# Patient Record
Sex: FEMALE
[De-identification: names, ages, dates, MRNs, and addresses within clinical notes are randomized per-mention and may not be internally consistent; named-entity substitution may affect disease eponyms.]

---

## 2018-04-23 ENCOUNTER — APPOINTMENT (OUTPATIENT)
Dept: ORTHOPEDIC SURGERY | Facility: CLINIC | Age: 63
End: 2018-04-23
Payer: COMMERCIAL

## 2018-04-23 VITALS
WEIGHT: 183 LBS | SYSTOLIC BLOOD PRESSURE: 112 MMHG | DIASTOLIC BLOOD PRESSURE: 73 MMHG | HEART RATE: 64 BPM | HEIGHT: 67 IN | BODY MASS INDEX: 28.72 KG/M2

## 2018-04-23 PROCEDURE — 73080 X-RAY EXAM OF ELBOW: CPT | Mod: LT

## 2018-04-23 PROCEDURE — 99204 OFFICE O/P NEW MOD 45 MIN: CPT

## 2018-04-23 PROCEDURE — 73030 X-RAY EXAM OF SHOULDER: CPT | Mod: LT

## 2018-05-14 ENCOUNTER — APPOINTMENT (OUTPATIENT)
Dept: ORTHOPEDIC SURGERY | Facility: CLINIC | Age: 63
End: 2018-05-14
Payer: COMMERCIAL

## 2018-05-14 VITALS
DIASTOLIC BLOOD PRESSURE: 81 MMHG | HEART RATE: 61 BPM | BODY MASS INDEX: 28.72 KG/M2 | SYSTOLIC BLOOD PRESSURE: 146 MMHG | WEIGHT: 183 LBS | HEIGHT: 67 IN

## 2018-05-14 PROCEDURE — 99213 OFFICE O/P EST LOW 20 MIN: CPT

## 2018-06-11 ENCOUNTER — APPOINTMENT (OUTPATIENT)
Dept: ORTHOPEDIC SURGERY | Facility: CLINIC | Age: 63
End: 2018-06-11
Payer: COMMERCIAL

## 2018-06-11 VITALS
HEIGHT: 67 IN | DIASTOLIC BLOOD PRESSURE: 76 MMHG | HEART RATE: 61 BPM | BODY MASS INDEX: 28.72 KG/M2 | WEIGHT: 183 LBS | SYSTOLIC BLOOD PRESSURE: 120 MMHG

## 2018-06-11 DIAGNOSIS — M77.12 LATERAL EPICONDYLITIS, LEFT ELBOW: ICD-10-CM

## 2018-06-11 DIAGNOSIS — M75.82 OTHER SHOULDER LESIONS, LEFT SHOULDER: ICD-10-CM

## 2018-06-11 PROCEDURE — 99213 OFFICE O/P EST LOW 20 MIN: CPT

## 2018-06-16 ENCOUNTER — RX RENEWAL (OUTPATIENT)
Age: 63
End: 2018-06-16

## 2018-07-09 ENCOUNTER — APPOINTMENT (OUTPATIENT)
Dept: ORTHOPEDIC SURGERY | Facility: CLINIC | Age: 63
End: 2018-07-09

## 2018-08-16 ENCOUNTER — RESULT REVIEW (OUTPATIENT)
Age: 63
End: 2018-08-16

## 2018-08-16 ENCOUNTER — OUTPATIENT (OUTPATIENT)
Dept: OUTPATIENT SERVICES | Facility: HOSPITAL | Age: 63
LOS: 1 days | Discharge: HOME | End: 2018-08-16

## 2018-08-16 DIAGNOSIS — Z90.5 ACQUIRED ABSENCE OF KIDNEY: Chronic | ICD-10-CM

## 2018-08-21 LAB — SURGICAL PATHOLOGY STUDY: SIGNIFICANT CHANGE UP

## 2018-09-24 ENCOUNTER — RESULT REVIEW (OUTPATIENT)
Age: 63
End: 2018-09-24

## 2019-03-04 ENCOUNTER — INPATIENT (INPATIENT)
Facility: HOSPITAL | Age: 64
LOS: 1 days | Discharge: HOME | DRG: 552 | End: 2019-03-06
Attending: HOSPITALIST | Admitting: HOSPITALIST
Payer: MEDICARE

## 2019-03-04 VITALS
HEIGHT: 67 IN | HEART RATE: 66 BPM | TEMPERATURE: 98 F | SYSTOLIC BLOOD PRESSURE: 139 MMHG | WEIGHT: 186.95 LBS | DIASTOLIC BLOOD PRESSURE: 87 MMHG | RESPIRATION RATE: 20 BRPM | OXYGEN SATURATION: 96 %

## 2019-03-04 DIAGNOSIS — Z90.5 ACQUIRED ABSENCE OF KIDNEY: Chronic | ICD-10-CM

## 2019-03-04 LAB
ALBUMIN SERPL ELPH-MCNC: 3.4 G/DL — LOW (ref 3.5–5)
ALP SERPL-CCNC: 59 U/L — SIGNIFICANT CHANGE UP (ref 40–120)
ALT FLD-CCNC: 20 U/L DA — SIGNIFICANT CHANGE UP (ref 10–60)
ANION GAP SERPL CALC-SCNC: 7 MMOL/L — SIGNIFICANT CHANGE UP (ref 5–17)
APPEARANCE UR: CLEAR — SIGNIFICANT CHANGE UP
APTT BLD: 28.5 SEC — SIGNIFICANT CHANGE UP (ref 27.5–36.3)
AST SERPL-CCNC: 20 U/L — SIGNIFICANT CHANGE UP (ref 10–40)
BACTERIA # UR AUTO: ABNORMAL /HPF
BASOPHILS # BLD AUTO: 0.04 K/UL — SIGNIFICANT CHANGE UP (ref 0–0.2)
BASOPHILS NFR BLD AUTO: 0.5 % — SIGNIFICANT CHANGE UP (ref 0–2)
BILIRUB SERPL-MCNC: 0.4 MG/DL — SIGNIFICANT CHANGE UP (ref 0.2–1.2)
BILIRUB UR-MCNC: NEGATIVE — SIGNIFICANT CHANGE UP
BUN SERPL-MCNC: 31 MG/DL — HIGH (ref 7–18)
CALCIUM SERPL-MCNC: 8 MG/DL — LOW (ref 8.4–10.5)
CHLORIDE SERPL-SCNC: 111 MMOL/L — HIGH (ref 96–108)
CO2 SERPL-SCNC: 24 MMOL/L — SIGNIFICANT CHANGE UP (ref 22–31)
COLOR SPEC: YELLOW — SIGNIFICANT CHANGE UP
CREAT SERPL-MCNC: 1.1 MG/DL — SIGNIFICANT CHANGE UP (ref 0.5–1.3)
DIFF PNL FLD: NEGATIVE — SIGNIFICANT CHANGE UP
EOSINOPHIL # BLD AUTO: 0.07 K/UL — SIGNIFICANT CHANGE UP (ref 0–0.5)
EOSINOPHIL NFR BLD AUTO: 0.8 % — SIGNIFICANT CHANGE UP (ref 0–6)
EPI CELLS # UR: SIGNIFICANT CHANGE UP /HPF
GLUCOSE SERPL-MCNC: 103 MG/DL — HIGH (ref 70–99)
GLUCOSE UR QL: NEGATIVE — SIGNIFICANT CHANGE UP
HCT VFR BLD CALC: 36.3 % — SIGNIFICANT CHANGE UP (ref 34.5–45)
HGB BLD-MCNC: 11.4 G/DL — LOW (ref 11.5–15.5)
IMM GRANULOCYTES NFR BLD AUTO: 0.1 % — SIGNIFICANT CHANGE UP (ref 0–1.5)
INR BLD: 0.99 RATIO — SIGNIFICANT CHANGE UP (ref 0.88–1.16)
KETONES UR-MCNC: NEGATIVE — SIGNIFICANT CHANGE UP
LEUKOCYTE ESTERASE UR-ACNC: ABNORMAL
LYMPHOCYTES # BLD AUTO: 2.96 K/UL — SIGNIFICANT CHANGE UP (ref 1–3.3)
LYMPHOCYTES # BLD AUTO: 34.7 % — SIGNIFICANT CHANGE UP (ref 13–44)
MAGNESIUM SERPL-MCNC: 2.2 MG/DL — SIGNIFICANT CHANGE UP (ref 1.6–2.6)
MCHC RBC-ENTMCNC: 27.5 PG — SIGNIFICANT CHANGE UP (ref 27–34)
MCHC RBC-ENTMCNC: 31.4 GM/DL — LOW (ref 32–36)
MCV RBC AUTO: 87.5 FL — SIGNIFICANT CHANGE UP (ref 80–100)
MONOCYTES # BLD AUTO: 0.4 K/UL — SIGNIFICANT CHANGE UP (ref 0–0.9)
MONOCYTES NFR BLD AUTO: 4.7 % — SIGNIFICANT CHANGE UP (ref 2–14)
NEUTROPHILS # BLD AUTO: 5.06 K/UL — SIGNIFICANT CHANGE UP (ref 1.8–7.4)
NEUTROPHILS NFR BLD AUTO: 59.2 % — SIGNIFICANT CHANGE UP (ref 43–77)
NITRITE UR-MCNC: NEGATIVE — SIGNIFICANT CHANGE UP
NRBC # BLD: 0 /100 WBCS — SIGNIFICANT CHANGE UP (ref 0–0)
PH UR: 5 — SIGNIFICANT CHANGE UP (ref 5–8)
PHOSPHATE SERPL-MCNC: 3.6 MG/DL — SIGNIFICANT CHANGE UP (ref 2.5–4.5)
PLATELET # BLD AUTO: 147 K/UL — LOW (ref 150–400)
POTASSIUM SERPL-MCNC: 4.2 MMOL/L — SIGNIFICANT CHANGE UP (ref 3.5–5.3)
POTASSIUM SERPL-SCNC: 4.2 MMOL/L — SIGNIFICANT CHANGE UP (ref 3.5–5.3)
PROT SERPL-MCNC: 7.4 G/DL — SIGNIFICANT CHANGE UP (ref 6–8.3)
PROT UR-MCNC: NEGATIVE — SIGNIFICANT CHANGE UP
PROTHROM AB SERPL-ACNC: 11 SEC — SIGNIFICANT CHANGE UP (ref 10–12.9)
RBC # BLD: 4.15 M/UL — SIGNIFICANT CHANGE UP (ref 3.8–5.2)
RBC # FLD: 14 % — SIGNIFICANT CHANGE UP (ref 10.3–14.5)
RBC CASTS # UR COMP ASSIST: SIGNIFICANT CHANGE UP /HPF (ref 0–2)
SODIUM SERPL-SCNC: 142 MMOL/L — SIGNIFICANT CHANGE UP (ref 135–145)
SP GR SPEC: 1.02 — SIGNIFICANT CHANGE UP (ref 1.01–1.02)
UROBILINOGEN FLD QL: NEGATIVE — SIGNIFICANT CHANGE UP
WBC # BLD: 8.54 K/UL — SIGNIFICANT CHANGE UP (ref 3.8–10.5)
WBC # FLD AUTO: 8.54 K/UL — SIGNIFICANT CHANGE UP (ref 3.8–10.5)
WBC UR QL: SIGNIFICANT CHANGE UP /HPF (ref 0–5)

## 2019-03-04 PROCEDURE — 71045 X-RAY EXAM CHEST 1 VIEW: CPT | Mod: 26

## 2019-03-04 PROCEDURE — 99285 EMERGENCY DEPT VISIT HI MDM: CPT

## 2019-03-04 PROCEDURE — 99223 1ST HOSP IP/OBS HIGH 75: CPT

## 2019-03-04 PROCEDURE — 70450 CT HEAD/BRAIN W/O DYE: CPT | Mod: 26

## 2019-03-04 RX ORDER — SODIUM CHLORIDE 9 MG/ML
1000 INJECTION INTRAMUSCULAR; INTRAVENOUS; SUBCUTANEOUS ONCE
Qty: 0 | Refills: 0 | Status: COMPLETED | OUTPATIENT
Start: 2019-03-04 | End: 2019-03-04

## 2019-03-04 RX ADMIN — SODIUM CHLORIDE 1000 MILLILITER(S): 9 INJECTION INTRAMUSCULAR; INTRAVENOUS; SUBCUTANEOUS at 21:05

## 2019-03-04 NOTE — H&P ADULT - ASSESSMENT
63 Female from home with PMH of Non-obstructive CAD, PVCs, S/p Nephrectomy came to hospital for unsteady gait for 1-2 months. Admitted to Tele for suspicion of lumber radiculopathy.

## 2019-03-04 NOTE — H&P ADULT - HISTORY OF PRESENT ILLNESS
63 Female 63 Female with PMH of Non-obstructive CAD, PVCs, S/p Nephrectomy came to hospital for unsteady gait. 63 Female from home with PMH of Non-obstructive CAD, PVCs, S/p Nephrectomy came to hospital for unsteady gait for 1-2 months. She has been feeling decreased strength in lower extremity with some pain in her Lt leg. Pain is dull and exacerbates with movement of leg. Pt states, she can't stand still for more than a minute due to her unsteadiness. Pain also radiates down to her left thigh. She has been moving some furniture at home but denies trauma or fall. Pt has noted some frontal headache and denies dizziness, focal weakness, chest pain, shortness of breath, problems with urine/bowel incontinence.     ED Course: Hemodynamically stable. Labs were unremarkable. CT head was normal and EKG showed Short [place] interval. CXR was clear and she was given 1 liter bolus. 63 Female from home with PMH of Non-obstructive CAD, PVCs, S/p Nephrectomy (40 yrs ago) came to hospital for unsteady gait for 1-2 months. She has been feeling decreased strength in lower extremity with some pain in her Lt leg. Pain is dull and exacerbates with movement of leg. Pt states, she can't stand still for more than a minute due to her unsteadiness. Pain also radiates down to her left thigh. She has been moving some furniture at home but denies trauma or fall. Pt has noted some frontal headache and denies dizziness, focal weakness, chest pain, shortness of breath, problems with urine/bowel incontinence.     ED Course: Hemodynamically stable. Labs were unremarkable. CT head was normal and EKG showed Short [place] interval. CXR was clear and she was given 1 liter bolus.

## 2019-03-04 NOTE — ED PROVIDER NOTE - CLINICAL SUMMARY MEDICAL DECISION MAKING FREE TEXT BOX
62 y/o F pt with possible central mass vs CNS process. Low suspicion for intracranial. Very likely spinal. Will get CT head, neuro consult and admit for MRI for further workup.

## 2019-03-04 NOTE — H&P ADULT - ATTENDING COMMENTS
Patient seen and examined ; case was discussed with the admitting resident  ROS: as in the HPI; all other ROS negative  SH and family history as above    Vital Signs Last 24 Hrs  T(C): 36.3 (05 Mar 2019 04:57), Max: 36.7 (04 Mar 2019 20:50)  T(F): 97.3 (05 Mar 2019 04:57), Max: 98 (04 Mar 2019 20:50)  HR: 56 (05 Mar 2019 04:57) (53 - 66)  BP: 126/63 (05 Mar 2019 04:57) (126/63 - 142/65)  BP(mean): --  RR: 16 (05 Mar 2019 04:57) (16 - 20)  SpO2: 99% (05 Mar 2019 04:57) (95% - 99%)  GEN: NAD  HEENT- normocephalic; mouth moist  CVS- S1S2+, bradycardia ~60bpm   LUNGS- clear to auscultation; no wheezing  ABD: Soft , nontender, nondistended, Bowel sounds are present  EXTREMITY: no calf tenderness, no cyanosis, no edema. +Gopi test for L hip pain   NEURO: AAOx3; non focal neurologic exam; cranial nerves grossly intact, no nystagmus, DTRs 2 in UE BL brachioradialis, triceps, 2 in L patellar and not able to get reflex on R patellar   PSYCH: normal affect and behavior  BACK: no swelling or mass; +pinpoint tenderness over lumbosacral junction.   VASCULAR: ++ distal peripheral pulses  SKIN: warm and dry.       Labs Reviewed:                         11.4   8.54  )-----------( 147      ( 04 Mar 2019 21:00 )             36.3     03-04    142  |  111<H>  |  31<H>  ----------------------------<  103<H>  4.2   |  24  |  1.10    Ca    8.0<L>      04 Mar 2019 21:00  Phos  3.6     03-04  Mg     2.2     03-04    TPro  7.4  /  Alb  3.4<L>  /  TBili  0.4  /  DBili  x   /  AST  20  /  ALT  20  /  AlkPhos  59  03-04    CT Head reviewed-   < from: CT Head No Cont (03.04.19 @ 20:23)   VENTRICLES AND SULCI:  Prominence of the ventricles and sulci, probably   on the basis of diffuse cerebral volume loss.  INTRA-AXIAL:  No acute intracranial hemorrhage, mass effect, or evidence   of acute territorial infarct. Small-vessel white matter ischemic changes   are present.  EXTRA-AXIAL:  No mass or collection is seen.  VISUALIZED SINUSES:  No air-fluid levels.  VISUALIZED MASTOIDS:  Clear.  CALVARIUM:  Normal.  MISCELLANEOUS:  None.    IMPRESSION:    No acute intracranial hemorrhage, mass effect, or evidence of acute   territorial infarct.   If clinical symptoms persist, recommend follow-up imaging with MRI brain   if there are no contraindications.    EKG Reviewed     64 y/o F with h/o arrhythmia- ?PVCs vs SVT (unclear but she appears to have had some sort of ablation procedure which she reports was not successful) admitted with gait dysfunction and BL LE weakness.     1.	LE weakness- in ability to ambulate. Give way weakness on exam, denies pain but has pain when doing ROM and MST testing and has MSK componenent with e/o trochanteric bursitis and hip OA on exam, but also has point tenderness over LS junction and clinical features of DJD. Low clinical suspicion for AIDP as weakness is give-way and MSK contributions appear to be contributing more, reflexes present although not able to get L4 on the RLE. Denies sx of cauda equina/cord compression, fever, nightsweats. Will check vit D/B12/Mg/Phos/TSH/ESR/CRP.  Would check MRI L spine, to be discussed further with neurology in AM. Patient reports having to hold on to wall to keep from falling but denies vertiginous symptoms, does have sinus bradycardia. PT consult.   2.	Trochanteric bursitis and likely L hip OA- check xray   3.	Headache- appears tension. CT negative for acute findings, no focal deficits. Tylenol.   4.	Pain control- patient denies being in significant pain but appears uncomfortable on exam and when moving around. Added APAP, lidocaine, and Flexeril. Patient hesitant to take medication.  5.	Abnormal EKG- short SD with LVH and repolarization changes and bradycardia. Reports h/o baseline tachycardia, had LHC in 2016 which was negative for CAD but was performed for abnormal stress test at that time which had SVT and ischemic changes on stress echo. Monitor on tele for now and consider DC if echo ok.   6.	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 Monitor, avoid nephrotoxic medications.       Plan of care discussed with patient ;  all questions and concerns were addressed.

## 2019-03-04 NOTE — ED PROVIDER NOTE - PHYSICAL EXAMINATION
MSK: 4/5 L leg with subjective numbness to outer aspect L leg  positive Romberg   ataxia upon walking with hesitancy to the L side

## 2019-03-04 NOTE — H&P ADULT - NSHPPHYSICALEXAM_GEN_ALL_CORE
Vital Signs Last 24 Hrs  T(C): 36.7 (05 Mar 2019 00:24), Max: 36.7 (04 Mar 2019 20:50)  T(F): 98 (05 Mar 2019 00:24), Max: 98 (04 Mar 2019 20:50)  HR: 53 (05 Mar 2019 00:24) (53 - 66)  BP: 142/65 (05 Mar 2019 00:24) (139/76 - 142/65)  RR: 18 (05 Mar 2019 00:24) (18 - 20)  SpO2: 95% (05 Mar 2019 00:24) (95% - 96%)  .  GENERAL: Well developed, Middle aged female, NAD  HEENT:  Normocephalic/Atraumatic, reactive light reflex, moist mucous membranes  NECK: Supple, no JVD  RESP: Symmetric movement of the chest, clear to auscultation bilaterally  CVS: S1 and S2 audible, no murmur, rubs or gallops noted  GI: Normal active bowel sounds present, abdomen soft, non tender, non distended  EXTREMITIES:  No edema, no clubbing, cyanosis  MSK: 4/5 Strength in bilateral lower extremity more weakness in Lt leg, Lt hip point tenderness, Hypoactive Lt leg DTRs   PSYCH: Normal mood, normal affect observed    NEURO: Alert and oriented x 3

## 2019-03-04 NOTE — ED PROVIDER NOTE - OBJECTIVE STATEMENT
64 y/o F pt with a PMHx of HTN and a PSHx of nephrectomy presents to the ED c/o b/l leg weakness and unsteady gait x couple months worsening yesterday with associated headaches and back pain over the past week. Pt reports feeling like she has to hold on to something when she walks or stands up and cannot  one place for more than a minute. Pt notes that her cowokers told her she was shaking at work today. Pt denies current back pain, dizziness, numbness, abd pain, diarrhea, runny nose, cough or any other complaints; denies any falls. Pt is not on any medications. PMD; Dr. Rafaela Evans. [email or web address].

## 2019-03-04 NOTE — H&P ADULT - PROBLEM SELECTOR PLAN 1
4/5 Strength in bilateral lower extremity with slightly more weakness in Lt leg, Lt hip point tenderness, Hypoactive Lt leg DTRs  Negative straight leg test  No reported loss of urine or bowel  Ordered Lumbar MRI to r/o radiculopathy  Flexeril and Lidocaine patch for pain control 4/5 Strength in bilateral lower extremity with slightly more weakness in Lt leg, Lt hip point tenderness, Hypoactive Lt leg DTRs  Negative straight leg test  No reported loss of urine or bowel  Ordered Lumbar MRI to r/o radiculopathy  Flexeril and Lidocaine patch for pain control  Consulted Dr Aguilar

## 2019-03-05 DIAGNOSIS — R00.1 BRADYCARDIA, UNSPECIFIED: ICD-10-CM

## 2019-03-05 DIAGNOSIS — R27.0 ATAXIA, UNSPECIFIED: ICD-10-CM

## 2019-03-05 DIAGNOSIS — Z29.9 ENCOUNTER FOR PROPHYLACTIC MEASURES, UNSPECIFIED: ICD-10-CM

## 2019-03-05 DIAGNOSIS — R26.9 UNSPECIFIED ABNORMALITIES OF GAIT AND MOBILITY: ICD-10-CM

## 2019-03-05 LAB
ALBUMIN SERPL ELPH-MCNC: 2.9 G/DL — LOW (ref 3.5–5)
ALP SERPL-CCNC: 50 U/L — SIGNIFICANT CHANGE UP (ref 40–120)
ALT FLD-CCNC: 18 U/L DA — SIGNIFICANT CHANGE UP (ref 10–60)
ANION GAP SERPL CALC-SCNC: 6 MMOL/L — SIGNIFICANT CHANGE UP (ref 5–17)
AST SERPL-CCNC: 16 U/L — SIGNIFICANT CHANGE UP (ref 10–40)
BASOPHILS # BLD AUTO: 0.03 K/UL — SIGNIFICANT CHANGE UP (ref 0–0.2)
BASOPHILS NFR BLD AUTO: 0.5 % — SIGNIFICANT CHANGE UP (ref 0–2)
BILIRUB SERPL-MCNC: 0.7 MG/DL — SIGNIFICANT CHANGE UP (ref 0.2–1.2)
BUN SERPL-MCNC: 26 MG/DL — HIGH (ref 7–18)
CALCIUM SERPL-MCNC: 7.4 MG/DL — LOW (ref 8.4–10.5)
CHLORIDE SERPL-SCNC: 114 MMOL/L — HIGH (ref 96–108)
CHOLEST SERPL-MCNC: 142 MG/DL — SIGNIFICANT CHANGE UP (ref 10–199)
CK MB BLD-MCNC: <1.5 % — SIGNIFICANT CHANGE UP (ref 0–3.5)
CK MB CFR SERPL CALC: <1 NG/ML — SIGNIFICANT CHANGE UP (ref 0–3.6)
CK SERPL-CCNC: 68 U/L — SIGNIFICANT CHANGE UP (ref 21–215)
CO2 SERPL-SCNC: 24 MMOL/L — SIGNIFICANT CHANGE UP (ref 22–31)
CREAT SERPL-MCNC: 0.99 MG/DL — SIGNIFICANT CHANGE UP (ref 0.5–1.3)
CRP SERPL-MCNC: 0.45 MG/DL — HIGH (ref 0–0.4)
EOSINOPHIL # BLD AUTO: 0.08 K/UL — SIGNIFICANT CHANGE UP (ref 0–0.5)
EOSINOPHIL NFR BLD AUTO: 1.2 % — SIGNIFICANT CHANGE UP (ref 0–6)
ERYTHROCYTE [SEDIMENTATION RATE] IN BLOOD: 12 MM/HR — SIGNIFICANT CHANGE UP (ref 0–20)
FOLATE SERPL-MCNC: 11.3 NG/ML — SIGNIFICANT CHANGE UP
GLUCOSE SERPL-MCNC: 87 MG/DL — SIGNIFICANT CHANGE UP (ref 70–99)
HBA1C BLD-MCNC: 6.3 % — HIGH (ref 4–5.6)
HCT VFR BLD CALC: 35.3 % — SIGNIFICANT CHANGE UP (ref 34.5–45)
HDLC SERPL-MCNC: 58 MG/DL — SIGNIFICANT CHANGE UP
HGB BLD-MCNC: 11.1 G/DL — LOW (ref 11.5–15.5)
IMM GRANULOCYTES NFR BLD AUTO: 0.3 % — SIGNIFICANT CHANGE UP (ref 0–1.5)
LIPID PNL WITH DIRECT LDL SERPL: 75 MG/DL — SIGNIFICANT CHANGE UP
LYMPHOCYTES # BLD AUTO: 2.74 K/UL — SIGNIFICANT CHANGE UP (ref 1–3.3)
LYMPHOCYTES # BLD AUTO: 42.5 % — SIGNIFICANT CHANGE UP (ref 13–44)
MAGNESIUM SERPL-MCNC: 2 MG/DL — SIGNIFICANT CHANGE UP (ref 1.6–2.6)
MCHC RBC-ENTMCNC: 27.4 PG — SIGNIFICANT CHANGE UP (ref 27–34)
MCHC RBC-ENTMCNC: 31.4 GM/DL — LOW (ref 32–36)
MCV RBC AUTO: 87.2 FL — SIGNIFICANT CHANGE UP (ref 80–100)
MONOCYTES # BLD AUTO: 0.31 K/UL — SIGNIFICANT CHANGE UP (ref 0–0.9)
MONOCYTES NFR BLD AUTO: 4.8 % — SIGNIFICANT CHANGE UP (ref 2–14)
NEUTROPHILS # BLD AUTO: 3.27 K/UL — SIGNIFICANT CHANGE UP (ref 1.8–7.4)
NEUTROPHILS NFR BLD AUTO: 50.7 % — SIGNIFICANT CHANGE UP (ref 43–77)
NRBC # BLD: 0 /100 WBCS — SIGNIFICANT CHANGE UP (ref 0–0)
PHOSPHATE SERPL-MCNC: 3.6 MG/DL — SIGNIFICANT CHANGE UP (ref 2.5–4.5)
PLATELET # BLD AUTO: 146 K/UL — LOW (ref 150–400)
POTASSIUM SERPL-MCNC: 4.1 MMOL/L — SIGNIFICANT CHANGE UP (ref 3.5–5.3)
POTASSIUM SERPL-SCNC: 4.1 MMOL/L — SIGNIFICANT CHANGE UP (ref 3.5–5.3)
PROT SERPL-MCNC: 6.5 G/DL — SIGNIFICANT CHANGE UP (ref 6–8.3)
RBC # BLD: 4.05 M/UL — SIGNIFICANT CHANGE UP (ref 3.8–5.2)
RBC # FLD: 14.1 % — SIGNIFICANT CHANGE UP (ref 10.3–14.5)
SODIUM SERPL-SCNC: 144 MMOL/L — SIGNIFICANT CHANGE UP (ref 135–145)
T4 AB SER-ACNC: 5.8 UG/DL — SIGNIFICANT CHANGE UP (ref 4.6–12)
TOTAL CHOLESTEROL/HDL RATIO MEASUREMENT: 2.4 RATIO — LOW (ref 3.3–7.1)
TRIGL SERPL-MCNC: 43 MG/DL — SIGNIFICANT CHANGE UP (ref 10–149)
TROPONIN I SERPL-MCNC: <0.015 NG/ML — SIGNIFICANT CHANGE UP (ref 0–0.04)
TSH SERPL-MCNC: 2.38 UU/ML — SIGNIFICANT CHANGE UP (ref 0.34–4.82)
VIT B12 SERPL-MCNC: 792 PG/ML — SIGNIFICANT CHANGE UP (ref 232–1245)
WBC # BLD: 6.45 K/UL — SIGNIFICANT CHANGE UP (ref 3.8–10.5)
WBC # FLD AUTO: 6.45 K/UL — SIGNIFICANT CHANGE UP (ref 3.8–10.5)

## 2019-03-05 PROCEDURE — 99232 SBSQ HOSP IP/OBS MODERATE 35: CPT | Mod: GC

## 2019-03-05 PROCEDURE — 73502 X-RAY EXAM HIP UNI 2-3 VIEWS: CPT | Mod: 26,LT

## 2019-03-05 PROCEDURE — 99223 1ST HOSP IP/OBS HIGH 75: CPT

## 2019-03-05 RX ORDER — GABAPENTIN 400 MG/1
100 CAPSULE ORAL
Qty: 0 | Refills: 0 | Status: DISCONTINUED | OUTPATIENT
Start: 2019-03-05 | End: 2019-03-06

## 2019-03-05 RX ORDER — LIDOCAINE 4 G/100G
1 CREAM TOPICAL DAILY
Qty: 0 | Refills: 0 | Status: DISCONTINUED | OUTPATIENT
Start: 2019-03-05 | End: 2019-03-06

## 2019-03-05 RX ORDER — ENOXAPARIN SODIUM 100 MG/ML
40 INJECTION SUBCUTANEOUS DAILY
Qty: 0 | Refills: 0 | Status: DISCONTINUED | OUTPATIENT
Start: 2019-03-05 | End: 2019-03-06

## 2019-03-05 RX ORDER — ACETAMINOPHEN 500 MG
650 TABLET ORAL EVERY 6 HOURS
Qty: 0 | Refills: 0 | Status: DISCONTINUED | OUTPATIENT
Start: 2019-03-05 | End: 2019-03-06

## 2019-03-05 RX ORDER — CYCLOBENZAPRINE HYDROCHLORIDE 10 MG/1
5 TABLET, FILM COATED ORAL THREE TIMES A DAY
Qty: 0 | Refills: 0 | Status: DISCONTINUED | OUTPATIENT
Start: 2019-03-05 | End: 2019-03-06

## 2019-03-05 RX ADMIN — ENOXAPARIN SODIUM 40 MILLIGRAM(S): 100 INJECTION SUBCUTANEOUS at 12:29

## 2019-03-05 RX ADMIN — LIDOCAINE 1 PATCH: 4 CREAM TOPICAL at 19:39

## 2019-03-05 RX ADMIN — LIDOCAINE 1 PATCH: 4 CREAM TOPICAL at 12:29

## 2019-03-05 RX ADMIN — SODIUM CHLORIDE 1000 MILLILITER(S): 9 INJECTION INTRAMUSCULAR; INTRAVENOUS; SUBCUTANEOUS at 01:17

## 2019-03-05 RX ADMIN — GABAPENTIN 100 MILLIGRAM(S): 400 CAPSULE ORAL at 17:40

## 2019-03-05 NOTE — CONSULT NOTE ADULT - SUBJECTIVE AND OBJECTIVE BOX
Patient is a 63y old  Female who presents with a chief complaint of Legs weakness and pain (04 Mar 2019 23:54)      HPI:  63 Female from home with PMH of Non-obstructive CAD, PVCs, S/p Nephrectomy (40 yrs ago) came to hospital for unsteady gait for 1-2 months. She has been feeling decreased strength in lower extremity with some pain in her Lt leg. Pain is dull and exacerbates with movement of leg. Pt states, she can't stand still for more than a minute due to her unsteadiness. Pain also radiates down to her left thigh. She has been moving some furniture at home but denies trauma or fall. Pt has noted some frontal headache and denies dizziness, focal weakness, chest pain, shortness of breath, problems with urine/bowel incontinence.     ED Course: Hemodynamically stable. Labs were unremarkable. CT head was normal and EKG showed Short [place] interval. CXR was clear and she was given 1 liter bolus. (04 Mar 2019 23:54)           The patient was last know well at  The patient lives at home/ NH.  The patient walks without assistance/ with a cane or walker    Neurological Review of Systems:  No difficulty with language.  No vision loss or double vision.  No dizziness, vertigo or new hearing loss.  No difficulty with speech or swallowing.  No focal weakness.  No focal sensory changes.  No numbness or tingling in the bilateral lower extremities.  No difficulty with balance.  No difficulty with ambulation.      MEDICATIONS  (STANDING):  enoxaparin Injectable 40 milliGRAM(s) SubCutaneous daily  lidocaine   Patch 1 Patch Transdermal daily    MEDICATIONS  (PRN):  acetaminophen   Tablet .. 650 milliGRAM(s) Oral every 6 hours PRN Mild Pain (1 - 3)  cyclobenzaprine 5 milliGRAM(s) Oral three times a day PRN Muscle Spasm    Allergies    No Known Allergies    Intolerances      PAST MEDICAL & SURGICAL HISTORY:  HTN (hypertension)  Single kidney  S/p nephrectomy: donated kidney    FAMILY HISTORY:  Family history of prostate cancer (Father)    SOCIAL HISTORY: non smoker/ former smoker/ active smoker    Review of Systems:  Constitutional: No generalized weakness. No fevers or chills.                    Eyes, Ears, Mouth, Throat: No vision loss   Respiratory: No shortness of breath or cough.                                Cardiovascular: No chest pain or palpitations  Gastrointestinal: No nausea or vomiting.                                         Genitourinary: No urinary incontinence or burning on urination.  Musculoskeletal: No joint pain.                                                           Dermatologic: No rash.  Neurological: as per HPI                                                                      Psychiatric: No behavioral problems.  Endocrine: No known hypoglycemia.               Hematologic/Lymphatic: No easy bleeding.    O:  Vital Signs Last 24 Hrs  T(C): 36.7 (05 Mar 2019 09:42), Max: 36.7 (04 Mar 2019 20:50)  T(F): 98 (05 Mar 2019 09:42), Max: 98 (04 Mar 2019 20:50)  HR: 56 (05 Mar 2019 09:42) (53 - 66)  BP: 130/77 (05 Mar 2019 09:42) (126/63 - 142/65)  BP(mean): --  RR: 18 (05 Mar 2019 09:42) (16 - 20)  SpO2: 96% (05 Mar 2019 09:42) (95% - 99%)    General Exam:   General appearance: No acute distress                 Cardiovascular: Pedal dorsalis pulses intact bilaterally    Neurological Exam:  NIH Stroke Scale (NIHSS):   1a. LOConscious:  0-alert 1-lethargy 2-obtund 3-coma:    _____  1b. LOC Questions:  0-both 1-one 2-none                       _____  1c. LOC Commands:  0-both 1-one 2-none                     _____  2.   Gaze:  0-nl 1-partial 2-conjugate                                _____  3.   Visual:  0-nl 1-part jaz 2-full jaz 3-bilat jaz         _____  4.   Facial Palsy:  0-nl 1-minor 2-part 3-complete             _____  5.   Motor Arm:  0-nl 1-drift 2-effort 3-no effort         Left             _____                              4-no move UN-amputated                     Right  _____  6.   Motor Leg:                                                                 Left   _____                                                                                   Right _____  7.   Ataxia:  0-nl 1-one limb 2-two UN-amp                      _____  8.   Sensory:  0-nl 1-mild 2-severe                                  _____  9.   Language:  0-nl 1-mild 2-severe 3-mute                     _____  10.  Dysarthria:  0-nl 1-mild 2-severe 3-barrier                  _____  11.  Extinction/Inattention:  0-nl 1-mild 2-deep                 _____         TOTAL NIHSS       ________    Mental Status: Orientated to self, date and place.  Attention intact.  No dysarthria, aphasia or neglect.  Knowledge intact.  Registration intact.  Short and long term memory grossly intact.      Cranial Nerves: CN I - not tested.  PERRL, EOMI, VFF, no nystagmus or diplopia.  No APD.  Fundi not visualized bilaterally.  CN V1-3 intact to light touch and pinprick.  No facial asymmetry.  Hearing intact to finger rub bilaterally.  Tongue, uvula and palate midline.  Sternocleidomastoid and Trapezius intact bilaterally.    Motor:   Tone: normal.                  Strength intact throughout  No pronator drift bilaterally                      No dysmetria on finger-nose-finger or heel-shin-heel  No truncal ataxia.  No resting, postural or action tremor.  No myoclonus.    Sensation: intact to light touch, pinprick, vibration and proprioception    Deep Tendon Reflexes: 1+ bilateral biceps, triceps, brachioradialis, knee and ankle  Toes flexor bilaterally    Gait: normal and stable.  Rhomberg -salena.    Other:      LABS:                        11.1   6.45  )-----------( 146      ( 05 Mar 2019 06:40 )             35.3     03-05    144  |  114<H>  |  26<H>  ----------------------------<  87  4.1   |  24  |  0.99    Ca    7.4<L>      05 Mar 2019 06:40  Phos  3.6     03-05  Mg     2.0     03-05    TPro  6.5  /  Alb  2.9<L>  /  TBili  0.7  /  DBili  x   /  AST  16  /  ALT  18  /  AlkPhos  50  03-05    PT/INR - ( 04 Mar 2019 21:00 )   PT: 11.0 sec;   INR: 0.99 ratio         PTT - ( 04 Mar 2019 21:00 )  PTT:28.5 sec  Urinalysis Basic - ( 04 Mar 2019 19:54 )    Color: Yellow / Appearance: Clear / S.020 / pH: x  Gluc: x / Ketone: Negative  / Bili: Negative / Urobili: Negative   Blood: x / Protein: Negative / Nitrite: Negative   Leuk Esterase: Moderate / RBC: 0-2 /HPF / WBC 3-5 /HPF   Sq Epi: x / Non Sq Epi: Few /HPF / Bacteria: Few /HPF      LDL  HbA1C    RADIOLOGY & ADDITIONAL STUDIES:    EKG:  < from: CT Head No Cont (19 @ 20:23) > (images reviwed)  IMPRESSION:    No acute intracranial hemorrhage, mass effect, or evidence of acute   territorial infarct.   If clinical symptoms persist, recommend follow-up imaging with MRI brain   if thereare no contraindications.    < end of copied text > Patient is a 63y old  Female who presents with a chief complaint of Legs weakness and pain (04 Mar 2019 23:54)      HPI:  63 Female from home with PMH of Non-obstructive CAD, PVCs, S/p Nephrectomy (40 yrs ago) came to hospital for problems with ambulation for for 1-2 months. The patient feels problems with balance for this time as well, present to both sides.  She feels weakness in both legs and complaigns of new pain radiating to the left leg and hip/ pelvis.  There is no numbness or tingling in  the legs.  The patient says that she was moving furniture before the back pain started.  NO falls, no trauma to the spine or head.      Neurological Review of Systems:  No difficulty with language.  No vision loss or double vision.  No dizziness, vertigo or new hearing loss.  No difficulty with speech or swallowing.    No numbness or tingling in the bilateral lower extremities. + difficulty with balance. and ambulation.      MEDICATIONS  (STANDING):  enoxaparin Injectable 40 milliGRAM(s) SubCutaneous daily  lidocaine   Patch 1 Patch Transdermal daily    MEDICATIONS  (PRN):  acetaminophen   Tablet .. 650 milliGRAM(s) Oral every 6 hours PRN Mild Pain (1 - 3)  cyclobenzaprine 5 milliGRAM(s) Oral three times a day PRN Muscle Spasm    Allergies    No Known Allergies    Intolerances      PAST MEDICAL & SURGICAL HISTORY:  HTN (hypertension)  Single kidney  S/p nephrectomy: donated kidney    FAMILY HISTORY:  Family history of prostate cancer (Father)    SOCIAL HISTORY: non smoker    Review of Systems:  Constitutional: No fevers.                    Eyes, Ears, Mouth, Throat: No vision loss   Respiratory: No cough.                                Cardiovascular: No chest pain.  Gastrointestinal: No vomiting.                                         Genitourinary: No urinary incontinence or burning on urination.  Musculoskeletal: + joint pain.                                                           Dermatologic: No rash.  Neurological: as per HPI                                                                      Psychiatric: No behavioral problems.  Endocrine: No known hypoglycemia.               Hematologic/Lymphatic: No easy bleeding.    O:  Vital Signs Last 24 Hrs  T(C): 36.7 (05 Mar 2019 09:42), Max: 36.7 (04 Mar 2019 20:50)  T(F): 98 (05 Mar 2019 09:42), Max: 98 (04 Mar 2019 20:50)  HR: 56 (05 Mar 2019 09:42) (53 - 66)  BP: 130/77 (05 Mar 2019 09:42) (126/63 - 142/65)  BP(mean): --  RR: 18 (05 Mar 2019 09:42) (16 - 20)  SpO2: 96% (05 Mar 2019 09:42) (95% - 99%)    General Exam:   General appearance: No acute distress                 Cardiovascular: Pedal dorsalis pulses intact bilaterally    Neurological Exam:  NIH Stroke Scale (NIHSS): 0    Mental Status: Orientated to self, date and place.  Attention intact.  No dysarthria, aphasia or neglect.  Knowledge intact.  Registration intact.  Short and long term memory grossly intact.      Cranial Nerves: CN I - not tested.  PERRL, EOMI, VFF, no nystagmus or diplopia.  No APD.  Fundi not visualized bilaterally.  CN V1-3 intact to light touch and pinprick.  No facial asymmetry.  Hearing intact to finger rub bilaterally.  Tongue, uvula and palate midline.  Sternocleidomastoid and Trapezius intact bilaterally.    Motor:   Tone: normal.                  Strength intact throughout  No pronator drift bilaterally                      No dysmetria on finger-nose-finger or heel-shin-heel    Sensation: intact to light touch, pinprick    Deep Tendon Reflexes: 1+ bilateral biceps, triceps, brachioradialis, knee  Toes flexor bilaterally    Gait: wide based and unstable.  Rhomberg +salena.    Other:      LABS:                        11.1   6.45  )-----------( 146      ( 05 Mar 2019 06:40 )             35.3     03-05    144  |  114<H>  |  26<H>  ----------------------------<  87  4.1   |  24  |  0.99    Ca    7.4<L>      05 Mar 2019 06:40  Phos  3.6     03-05  Mg     2.0     03-05    TPro  6.5  /  Alb  2.9<L>  /  TBili  0.7  /  DBili  x   /  AST  16  /  ALT  18  /  AlkPhos  50  03-05    PT/INR - ( 04 Mar 2019 21:00 )   PT: 11.0 sec;   INR: 0.99 ratio         PTT - ( 04 Mar 2019 21:00 )  PTT:28.5 sec  Urinalysis Basic - ( 04 Mar 2019 19:54 )    Color: Yellow / Appearance: Clear / S.020 / pH: x  Gluc: x / Ketone: Negative  / Bili: Negative / Urobili: Negative   Blood: x / Protein: Negative / Nitrite: Negative   Leuk Esterase: Moderate / RBC: 0-2 /HPF / WBC 3-5 /HPF   Sq Epi: x / Non Sq Epi: Few /HPF / Bacteria: Few /HPF      LDL  HbA1C    RADIOLOGY & ADDITIONAL STUDIES:    EKG: < from: 12 Lead ECG (02.17.15 @ 10:15) >  Diagnosis Line Sinus bradycardia  Possible Left atrial enlargement  Borderline ECG    < end of copied text >    < from: CT Head No Cont (19 @ 20:23) > (images reviwed)  IMPRESSION:    No acute intracranial hemorrhage, mass effect, or evidence of acute   territorial infarct.   If clinical symptoms persist, recommend follow-up imaging with MRI brain   if thereare no contraindications.    < end of copied text >

## 2019-03-05 NOTE — PROGRESS NOTE ADULT - ASSESSMENT
63 Female from home with PMH of Non-obstructive CAD, PVCs, S/p Nephrectomy came to hospital for unsteady gait for 1-2 months. Admitted to Tele for suspicion of lumbar radiculopathy. 63 Female from home with PMH of Non-obstructive CAD, PVCs, S/p Nephrectomy came to hospital for unsteady gait for 1-2 months. Admitted to Tele for bradycardia and presumed lumbar radiculopathy.

## 2019-03-05 NOTE — CONSULT NOTE ADULT - ASSESSMENT
Gait problems in patient found to have wide based and unsteady gait with +salena Rhomberg sign concerning for peripheral neuropathy, LS radiculopathy can be also contributing as patient has back pain radiating to the left side.  She also c/o of hip and pelvic pain.    Recommend:  Neurontin 300mg tid for pain  PT  CT LS spine w/o contrast  outpatient  neuro fu with Dr. Aguilar for ncs.,emg  consider ortho eval for hip/pelvic pain

## 2019-03-05 NOTE — PROGRESS NOTE ADULT - PROBLEM SELECTOR PLAN 2
Improve VTE score: 2 (Lovenox sq)  [ ] Previous VTE                                               3  [ ] Thrombophilia                                             2  [ ] Lower limb paralysis                                   2    [ ] Current Cancer                                           2   [x] Immobilization > 24 hrs                              1  [ ] ICU/CCU stay > 24 hours                            1  [x] Age > 60                                                       1 Noted with HR as low as 30's although transient and currently asymptomatic.  Follow up echocardiogram report

## 2019-03-05 NOTE — PROGRESS NOTE ADULT - PROBLEM SELECTOR PLAN 1
Patient presents with weakness in Lt leg, Lt hip point tenderness,   Patient monitored on tele overnight, no heart block noted, either on monitor or on EKG  Neuro Dr Aguilar recommends CT LS spine  Start neurontin 100mg bid  D/c tele Patient presents with weakness in Lt leg, Lt hip point tenderness,   Xray hip with no acute fracture/dislocation  Patient monitored on tele overnight, no heart block noted, either on monitor or on EKG  Neuro Dr Aguilar recommends CT LS spine  Start neurontin 100mg bid  D/c tele

## 2019-03-05 NOTE — PATIENT PROFILE ADULT - STATED REASON FOR ADMISSION
"I was at work, and "I was at work, and started to feel weak in my legs. So I went to the clinic, and they sent me here."

## 2019-03-05 NOTE — PROGRESS NOTE ADULT - SUBJECTIVE AND OBJECTIVE BOX
PGY1 Note discussed with supervising resident and primary attending.    Patient is a 63y old  Female who presents with a chief complaint of Legs weakness and pain (05 Mar 2019 09:59)      INTERVAL HPI/OVERNIGHT EVENTS: patient assessed bedside, reports weakness of LLE and pain in left hip radiating to leg.    MEDICATIONS  (STANDING):  enoxaparin Injectable 40 milliGRAM(s) SubCutaneous daily  gabapentin 100 milliGRAM(s) Oral two times a day  lidocaine   Patch 1 Patch Transdermal daily    MEDICATIONS  (PRN):  acetaminophen   Tablet .. 650 milliGRAM(s) Oral every 6 hours PRN Mild Pain (1 - 3)  cyclobenzaprine 5 milliGRAM(s) Oral three times a day PRN Muscle Spasm      Allergies    No Known Allergies    Intolerances        REVIEW OF SYSTEMS:  CONSTITUTIONAL: No fever, weight loss, or fatigue  RESPIRATORY: No cough, wheezing, chills or shortness of breath  CARDIOVASCULAR: No chest pain, palpitations, dizziness, or leg swelling  GASTROINTESTINAL: No abdominal pain. No nausea, vomiting, diarrhea or constipation.   NEUROLOGICAL: No headaches, memory loss, or focal deficits  SKIN: No itching, burning, rashes, or lesions     Vital Signs Last 24 Hrs  T(C): 36.8 (05 Mar 2019 11:43), Max: 36.8 (05 Mar 2019 11:43)  T(F): 98.2 (05 Mar 2019 11:43), Max: 98.2 (05 Mar 2019 11:43)  HR: 57 (05 Mar 2019 11:43) (53 - 66)  BP: 135/75 (05 Mar 2019 11:43) (126/63 - 142/65)  BP(mean): --  RR: 18 (05 Mar 2019 11:43) (16 - 20)  SpO2: 97% (05 Mar 2019 11:43) (95% - 99%)    PHYSICAL EXAM:  GENERAL: NAD, well-groomed, well-developed  HEAD:  Atraumatic, Normocephalic  EYES: EOMI, PERRLA, conjunctiva and sclera clear  NECK: Supple, No JVD, Normal thyroid  CHEST/LUNG: Clear to auscultation bilaterally; No rales, rhonchi, wheezing, or rubs  HEART: Regular rate and rhythm; No murmurs, rubs, or gallops  ABDOMEN: Soft, Nontender, Nondistended; Bowel sounds present  NERVOUS SYSTEM:  Alert & Oriented X3; Motor Strength 5/5 B/L  EXTREMITIES:  2+ Peripheral Pulses, No edema    LABS:                        11.1   6.45  )-----------( 146      ( 05 Mar 2019 06:40 )             35.3     -    144  |  114<H>  |  26<H>  ----------------------------<  87  4.1   |  24  |  0.99    Ca    7.4<L>      05 Mar 2019 06:40  Phos  3.6     -  Mg     2.0     -05    TPro  6.5  /  Alb  2.9<L>  /  TBili  0.7  /  DBili  x   /  AST  16  /  ALT  18  /  AlkPhos  50  -05    PT/INR - ( 04 Mar 2019 21:00 )   PT: 11.0 sec;   INR: 0.99 ratio         PTT - ( 04 Mar 2019 21:00 )  PTT:28.5 sec  Urinalysis Basic - ( 04 Mar 2019 19:54 )    Color: Yellow / Appearance: Clear / S.020 / pH: x  Gluc: x / Ketone: Negative  / Bili: Negative / Urobili: Negative   Blood: x / Protein: Negative / Nitrite: Negative   Leuk Esterase: Moderate / RBC: 0-2 /HPF / WBC 3-5 /HPF   Sq Epi: x / Non Sq Epi: Few /HPF / Bacteria: Few /HPF      CAPILLARY BLOOD GLUCOSE      POCT Blood Glucose.: 103 mg/dL (04 Mar 2019 18:31)      Consultant(s) Notes Reviewed:  [ x ] YES  [ ] NO PGY1 Note discussed with supervising resident and primary attending.    Patient is a 63y old  Female who presents with a chief complaint of Legs weakness and pain (05 Mar 2019 09:59)      INTERVAL HPI/OVERNIGHT EVENTS: patient assessed bedside, reports weakness of LLE and pain in left hip radiating to leg.    MEDICATIONS  (STANDING):  enoxaparin Injectable 40 milliGRAM(s) SubCutaneous daily  gabapentin 100 milliGRAM(s) Oral two times a day  lidocaine   Patch 1 Patch Transdermal daily    MEDICATIONS  (PRN):  acetaminophen   Tablet .. 650 milliGRAM(s) Oral every 6 hours PRN Mild Pain (1 - 3)  cyclobenzaprine 5 milliGRAM(s) Oral three times a day PRN Muscle Spasm      Allergies    No Known Allergies    Intolerances        REVIEW OF SYSTEMS:  CONSTITUTIONAL: No fever, weight loss, or fatigue  RESPIRATORY: No cough, wheezing, chills or shortness of breath  CARDIOVASCULAR: No chest pain, palpitations, dizziness, or leg swelling  GASTROINTESTINAL: No abdominal pain. No nausea, vomiting, diarrhea or constipation.   NEUROLOGICAL: No headaches, memory loss, or focal deficits  SKIN: No itching, burning, rashes, or lesions     Vital Signs Last 24 Hrs  T(C): 36.8 (05 Mar 2019 11:43), Max: 36.8 (05 Mar 2019 11:43)  T(F): 98.2 (05 Mar 2019 11:43), Max: 98.2 (05 Mar 2019 11:43)  HR: 57 (05 Mar 2019 11:43) (53 - 66)  BP: 135/75 (05 Mar 2019 11:43) (126/63 - 142/65)  BP(mean): --  RR: 18 (05 Mar 2019 11:43) (16 - 20)  SpO2: 97% (05 Mar 2019 11:43) (95% - 99%)    PHYSICAL EXAM:  GENERAL: NAD, well-groomed, well-developed  HEAD:  Atraumatic, Normocephalic  EYES: EOMI, PERRLA, conjunctiva and sclera clear  NECK: Supple, No JVD, Normal thyroid  CHEST/LUNG: Clear to auscultation bilaterally; No rales, rhonchi, wheezing, or rubs  HEART: Regular rate and rhythm; No murmurs, rubs, or gallops  ABDOMEN: Soft, Nontender, Nondistended; Bowel sounds present  NERVOUS SYSTEM:  Alert & Oriented X3; Motor Strength 5/5 B/L  EXTREMITIES:  2+ Peripheral Pulses, No edema    LABS:                        11.1   6.45  )-----------( 146      ( 05 Mar 2019 06:40 )             35.3     -    144  |  114<H>  |  26<H>  ----------------------------<  87  4.1   |  24  |  0.99    Ca    7.4<L>      05 Mar 2019 06:40  Phos  3.6     -  Mg     2.0     -    TPro  6.5  /  Alb  2.9<L>  /  TBili  0.7  /  DBili  x   /  AST  16  /  ALT  18  /  AlkPhos  50  -    PT/INR - ( 04 Mar 2019 21:00 )   PT: 11.0 sec;   INR: 0.99 ratio         PTT - ( 04 Mar 2019 21:00 )  PTT:28.5 sec  Urinalysis Basic - ( 04 Mar 2019 19:54 )    Color: Yellow / Appearance: Clear / S.020 / pH: x  Gluc: x / Ketone: Negative  / Bili: Negative / Urobili: Negative   Blood: x / Protein: Negative / Nitrite: Negative   Leuk Esterase: Moderate / RBC: 0-2 /HPF / WBC 3-5 /HPF   Sq Epi: x / Non Sq Epi: Few /HPF / Bacteria: Few /HPF      CAPILLARY BLOOD GLUCOSE      POCT Blood Glucose.: 103 mg/dL (04 Mar 2019 18:31)    RADIOLOGY: < from: Xray Hip 2-3 Views, Left (19 @ 08:37) >  EXAM:  XR HIP 2-3V LT                            PROCEDURE DATE:  2019          INTERPRETATION:  CLINICAL STATEMENT: Pain.    TECHNIQUE: AP and lateral views of left hip.     COMPARISON: None.    FINDINGS:  There is no acute fracture or dislocation.     Mild joint space narrowing    IMPRESSION:  No radiographic evidence of acute fracture or dislocation. If symptoms   persist, consider CT or MRI exam to exclude occult fracture.      Consultant(s) Notes Reviewed:  [ x ] YES  [ ] NO

## 2019-03-05 NOTE — ED ADULT NURSE NOTE - NSIMPLEMENTINTERV_GEN_ALL_ED
Implemented All Universal Safety Interventions:  [place] to call system. Call bell, personal items and telephone within reach. Instruct patient to call for assistance. Room bathroom lighting operational. Non-slip footwear when patient is off stretcher. Physically safe environment: no spills, clutter or unnecessary equipment. Stretcher in lowest position, wheels locked, appropriate side rails in place.

## 2019-03-06 ENCOUNTER — TRANSCRIPTION ENCOUNTER (OUTPATIENT)
Age: 64
End: 2019-03-06

## 2019-03-06 VITALS
SYSTOLIC BLOOD PRESSURE: 125 MMHG | TEMPERATURE: 98 F | OXYGEN SATURATION: 98 % | DIASTOLIC BLOOD PRESSURE: 67 MMHG | RESPIRATION RATE: 16 BRPM | HEART RATE: 60 BPM

## 2019-03-06 DIAGNOSIS — R27.0 ATAXIA, UNSPECIFIED: ICD-10-CM

## 2019-03-06 DIAGNOSIS — M48.00 SPINAL STENOSIS, SITE UNSPECIFIED: ICD-10-CM

## 2019-03-06 PROCEDURE — 93005 ELECTROCARDIOGRAM TRACING: CPT

## 2019-03-06 PROCEDURE — 82607 VITAMIN B-12: CPT

## 2019-03-06 PROCEDURE — 80053 COMPREHEN METABOLIC PANEL: CPT

## 2019-03-06 PROCEDURE — 93306 TTE W/DOPPLER COMPLETE: CPT

## 2019-03-06 PROCEDURE — 84436 ASSAY OF TOTAL THYROXINE: CPT

## 2019-03-06 PROCEDURE — 73502 X-RAY EXAM HIP UNI 2-3 VIEWS: CPT

## 2019-03-06 PROCEDURE — 82550 ASSAY OF CK (CPK): CPT

## 2019-03-06 PROCEDURE — 85027 COMPLETE CBC AUTOMATED: CPT

## 2019-03-06 PROCEDURE — 97162 PT EVAL MOD COMPLEX 30 MIN: CPT

## 2019-03-06 PROCEDURE — 84443 ASSAY THYROID STIM HORMONE: CPT

## 2019-03-06 PROCEDURE — 99239 HOSP IP/OBS DSCHRG MGMT >30: CPT

## 2019-03-06 PROCEDURE — 70450 CT HEAD/BRAIN W/O DYE: CPT

## 2019-03-06 PROCEDURE — 81001 URINALYSIS AUTO W/SCOPE: CPT

## 2019-03-06 PROCEDURE — 84484 ASSAY OF TROPONIN QUANT: CPT

## 2019-03-06 PROCEDURE — 99285 EMERGENCY DEPT VISIT HI MDM: CPT | Mod: 25

## 2019-03-06 PROCEDURE — 85730 THROMBOPLASTIN TIME PARTIAL: CPT

## 2019-03-06 PROCEDURE — 86140 C-REACTIVE PROTEIN: CPT

## 2019-03-06 PROCEDURE — 72131 CT LUMBAR SPINE W/O DYE: CPT | Mod: 26

## 2019-03-06 PROCEDURE — 82962 GLUCOSE BLOOD TEST: CPT

## 2019-03-06 PROCEDURE — 84100 ASSAY OF PHOSPHORUS: CPT

## 2019-03-06 PROCEDURE — 83735 ASSAY OF MAGNESIUM: CPT

## 2019-03-06 PROCEDURE — 85610 PROTHROMBIN TIME: CPT

## 2019-03-06 PROCEDURE — 72131 CT LUMBAR SPINE W/O DYE: CPT

## 2019-03-06 PROCEDURE — 80061 LIPID PANEL: CPT

## 2019-03-06 PROCEDURE — 36415 COLL VENOUS BLD VENIPUNCTURE: CPT

## 2019-03-06 PROCEDURE — 82553 CREATINE MB FRACTION: CPT

## 2019-03-06 PROCEDURE — 85652 RBC SED RATE AUTOMATED: CPT

## 2019-03-06 PROCEDURE — 71045 X-RAY EXAM CHEST 1 VIEW: CPT

## 2019-03-06 PROCEDURE — 82746 ASSAY OF FOLIC ACID SERUM: CPT

## 2019-03-06 PROCEDURE — 83036 HEMOGLOBIN GLYCOSYLATED A1C: CPT

## 2019-03-06 RX ORDER — GABAPENTIN 400 MG/1
1 CAPSULE ORAL
Qty: 60 | Refills: 0
Start: 2019-03-06 | End: 2019-04-04

## 2019-03-06 RX ADMIN — LIDOCAINE 1 PATCH: 4 CREAM TOPICAL at 12:58

## 2019-03-06 RX ADMIN — GABAPENTIN 100 MILLIGRAM(S): 400 CAPSULE ORAL at 05:50

## 2019-03-06 RX ADMIN — ENOXAPARIN SODIUM 40 MILLIGRAM(S): 100 INJECTION SUBCUTANEOUS at 13:02

## 2019-03-06 RX ADMIN — LIDOCAINE 1 PATCH: 4 CREAM TOPICAL at 00:43

## 2019-03-06 NOTE — PHYSICAL THERAPY INITIAL EVALUATION ADULT - ADDITIONAL COMMENTS
Pt previously independent with ambulation and all ADL's.  Pt reports feeling UE and LE weakness for a few months, with the weakness significantly progressing in her legs for 1 month.  Pt reports standing at work and feeling unsteady on her feet, and was told she was having tremors throughout her whole body.  Pt reports progressive weakness/ heaviness in her legs as she walks +100 feet, for which she has to lean against something to rest and feel steady.

## 2019-03-06 NOTE — PHYSICAL THERAPY INITIAL EVALUATION ADULT - CRITERIA FOR SKILLED THERAPEUTIC INTERVENTIONS
predicted duration of therapy intervention/therapy frequency/functional limitations in following categories/anticipated discharge recommendation/impairments found/rehab potential therapy frequency/anticipated equipment needs at discharge/rehab potential/predicted duration of therapy intervention/impairments found/functional limitations in following categories/anticipated discharge recommendation

## 2019-03-06 NOTE — DISCHARGE NOTE PROVIDER - CARE PROVIDER_API CALL
Nohemy Aguilar (MD)  Neurology  [address] [facility], [address]nd [place] [address]  [place] [address]  Phone: [phone]  Fax: [phone]  Follow Up Time: Rafaela Evans)  Family Medicine  [address] [place] [address]  Phone: [phone]  Fax: [phone]  Follow Up Time: 1 week    Deepti Carlos)  Clinical Neurophysiology; Neurology  [address] [place] [address], [address] [place] [address]  Phone: [phone]  Fax: [phone]  Follow Up Time: 2 weeks

## 2019-03-06 NOTE — DISCHARGE NOTE PROVIDER - CARE PROVIDERS DIRECT ADDRESSES
[email or web address]

## 2019-03-06 NOTE — PHYSICAL THERAPY INITIAL EVALUATION ADULT - GAIT DISTANCE, PT EVAL
200 feet with no AD + 200 feet with straight cane.  Pt reported feeling more steady with straight cane when the unsteadiness comes on

## 2019-03-06 NOTE — PHYSICAL THERAPY INITIAL EVALUATION ADULT - LEVEL OF INDEPENDENCE: GAIT, REHAB EVAL
secondary to Pt report of weakness and feeling unsteady for which patient compensates by pausing for a slight rest, then continuing with ambulation/stand-by assist

## 2019-03-06 NOTE — PROGRESS NOTE ADULT - SUBJECTIVE AND OBJECTIVE BOX
MEDICAL ATTENDING NOTE- LATE ENTRY- patient was seen at about 3pm.    Patient is a 63y old  Female who presents with a chief complaint of Legs weakness and pain (06 Mar 2019 15:16)      INTERVAL HPI/OVERNIGHT EVENTS: no new complaints    MEDICATIONS  (STANDING):  enoxaparin Injectable 40 milliGRAM(s) SubCutaneous daily  gabapentin 100 milliGRAM(s) Oral two times a day  lidocaine   Patch 1 Patch Transdermal daily    MEDICATIONS  (PRN):  acetaminophen   Tablet .. 650 milliGRAM(s) Oral every 6 hours PRN Mild Pain (1 - 3)  cyclobenzaprine 5 milliGRAM(s) Oral three times a day PRN Muscle Spasm      __________________________________________________  ----------------------------------------------------------------------------------  REVIEW OF SYSTEMS: no fever, no SOB, No Chest pain; feels well      Vital Signs Last 24 Hrs  T(C): 36.9 (06 Mar 2019 15:20), Max: 37 (05 Mar 2019 19:58)  T(F): 98.5 (06 Mar 2019 15:20), Max: 98.6 (05 Mar 2019 19:58)  HR: 60 (06 Mar 2019 15:20) (57 - 96)  BP: 125/67 (06 Mar 2019 15:20) (109/70 - 126/81)  BP(mean): --  RR: 16 (06 Mar 2019 15:20) (16 - 18)  SpO2: 98% (06 Mar 2019 15:20) (96% - 99%)    _________________  PHYSICAL EXAM:  ---------------------------   NAD; Normocephalic;   LUNGS - no wheezing  HEART: S1 S2+   ABDOMEN: Soft, Nontender, non distended  EXTREMITIES: no cyanosis; no edema  NERVOUS SYSTEM:  Awake and alert; no new deficits    _________________________________________________  LABS:                        11.1   6.45  )-----------( 146      ( 05 Mar 2019 06:40 )             35.3     03-05    144  |  114<H>  |  26<H>  ----------------------------<  87  4.1   |  24  |  0.99    Ca    7.4<L>      05 Mar 2019 06:40  Phos  3.6     03-05  Mg     2.0     03-05    TPro  6.5  /  Alb  2.9<L>  /  TBili  0.7  /  DBili  x   /  AST  16  /  ALT  18  /  AlkPhos  50  03-05    PT/INR - ( 04 Mar 2019 21:00 )   PT: 11.0 sec;   INR: 0.99 ratio         PTT - ( 04 Mar 2019 21:00 )  PTT:28.5 sec  Urinalysis Basic - ( 04 Mar 2019 19:54 )    Color: Yellow / Appearance: Clear / S.020 / pH: x  Gluc: x / Ketone: Negative  / Bili: Negative / Urobili: Negative   Blood: x / Protein: Negative / Nitrite: Negative   Leuk Esterase: Moderate / RBC: 0-2 /HPF / WBC 3-5 /HPF   Sq Epi: x / Non Sq Epi: Few /HPF / Bacteria: Few /HPF      CAPILLARY BLOOD GLUCOSE                Care Discussed with Consultants :     Plan of care was discussed with patient ; all questions and concerns were addressed and care was aligned with patient's wishes.  Patient has been advised to follow up with PMD upon discharge from the hospital.  Discharge plans discussed with nursing staff , case manger and . MEDICAL ATTENDING NOTE- LATE ENTRY- patient was seen at about 3pm.    Patient is a 63y old  Female who presents with a chief complaint of Legs weakness and pain (06 Mar 2019 15:16)      INTERVAL HPI/OVERNIGHT EVENTS: no new complaints    MEDICATIONS  (STANDING):  enoxaparin Injectable 40 milliGRAM(s) SubCutaneous daily  gabapentin 100 milliGRAM(s) Oral two times a day  lidocaine   Patch 1 Patch Transdermal daily    MEDICATIONS  (PRN):  acetaminophen   Tablet .. 650 milliGRAM(s) Oral every 6 hours PRN Mild Pain (1 - 3)  cyclobenzaprine 5 milliGRAM(s) Oral three times a day PRN Muscle Spasm      __________________________________________________  ----------------------------------------------------------------------------------  REVIEW OF SYSTEMS: no fever, no SOB, No Chest pain; feels well      Vital Signs Last 24 Hrs  T(C): 36.9 (06 Mar 2019 15:20), Max: 37 (05 Mar 2019 19:58)  T(F): 98.5 (06 Mar 2019 15:20), Max: 98.6 (05 Mar 2019 19:58)  HR: 60 (06 Mar 2019 15:20) (57 - 96)  BP: 125/67 (06 Mar 2019 15:20) (109/70 - 126/81)  BP(mean): --  RR: 16 (06 Mar 2019 15:20) (16 - 18)  SpO2: 98% (06 Mar 2019 15:20) (96% - 99%)    _________________  PHYSICAL EXAM:  ---------------------------   NAD; Normocephalic;   LUNGS - no wheezing  HEART: S1 S2+   ABDOMEN: Soft, Nontender, non distended  EXTREMITIES: no cyanosis; no edema  NERVOUS SYSTEM:  Awake and alert; no new deficits    _________________________________________________  LABS:                        11.1   6.45  )-----------( 146      ( 05 Mar 2019 06:40 )             35.3     03-05    144  |  114<H>  |  26<H>  ----------------------------<  87  4.1   |  24  |  0.99    Ca    7.4<L>      05 Mar 2019 06:40  Phos  3.6     03-05  Mg     2.0     -05    TPro  6.5  /  Alb  2.9<L>  /  TBili  0.7  /  DBili  x   /  AST  16  /  ALT  18  /  AlkPhos  50  03-05    PT/INR - ( 04 Mar 2019 21:00 )   PT: 11.0 sec;   INR: 0.99 ratio         PTT - ( 04 Mar 2019 21:00 )  PTT:28.5 sec  Urinalysis Basic - ( 04 Mar 2019 19:54 )    Color: Yellow / Appearance: Clear / S.020 / pH: x  Gluc: x / Ketone: Negative  / Bili: Negative / Urobili: Negative   Blood: x / Protein: Negative / Nitrite: Negative   Leuk Esterase: Moderate / RBC: 0-2 /HPF / WBC 3-5 /HPF   Sq Epi: x / Non Sq Epi: Few /HPF / Bacteria: Few /HPF      CAPILLARY BLOOD GLUCOSE    RADIOLOGY REVIEW:  < from: CT Lumbar Spine No Cont (19 @ 10:33) >    INTERPRETATION:  CT lumbar spine without contrast    History lower extremity weakness, ataxia    There is mild upper lumbar levoscoliosis. There is no compression   deformity or pars defect. There is mild degenerative endplate change at   the otherwise normal L1-L2 level. There is moderately severe disc space   narrowing and mild annular osteophyte at L2-L3 without spinal canal or   significant foraminal stenosis    There is mild disc space narrowing at L3-L4. There is a limbus deformity   in the anterior upper L4 endplate. There is mild annular disc osteophyte   complex with mild bilateral foraminal narrowing but no central canal   stenosis    The L4-5 disc space is maintained. There is minimal anterolisthesis   related to moderate bilateral facet arthropathy. There is mild annular   disc bulging. Together these changes result in mild central canal stenosis    There is mild to moderate degenerative facet change at L5-S1, worse on   the left. There is mild disc space narrowing without central canal or   significant foraminal stenosis    IMPRESSION:    Spondylosis and degenerative spondylolisthesis notable for mild spinal   stenosis at L4-5. No acute findings    < end of copied text >                Care Discussed with Consultants :     Plan of care was discussed with patient ; all questions and concerns were addressed and care was aligned with patient's wishes.  Patient has been advised to follow up with PMD upon discharge from the hospital.  Discharge plans discussed with nursing staff , case manger and .

## 2019-03-06 NOTE — DISCHARGE NOTE PROVIDER - PROVIDER TOKENS
PROVIDER:[TOKEN:[[id number]]] PROVIDER:[TOKEN:[[id number]],FOLLOWUP:[1 week]],PROVIDER:[TOKEN:[[id number]],FOLLOWUP:[2 weeks]]

## 2019-03-06 NOTE — DISCHARGE NOTE PROVIDER - NSDCCPCAREPLAN_GEN_ALL_CORE_FT
PRINCIPAL DISCHARGE DIAGNOSIS  Problem: Lumbar spondylosis  Assessment and Plan of Treatment: You were admitted for pain and weakness of legs. Xray of your hip did not show a fracture or dislocation. You were also seen by neurologist Dr Aguilar who recommended Ct scan of your spine which showed spondylosis and mild stenosis of spinal canal at L4-L5 vertebral level of your lower back. You were satredt on neurontin as per neurologist recommendations. Follow up with Dr Aguilar as outpatient in 1 week for further management. Also continue with physical therapy as per physical therapist recommendations.      SECONDARY DISCHARGE DIAGNOSES  Problem: Prediabetes  Assessment and Plan of Treatment: Your hemoglobin A1C (which reflects blood glucose levels over prior 3 months) was 6.4 on admission, which means you are prediabetic. Make sure you consume carnohydrate consistent diet as well as exercise on a regular basis to prevent it from advancing to diabetes. PRINCIPAL DISCHARGE DIAGNOSIS  Problem: Lumbar spondylosis  Assessment and Plan of Treatment: You were admitted for pain and weakness of legs. Xray of your hip did not show a fracture or dislocation. You were also seen by neurologist who recommended Ct scan of your spine which showed spondylosis and mild stenosis of spinal canal at L4-L5 vertebral level of your lower back. You were satredt on neurontin as per neurologist recommendations. Follow up with neurologist as outpatient in 1 week for further management. Also continue with physical therapy as per physical therapist recommendations.      SECONDARY DISCHARGE DIAGNOSES  Problem: Prediabetes  Assessment and Plan of Treatment: Your hemoglobin A1C (which reflects blood glucose levels over prior 3 months) was 6.4 on admission, which means you are prediabetic. Make sure you consume carnohydrate consistent diet as well as exercise on a regular basis to prevent it from advancing to diabetes.

## 2019-03-06 NOTE — DISCHARGE NOTE PROVIDER - HOSPITAL COURSE
63 Female from home with PMH of Non-obstructive CAD, PVCs, S/p Nephrectomy (40 yrs ago) came to hospital for unsteady gait for 1-2 months. She has been feeling decreased strength in lower extremity with some pain in her Lt leg. Pain is dull and exacerbates with movement of leg. Pt states, she can't stand still for more than a minute due to her unsteadiness. Pain also radiates down to her left thigh. She has been moving some furniture at home but denies trauma or fall. Pt has noted some frontal headache and denies dizziness, focal weakness, chest pain, shortness of breath, problems with urine/bowel incontinence. ED Course: Hemodynamically stable. Labs were unremarkable. CT head was normal and EKG showed Short ND interval. CXR was clear and she was given 1 liter bolus.    On floor, patient's Xray hip did not show an acute fracture/dislocation. She was evaluated by neuro Dr Aguilar who recommended ct ls spine and gabapentin. Patient was started on neurontin 100mg bid, ct s/o Spondylosis and degenerative spondylolisthesis notable for mild spinal stenosis at L4-5. No acute findings. PT recc OPT. Patient also oted to have asymptomatic bradycardia, echo grossly normal LVSF and gr2 dd.    Please note this is only a brieft summary of events, refer to medical charts for complete information.

## 2019-03-06 NOTE — PHYSICAL THERAPY INITIAL EVALUATION ADULT - FOLLOWS COMMANDS/ANSWERS QUESTIONS, REHAB EVAL
unable to follow multi-step instructions/100% of the time able to follow multistep instructions/100% of the time

## 2019-03-06 NOTE — PROGRESS NOTE ADULT - PROBLEM SELECTOR PLAN 1
Spinal stenosis with lower back pain and associated unsteady or ataxic gait   - CT scan findings reviewed with patient and her family ( son and dtr)  All their questions were answered and addressed. She has been started on trial of Neurontin.  [facility] with [facility] upon discharge from the hospital.  Her symptoms are currently resolved and controlled. She feels well and agreeable to discharge home today.

## 2019-03-06 NOTE — PROGRESS NOTE ADULT - PROBLEM SELECTOR PLAN 2
Stable HR  Remains asymptomatic  Outpatient PCp follow up within one week of discharge for continued care and monitoring

## 2019-03-06 NOTE — DISCHARGE NOTE NURSING/CASE MANAGEMENT/SOCIAL WORK - NSDCDPATPORTLINK_GEN_ALL_CORE
You can access the [facility], offered by [facility], by registering with the following website: [email or web address]

## 2019-03-06 NOTE — PHYSICAL THERAPY INITIAL EVALUATION ADULT - MANUAL MUSCLE TESTING RESULTS, REHAB EVAL
R shoulder 4+/5.  R elbow 3+/5.  R wrist 4/5.  R  strength 4/5.  L shoulder 4+/5.  L elbow 3/5.  L wrist 3+/5.  L  3+/5.  R hip 3+/5 with sudden give-away.  R knee 4+/5.  R ankle 4+/5.  L Hip 3/5.  L knee 4/5.  L ankle 2+/5

## 2019-03-06 NOTE — PROGRESS NOTE ADULT - ATTENDING COMMENTS
Discharged in stable medical condition
Patient was seen and examined by myself. Case was discussed with house staff in details. I have reviewed and agree with the plan as outlined above with edits where appropriate.                            11.1   6.45  )-----------( 146      ( 05 Mar 2019 06:40 )             35.3       03-05    144  |  114<H>  |  26<H>  ----------------------------<  87  4.1   |  24  |  0.99    Ca    7.4<L>      05 Mar 2019 06:40  Phos  3.6     03-05  Mg     2.0     03-05    TPro  6.5  /  Alb  2.9<L>  /  TBili  0.7  /  DBili  x   /  AST  16  /  ALT  18  /  AlkPhos  50  03-05    A/P: 64 y/o F with   1. Presumed sciatica with  lumbar radiculopathy  2. Unsteady gait with ataxia  3. Bradycardia- asymptomatic  4.  hypoalbuminemia    Follow up CT lumbar spine  Fall precautions  Pain control  Nutritional supplements encouraged  discontinue telemetry once HR stabilizes and patient remains asymptomatic.  Plan discussed with patient in details at bedside and all her questions / concerns were addressed

## 2019-03-06 NOTE — PHYSICAL THERAPY INITIAL EVALUATION ADULT - PATIENT PROFILE REVIEW, REHAB EVAL
yes/including labs and imaging.  Pending Lumbar X-Ray noted, Pt cleared for evaluation by Resident including labs and imaging.  Pending Lumbar X-Ray noted, Pt cleared for evaluation by Resident prior to CT/yes including labs and imaging.  Pending Lumbar CT noted, Pt cleared for evaluation by Resident prior to CT/yes

## 2019-03-06 NOTE — PROGRESS NOTE ADULT - ASSESSMENT
63 Female from home with PMH of Non-obstructive CAD, PVCs, S/p Nephrectomy came to hospital for unsteady gait for 1-2 months. Admitted to Tele for bradycardia and presumed lumbar radiculopathy.       A/P: 64 y/o F with   1. Spinal stenosis causing lower back pain and associated occasionnal unsteady gait   2. Bradycardia- asymptomatic  3. Hypoalbuminemia 63 Female from home with PMH of Non-obstructive CAD, PVCs, S/p Nephrectomy came to hospital for unsteady gait for 1-2 months. Admitted to Tele for bradycardia and presumed lumbar radiculopathy.       A/P: 64 y/o F with   1. Spinal stenosis at L4-5 causing lower back pain and associated occasional unsteady gait   2. Bradycardia- asymptomatic  3. Hypoalbuminemia

## 2019-04-24 ENCOUNTER — APPOINTMENT (OUTPATIENT)
Dept: NEUROLOGY | Facility: CLINIC | Age: 64
End: 2019-04-24

## 2020-09-23 ENCOUNTER — OUTPATIENT (OUTPATIENT)
Dept: OUTPATIENT SERVICES | Facility: HOSPITAL | Age: 65
LOS: 1 days | End: 2020-09-23
Payer: COMMERCIAL

## 2020-09-23 ENCOUNTER — APPOINTMENT (OUTPATIENT)
Dept: MRI IMAGING | Facility: CLINIC | Age: 65
End: 2020-09-23
Payer: COMMERCIAL

## 2020-09-23 DIAGNOSIS — Q21.0 VENTRICULAR SEPTAL DEFECT: ICD-10-CM

## 2020-09-23 DIAGNOSIS — Z90.5 ACQUIRED ABSENCE OF KIDNEY: Chronic | ICD-10-CM

## 2020-09-23 PROCEDURE — A9585: CPT

## 2020-09-23 PROCEDURE — 75561 CARDIAC MRI FOR MORPH W/DYE: CPT | Mod: 26

## 2020-09-23 PROCEDURE — 75565 CARD MRI VELOC FLOW MAPPING: CPT | Mod: 26

## 2020-09-23 PROCEDURE — 75561 CARDIAC MRI FOR MORPH W/DYE: CPT

## 2020-09-23 PROCEDURE — 75565 CARD MRI VELOC FLOW MAPPING: CPT

## 2020-10-08 ENCOUNTER — APPOINTMENT (OUTPATIENT)
Dept: ELECTROPHYSIOLOGY | Facility: CLINIC | Age: 65
End: 2020-10-08
Payer: COMMERCIAL

## 2020-10-08 VITALS
SYSTOLIC BLOOD PRESSURE: 105 MMHG | HEART RATE: 89 BPM | OXYGEN SATURATION: 96 % | HEIGHT: 67 IN | BODY MASS INDEX: 31.71 KG/M2 | WEIGHT: 202 LBS | RESPIRATION RATE: 16 BRPM | DIASTOLIC BLOOD PRESSURE: 72 MMHG

## 2020-10-08 PROCEDURE — 93000 ELECTROCARDIOGRAM COMPLETE: CPT

## 2020-10-08 PROCEDURE — 99205 OFFICE O/P NEW HI 60 MIN: CPT

## 2020-10-08 RX ORDER — MELOXICAM 15 MG/1
15 TABLET ORAL DAILY
Qty: 30 | Refills: 0 | Status: DISCONTINUED | COMMUNITY
Start: 2018-04-23 | End: 2020-10-08

## 2020-10-08 NOTE — HISTORY OF PRESENT ILLNESS
[FreeTextEntry1] : Apurva Nava MD\par \par Oscar Jasso is a 66y/o woman with Hx of positive tilt table test/orthostatic hypotension, non obstructive CAD, renal donor s/p nephrectomy, VSD, and recurring palpitations with attempted AT ablation (not performed- 2015) who presents today for initial evaluation. In 2015, underwent EP study which revealed runs of AT, unable to be sustained long enough to map or perform ablation. Has been maintained on metoprolol. Recurring daily palpitations which come and go spontaneously, moderate in intensity, lasting up to several minutes in duration. No known relieving factors. Unable to titrate up on metoprolol secondary to orthostasis. Denies chest pain, SOB, syncope or near syncope.

## 2020-10-08 NOTE — DISCUSSION/SUMMARY
[FreeTextEntry1] : Oscar Jasso is a 64y/o woman with Hx of positive tilt table test/orthostatic hypotension, non obstructive CAD, renal donor s/p nephrectomy, VSD, and recurring palpitations with attempted AT ablation (not performed- 2015) who presents today for initial evaluation.\par \par Impression:\par \par 1. Paroxysmal AT: EKG today NSR. Review of recent Holter reveals frequent runs of AT. Given highly symptomatic and unable to tolerate increased beta blocker dosing, recommend undergoing EP study and AT ablation. Risks, benefits, and alternatives to procedure discussed at length. Risks including that of bleeding, infection, stroke, and cardiac tamponade discussed and he verbalizes understanding of all.\par \par 2. Orthostatic hypotension: Instructed on safety mechanisms such as staying well hydrated, utilizing salt, avoiding prolonged standing with knees locked, and to lie down with feet elevated if symptoms of near syncope occur. Can also consider use of compression socks and should avoid known triggers for syncope such as alcohol and warm or crowded environments. Move slowly during positional changes. \par \par Plan for AT ablation. \par \par

## 2020-10-08 NOTE — PHYSICAL EXAM
[General Appearance - Well Developed] : well developed [Normal Appearance] : normal appearance [Well Groomed] : well groomed [General Appearance - Well Nourished] : well nourished [No Deformities] : no deformities [General Appearance - In No Acute Distress] : no acute distress [Normal Conjunctiva] : the conjunctiva exhibited no abnormalities [Eyelids - No Xanthelasma] : the eyelids demonstrated no xanthelasmas [Normal Oral Mucosa] : normal oral mucosa [No Oral Pallor] : no oral pallor [No Oral Cyanosis] : no oral cyanosis [Normal Jugular Venous A Waves Present] : normal jugular venous A waves present [Normal Jugular Venous V Waves Present] : normal jugular venous V waves present [No Jugular Venous Bhakta A Waves] : no jugular venous bhakta A waves [Heart Rate And Rhythm] : heart rate and rhythm were normal [Heart Sounds] : normal S1 and S2 [Murmurs] : no murmurs present [Respiration, Rhythm And Depth] : normal respiratory rhythm and effort [Exaggerated Use Of Accessory Muscles For Inspiration] : no accessory muscle use [Auscultation Breath Sounds / Voice Sounds] : lungs were clear to auscultation bilaterally [Abdomen Soft] : soft [Abdomen Tenderness] : non-tender [Abdomen Mass (___ Cm)] : no abdominal mass palpated [Abnormal Walk] : normal gait [Gait - Sufficient For Exercise Testing] : the gait was sufficient for exercise testing [Nail Clubbing] : no clubbing of the fingernails [Cyanosis, Localized] : no localized cyanosis [Petechial Hemorrhages (___cm)] : no petechial hemorrhages [Skin Color & Pigmentation] : normal skin color and pigmentation [] : no rash [No Venous Stasis] : no venous stasis [Skin Lesions] : no skin lesions [No Skin Ulcers] : no skin ulcer [No Xanthoma] : no  xanthoma was observed [Oriented To Time, Place, And Person] : oriented to person, place, and time [Affect] : the affect was normal [Mood] : the mood was normal [No Anxiety] : not feeling anxious

## 2020-10-09 ENCOUNTER — NON-APPOINTMENT (OUTPATIENT)
Age: 65
End: 2020-10-09

## 2020-10-29 ENCOUNTER — INPATIENT (INPATIENT)
Facility: HOSPITAL | Age: 65
LOS: 7 days | Discharge: HOME | End: 2020-11-06
Attending: INTERNAL MEDICINE | Admitting: INTERNAL MEDICINE
Payer: COMMERCIAL

## 2020-10-29 ENCOUNTER — OUTPATIENT (OUTPATIENT)
Dept: OUTPATIENT SERVICES | Facility: HOSPITAL | Age: 65
LOS: 1 days | End: 2020-10-29

## 2020-10-29 ENCOUNTER — NON-APPOINTMENT (OUTPATIENT)
Age: 65
End: 2020-10-29

## 2020-10-29 VITALS
SYSTOLIC BLOOD PRESSURE: 129 MMHG | RESPIRATION RATE: 16 BRPM | OXYGEN SATURATION: 95 % | HEIGHT: 67 IN | DIASTOLIC BLOOD PRESSURE: 85 MMHG | HEART RATE: 81 BPM | TEMPERATURE: 98 F

## 2020-10-29 VITALS
RESPIRATION RATE: 26 BRPM | TEMPERATURE: 98 F | DIASTOLIC BLOOD PRESSURE: 83 MMHG | SYSTOLIC BLOOD PRESSURE: 149 MMHG | HEIGHT: 67 IN | OXYGEN SATURATION: 97 % | WEIGHT: 182.98 LBS | HEART RATE: 80 BPM

## 2020-10-29 DIAGNOSIS — R74.01 ELEVATION OF LEVELS OF LIVER TRANSAMINASE LEVELS: ICD-10-CM

## 2020-10-29 DIAGNOSIS — Z90.5 ACQUIRED ABSENCE OF KIDNEY: Chronic | ICD-10-CM

## 2020-10-29 DIAGNOSIS — Z29.9 ENCOUNTER FOR PROPHYLACTIC MEASURES, UNSPECIFIED: ICD-10-CM

## 2020-10-29 DIAGNOSIS — R09.89 OTHER SPECIFIED SYMPTOMS AND SIGNS INVOLVING THE CIRCULATORY AND RESPIRATORY SYSTEMS: ICD-10-CM

## 2020-10-29 DIAGNOSIS — R07.9 CHEST PAIN, UNSPECIFIED: ICD-10-CM

## 2020-10-29 DIAGNOSIS — R00.2 PALPITATIONS: ICD-10-CM

## 2020-10-29 DIAGNOSIS — Z86.79 PERSONAL HISTORY OF OTHER DISEASES OF THE CIRCULATORY SYSTEM: ICD-10-CM

## 2020-10-29 DIAGNOSIS — R06.02 SHORTNESS OF BREATH: ICD-10-CM

## 2020-10-29 DIAGNOSIS — I47.1 SUPRAVENTRICULAR TACHYCARDIA: ICD-10-CM

## 2020-10-29 DIAGNOSIS — M79.89 OTHER SPECIFIED SOFT TISSUE DISORDERS: ICD-10-CM

## 2020-10-29 LAB
ALBUMIN SERPL ELPH-MCNC: 3.9 G/DL — SIGNIFICANT CHANGE UP (ref 3.3–5)
ALBUMIN SERPL ELPH-MCNC: 4 G/DL — SIGNIFICANT CHANGE UP (ref 3.3–5)
ALP SERPL-CCNC: 81 U/L — SIGNIFICANT CHANGE UP (ref 40–120)
ALP SERPL-CCNC: 82 U/L — SIGNIFICANT CHANGE UP (ref 40–120)
ALT FLD-CCNC: 53 U/L — HIGH (ref 4–33)
ALT FLD-CCNC: 55 U/L — HIGH (ref 4–33)
ANION GAP SERPL CALC-SCNC: 12 MMO/L — SIGNIFICANT CHANGE UP (ref 7–14)
ANION GAP SERPL CALC-SCNC: 13 MMO/L — SIGNIFICANT CHANGE UP (ref 7–14)
AST SERPL-CCNC: 45 U/L — HIGH (ref 4–32)
AST SERPL-CCNC: 51 U/L — HIGH (ref 4–32)
B PERT DNA SPEC QL NAA+PROBE: SIGNIFICANT CHANGE UP
BASOPHILS # BLD AUTO: 0.05 K/UL — SIGNIFICANT CHANGE UP (ref 0–0.2)
BASOPHILS NFR BLD AUTO: 0.5 % — SIGNIFICANT CHANGE UP (ref 0–2)
BILIRUB SERPL-MCNC: 0.6 MG/DL — SIGNIFICANT CHANGE UP (ref 0.2–1.2)
BILIRUB SERPL-MCNC: 0.6 MG/DL — SIGNIFICANT CHANGE UP (ref 0.2–1.2)
BLD GP AB SCN SERPL QL: NEGATIVE — SIGNIFICANT CHANGE UP
BUN SERPL-MCNC: 22 MG/DL — SIGNIFICANT CHANGE UP (ref 7–23)
BUN SERPL-MCNC: 24 MG/DL — HIGH (ref 7–23)
C PNEUM DNA SPEC QL NAA+PROBE: SIGNIFICANT CHANGE UP
CALCIUM SERPL-MCNC: 9.1 MG/DL — SIGNIFICANT CHANGE UP (ref 8.4–10.5)
CALCIUM SERPL-MCNC: 9.3 MG/DL — SIGNIFICANT CHANGE UP (ref 8.4–10.5)
CHLORIDE SERPL-SCNC: 107 MMOL/L — SIGNIFICANT CHANGE UP (ref 98–107)
CHLORIDE SERPL-SCNC: 107 MMOL/L — SIGNIFICANT CHANGE UP (ref 98–107)
CO2 SERPL-SCNC: 21 MMOL/L — LOW (ref 22–31)
CO2 SERPL-SCNC: 22 MMOL/L — SIGNIFICANT CHANGE UP (ref 22–31)
CREAT SERPL-MCNC: 1.11 MG/DL — SIGNIFICANT CHANGE UP (ref 0.5–1.3)
CREAT SERPL-MCNC: 1.14 MG/DL — SIGNIFICANT CHANGE UP (ref 0.5–1.3)
EOSINOPHIL # BLD AUTO: 0.05 K/UL — SIGNIFICANT CHANGE UP (ref 0–0.5)
EOSINOPHIL NFR BLD AUTO: 0.5 % — SIGNIFICANT CHANGE UP (ref 0–6)
FLUAV H1 2009 PAND RNA SPEC QL NAA+PROBE: SIGNIFICANT CHANGE UP
FLUAV H1 RNA SPEC QL NAA+PROBE: SIGNIFICANT CHANGE UP
FLUAV H3 RNA SPEC QL NAA+PROBE: SIGNIFICANT CHANGE UP
FLUAV SUBTYP SPEC NAA+PROBE: SIGNIFICANT CHANGE UP
FLUBV RNA SPEC QL NAA+PROBE: SIGNIFICANT CHANGE UP
GLUCOSE SERPL-MCNC: 116 MG/DL — HIGH (ref 70–99)
GLUCOSE SERPL-MCNC: 122 MG/DL — HIGH (ref 70–99)
HADV DNA SPEC QL NAA+PROBE: SIGNIFICANT CHANGE UP
HCOV PNL SPEC NAA+PROBE: SIGNIFICANT CHANGE UP
HCT VFR BLD CALC: 39 % — SIGNIFICANT CHANGE UP (ref 34.5–45)
HCT VFR BLD CALC: 39.4 % — SIGNIFICANT CHANGE UP (ref 34.5–45)
HGB BLD-MCNC: 11.7 G/DL — SIGNIFICANT CHANGE UP (ref 11.5–15.5)
HGB BLD-MCNC: 12 G/DL — SIGNIFICANT CHANGE UP (ref 11.5–15.5)
HMPV RNA SPEC QL NAA+PROBE: SIGNIFICANT CHANGE UP
HPIV1 RNA SPEC QL NAA+PROBE: SIGNIFICANT CHANGE UP
HPIV2 RNA SPEC QL NAA+PROBE: SIGNIFICANT CHANGE UP
HPIV3 RNA SPEC QL NAA+PROBE: SIGNIFICANT CHANGE UP
HPIV4 RNA SPEC QL NAA+PROBE: SIGNIFICANT CHANGE UP
IMM GRANULOCYTES NFR BLD AUTO: 0.3 % — SIGNIFICANT CHANGE UP (ref 0–1.5)
LYMPHOCYTES # BLD AUTO: 2.79 K/UL — SIGNIFICANT CHANGE UP (ref 1–3.3)
LYMPHOCYTES # BLD AUTO: 25.4 % — SIGNIFICANT CHANGE UP (ref 13–44)
MCHC RBC-ENTMCNC: 26.8 PG — LOW (ref 27–34)
MCHC RBC-ENTMCNC: 26.8 PG — LOW (ref 27–34)
MCHC RBC-ENTMCNC: 30 % — LOW (ref 32–36)
MCHC RBC-ENTMCNC: 30.5 % — LOW (ref 32–36)
MCV RBC AUTO: 88.1 FL — SIGNIFICANT CHANGE UP (ref 80–100)
MCV RBC AUTO: 89.2 FL — SIGNIFICANT CHANGE UP (ref 80–100)
MONOCYTES # BLD AUTO: 0.43 K/UL — SIGNIFICANT CHANGE UP (ref 0–0.9)
MONOCYTES NFR BLD AUTO: 3.9 % — SIGNIFICANT CHANGE UP (ref 2–14)
NEUTROPHILS # BLD AUTO: 7.62 K/UL — HIGH (ref 1.8–7.4)
NEUTROPHILS NFR BLD AUTO: 69.4 % — SIGNIFICANT CHANGE UP (ref 43–77)
NRBC # FLD: 0 K/UL — SIGNIFICANT CHANGE UP (ref 0–0)
NRBC # FLD: 0 K/UL — SIGNIFICANT CHANGE UP (ref 0–0)
NT-PROBNP SERPL-SCNC: 2202 PG/ML — SIGNIFICANT CHANGE UP
PLATELET # BLD AUTO: 146 K/UL — LOW (ref 150–400)
PLATELET # BLD AUTO: 160 K/UL — SIGNIFICANT CHANGE UP (ref 150–400)
PMV BLD: 11.8 FL — SIGNIFICANT CHANGE UP (ref 7–13)
PMV BLD: 12.5 FL — SIGNIFICANT CHANGE UP (ref 7–13)
POTASSIUM SERPL-MCNC: 4.7 MMOL/L — SIGNIFICANT CHANGE UP (ref 3.5–5.3)
POTASSIUM SERPL-MCNC: 4.8 MMOL/L — SIGNIFICANT CHANGE UP (ref 3.5–5.3)
POTASSIUM SERPL-SCNC: 4.7 MMOL/L — SIGNIFICANT CHANGE UP (ref 3.5–5.3)
POTASSIUM SERPL-SCNC: 4.8 MMOL/L — SIGNIFICANT CHANGE UP (ref 3.5–5.3)
PROT SERPL-MCNC: 7.4 G/DL — SIGNIFICANT CHANGE UP (ref 6–8.3)
PROT SERPL-MCNC: 7.4 G/DL — SIGNIFICANT CHANGE UP (ref 6–8.3)
RAPID RVP RESULT: SIGNIFICANT CHANGE UP
RBC # BLD: 4.37 M/UL — SIGNIFICANT CHANGE UP (ref 3.8–5.2)
RBC # BLD: 4.47 M/UL — SIGNIFICANT CHANGE UP (ref 3.8–5.2)
RBC # FLD: 15.1 % — HIGH (ref 10.3–14.5)
RBC # FLD: 15.4 % — HIGH (ref 10.3–14.5)
RH IG SCN BLD-IMP: POSITIVE — SIGNIFICANT CHANGE UP
RSV RNA SPEC QL NAA+PROBE: SIGNIFICANT CHANGE UP
RV+EV RNA SPEC QL NAA+PROBE: SIGNIFICANT CHANGE UP
SARS-COV-2 RNA SPEC QL NAA+PROBE: SIGNIFICANT CHANGE UP
SODIUM SERPL-SCNC: 140 MMOL/L — SIGNIFICANT CHANGE UP (ref 135–145)
SODIUM SERPL-SCNC: 142 MMOL/L — SIGNIFICANT CHANGE UP (ref 135–145)
TROPONIN T, HIGH SENSITIVITY: 26 NG/L — SIGNIFICANT CHANGE UP (ref ?–14)
TROPONIN T, HIGH SENSITIVITY: 27 NG/L — SIGNIFICANT CHANGE UP (ref ?–14)
TSH SERPL-MCNC: 1.06 UIU/ML — SIGNIFICANT CHANGE UP (ref 0.27–4.2)
WBC # BLD: 10.97 K/UL — HIGH (ref 3.8–10.5)
WBC # BLD: 11.48 K/UL — HIGH (ref 3.8–10.5)
WBC # FLD AUTO: 10.97 K/UL — HIGH (ref 3.8–10.5)
WBC # FLD AUTO: 11.48 K/UL — HIGH (ref 3.8–10.5)

## 2020-10-29 PROCEDURE — 99285 EMERGENCY DEPT VISIT HI MDM: CPT

## 2020-10-29 PROCEDURE — 99233 SBSQ HOSP IP/OBS HIGH 50: CPT

## 2020-10-29 PROCEDURE — 93010 ELECTROCARDIOGRAM REPORT: CPT

## 2020-10-29 PROCEDURE — 99223 1ST HOSP IP/OBS HIGH 75: CPT

## 2020-10-29 PROCEDURE — 71046 X-RAY EXAM CHEST 2 VIEWS: CPT | Mod: 26

## 2020-10-29 PROCEDURE — 71275 CT ANGIOGRAPHY CHEST: CPT | Mod: 26

## 2020-10-29 RX ORDER — ENOXAPARIN SODIUM 100 MG/ML
40 INJECTION SUBCUTANEOUS DAILY
Refills: 0 | Status: DISCONTINUED | OUTPATIENT
Start: 2020-10-29 | End: 2020-10-30

## 2020-10-29 RX ORDER — FUROSEMIDE 40 MG
20 TABLET ORAL ONCE
Refills: 0 | Status: COMPLETED | OUTPATIENT
Start: 2020-10-29 | End: 2020-10-29

## 2020-10-29 RX ADMIN — Medication 20 MILLIGRAM(S): at 19:43

## 2020-10-29 NOTE — H&P PST ADULT - HISTORY OF PRESENT ILLNESS
66y/o female scheduled for complex ablation on 11/3/2020.  Pt states, "hx of orthostatic htn, renal donor, s/p left nephrectomy, VSD, recurring palpitations, failed attempt of AT ablation 2015.  Has sob on exertion since 2015, worsening over time, controlled with metoprolol.  Was instructed to hold metoprolol 10 days prior to procedure by EP, last dose 10/24/2020.  Since stopping metoprolol has sob at rest."

## 2020-10-29 NOTE — H&P ADULT - NSICDXPASTMEDICALHX_GEN_ALL_CORE_FT
PAST MEDICAL HISTORY:  Atrial tachycardia     History of orthostatic hypotension     Single kidney     Ventricular septal defect

## 2020-10-29 NOTE — ED ADULT TRIAGE NOTE - CHIEF COMPLAINT QUOTE
pt brought in by ems from the doctors office, pt at the office for pre procedure screening. pt scheduled for an ablation for atrial tachycardia. pt c/o shortness of breath and palpitations. heart rate within normal limits in triage

## 2020-10-29 NOTE — H&P ADULT - PROBLEM SELECTOR PLAN 3
2 episodes of chest pain overnight a few days ago, stabbing in nature, intermittent and left sided. Atypical in nature. Trop 27--26  -ischemic eval with likely stress test once optimized and likely after ablation  -if CP recurs, repeat EKG and troponin 2 episodes of chest pain overnight a few days ago, stabbing in nature, intermittent and left sided. Atypical in nature. Trop 27--26  -ischemic eval with likely stress test once optimized and likely after ablation  -if CP recurs, repeat EKG and troponin  -CTA chest to r/o PE

## 2020-10-29 NOTE — H&P PST ADULT - NSICDXPASTSURGICALHX_GEN_ALL_CORE_FT
PAST SURGICAL HISTORY:  S/p nephrectomy donated kidney     PAST SURGICAL HISTORY:  S/p nephrectomy donated kidney- left 1984

## 2020-10-29 NOTE — ED PROVIDER NOTE - PMH
Atrial tachycardia    History of orthostatic hypotension    Single kidney    Ventricular septal defect

## 2020-10-29 NOTE — H&P ADULT - PROBLEM SELECTOR PLAN 5
AST 45 ALT 53 and normal one year ago. This may be related to congestive hepatopathy if with CHF though unclear at this time  -RUQ US  -hep panel  -repeat LFTs in AM

## 2020-10-29 NOTE — CONSULT NOTE ADULT - ASSESSMENT
This is a 65 year old woman with a pmhx of AT s/p ablation (2015), orthostatic HTN, STYLES, non-obstructive CAD, PVCs, renal donor S/p left Nephrectomy (40 yrs ago) who present to pre surgical test for clearance for complex ablation on 11/3/2020 and was found to be SOB at rest with palpitation and TWI. Patient was BIBEMS from [facility] to [facility] ED for further ischemic workup and AT Ablation. Discussed EP study and AT ablation and patient is consented. The risk and benefits for each procedure were explain in detail which included but not limited to bleeding, infection, stroke, cardiac tamponade and death. Patient expressed understanding an all questions were answered     Plan:  - Continuous telemetric monitoring for AT  - Monitor electrolytes and replete for K>4 and Mg>2  - Obtain TTE  - F/U labs and CXR  - Ischemic work-up per cardiology team  - Discussed EP study and AT ablation and patient is consented. The risk and benefits for each procedure were explain in detail which included but not limited to bleeding, infection, stroke, cardiac tamponade and death. Patient expressed understanding an all questions were answered    Gary Hunt PA-C    This is a 65 year old woman with a pmhx of failed AT s/p ablation (2015), orthostatic HTN, STYLES, non-obstructive CAD, PVCs, renal donor S/p left Nephrectomy (40 yrs ago) who present to pre surgical test for clearance for complex ablation on 11/3/2020 and was found to be SOB at rest with palpitation and TWI. Patient was BIBEMS from [facility] to [facility] ED for further ischemic workup and AT Ablation. Discussed EP study and AT ablation and patient is consented. The risk and benefits for each procedure were explain in detail which included but not limited to bleeding, infection, stroke, cardiac tamponade and death. Patient expressed understanding an all questions were answered     Plan:  - Continuous telemetric monitoring for AT  - Monitor electrolytes and replete for K>4 and Mg>2  - Obtain TTE  - F/U labs and CXR  - Ischemic work-up per cardiology team  - Discussed EP study and AT ablation and patient is consented. The risk and benefits for each procedure were explain in detail which included but not limited to bleeding, infection, stroke, cardiac tamponade and death. Patient expressed understanding an all questions were answered    Gary Hunt PA-C

## 2020-10-29 NOTE — H&P PST ADULT - ABILITY TO HEAR (WITH HEARING AID OR HEARING APPLIANCE IF NORMALLY USED):
Subjective


Subjective:: 





Patient admitted with severe hyponatremia, given demeclocycline and salt tablets

in ICU, sodium with very slow gradual improvement initially then sodium dropped 

off again after he was given a bolus of IV fluids for unknown reason overnight 

9/2.  Nephrology consulted and the case was discussed with him in detail.  

Patient given oral Lasix 3 times daily as well as sodium tablets 3 times daily. 

He has rather severe acute on chronic bilateral lower extremity edema.  He has 

known chronic cirrhosis with elevated bilirubin and ammonia currently on 

rifaximin and lactulose has been stopped due to diarrhea.  Hemoglobin and WBC 

are lower.  Sodium down to 122.7.  And creatinine is the same as yesterday at 

1.68.  Patient may benefit from urea or tolvaptan, defer to nephrology.


Reason For Visit: 


HYPERKALEMIA,HYPONATREMIA








Physical Exam


Vital Signs: 


                                        











Temp Pulse Resp BP Pulse Ox


 


 97.3 F   66   22 H  127/54 H  94 


 


 09/03/20 16:30  09/03/20 16:30  09/03/20 16:30  09/03/20 16:30  09/03/20 16:30








                                 Intake & Output











 09/02/20 09/03/20 09/04/20





 06:59 06:59 06:59


 


Intake Total 2219 3463 


 


Output Total 3750 2200 


 


Balance -1531 1263 


 


Weight 137.9 kg 146.3 kg 











General appearance: PRESENT: no acute distress, morbidly obese, well-developed, 

well-nourished


Head exam: PRESENT: atraumatic, normocephalic


Eye exam: PRESENT: conjunctiva pink, other - Prosthetic right eye


Mouth exam: PRESENT: moist


Respiratory exam: PRESENT: clear to auscultation karma.  ABSENT: rales, rhonchi, 

wheezes


Cardiovascular exam: PRESENT: RRR.  ABSENT: diastolic murmur, rubs, systolic 

murmur


GI/Abdominal exam: PRESENT: normal bowel sounds, soft.  ABSENT: distended, 

guarding, mass, organolmegaly, rebound, tenderness


Extremities exam: PRESENT: pedal edema, +2 edema


Neurological exam: PRESENT: alert, awake, oriented to person, oriented to place,

oriented to time, oriented to situation


Psychiatric exam: PRESENT: appropriate affect, normal mood


Skin exam: PRESENT: dry, intact, warm





Results


Laboratory Results: 


                                        





                                 09/03/20 05:59 





                                 09/03/20 05:59 





                                        











  09/03/20 09/03/20 09/03/20





  05:59 05:59 10:35


 


WBC  9.2  


 


RBC  2.40 L  


 


Hgb  8.4 L  


 


Hct  23.4 L  


 


MCV  98 H  


 


MCH  34.9 H  


 


MCHC  35.8  


 


RDW  15.9 H  


 


Plt Count  51 L  


 


Seg Neutrophils %  Not Reportable  


 


Retic Count (auto)    2.71


 


Sodium   122.7 L 


 


Potassium   4.6 


 


Chloride   90 L 


 


Carbon Dioxide   24 


 


Anion Gap   9 


 


BUN   36 H 


 


Creatinine   1.68 H 


 


Est GFR ( Amer)   51 L 


 


Glucose   104 


 


Calcium   8.0 L 


 


Magnesium   1.8 


 


Iron   


 


TIBC   


 


Ferritin   


 


Total Bilirubin   1.8 H 


 


AST   80 H 


 


Alkaline Phosphatase   117 


 


Ammonia   


 


Total Protein   5.6 L 


 


Albumin   2.7 L 


 


Vitamin B12   


 


Folate   














  09/03/20 09/03/20





  10:35 10:35


 


WBC  


 


RBC  


 


Hgb  


 


Hct  


 


MCV  


 


MCH  


 


MCHC  


 


RDW  


 


Plt Count  


 


Seg Neutrophils %  


 


Retic Count (auto)  


 


Sodium  


 


Potassium  


 


Chloride  


 


Carbon Dioxide  


 


Anion Gap  


 


BUN  


 


Creatinine  


 


Est GFR (African Amer)  


 


Glucose  


 


Calcium  


 


Magnesium  


 


Iron  < 10.1 L 


 


TIBC  254 


 


Ferritin  128.00 


 


Total Bilirubin  


 


AST  


 


Alkaline Phosphatase  


 


Ammonia   52.5 H


 


Total Protein  


 


Albumin  


 


Vitamin B12  > 1000.0 H 


 


Folate  18.10 








                                        











  08/30/20 08/30/20 08/30/20





  00:23 00:23 03:37


 


Creatine Kinase  195 H   184 H


 


CK-MB (CK-2)   4.09 


 


Troponin I   < 0.012 


 


NT-Pro-B Natriuret Pep   














  08/31/20





  06:05


 


Creatine Kinase 


 


CK-MB (CK-2) 


 


Troponin I 


 


NT-Pro-B Natriuret Pep  1540 H











Impressions: 


                                        





Lumbar Spine X-Ray  08/30/20 00:05


IMPRESSION:


No acute bony injury is identified.


 








Chest X-Ray  09/02/20 00:00


IMPRESSION:  Cardiomegaly and low inspiratory lung volumes without a 

superimposed acute cardiopulmonary process.


 














Assessment and Plan





- Diagnosis


(1) Hyperammonemia


Is this a current diagnosis for this admission?: Yes   


Plan: 





-Continue rifaximin


Lactulose being held for diarrhea.








(2) Chronic liver disease and cirrhosis


Is this a current diagnosis for this admission?: Yes   





(3) Anemia


Qualifiers: 


   Anemia type: iron deficiency   Iron deficiency anemia type: unspecified iron 

deficiency   Qualified Code(s): D50.9 - Iron deficiency anemia, unspecified   


Is this a current diagnosis for this admission?: Yes   


Plan: 





Labs consistent with iron deficiency anemia


Given Venofer, can take daily oral iron provided he is not getting constipated


Trend CBC


No signs of blood loss








(4) Hyperkalemia


Is this a current diagnosis for this admission?: Yes   


Plan: 





Resolved








(5) Hyponatremia


Is this a current diagnosis for this admission?: Yes   


Plan: 


Per previous physician:


"This is a chronic issue for this patient.  Sodium is up to 126 today.  Continue

to monitor sodium levels.


Consider discontinuing demeclocycline and adding a fluid restriction."





9/3/2020


Nephrology consulted, discussed the case with them in detail


Stopped demeclocycline as this is likely ineffective and puts patient at risk 

for side effects and drug interactions


Increased sodium tablet frequency to 3 times daily, continue Lasix


Consider tolvaptan or urea treatment








(6) Leucocytosis


Qualifiers: 


   Leukocytosis type: unspecified   Qualified Code(s): D72.829 - Elevated white 

blood cell count, unspecified   


Is this a current diagnosis for this admission?: Yes   


Plan: 


Resolved








(7) Acute on chronic diastolic CHF (congestive heart failure)


Is this a current diagnosis for this admission?: Yes   


Plan: 





TTE done this admission showed EF 60 to 65% with grade 2 diastolic dysfunction 

and moderate pulmonary hypertension with RVSP 50 to 55 mmHg


After adequate volume control with diuretics, may benefit from repeat echocardi

ogram to rule out other forms of pulmonary hypertension could be underlying, 

could benefit from RHC in the future








- Time


Time Spent with patient: 25-34 minutes


Medications reviewed and adjusted accordingly: Yes


Anticipated Discharge Disposition: Skilled Nursing Facility


Anticipated Discharge Timeframe: within 72 hours





- Inpatient Certification


Based on my medical assessment, after consideration of the patient's 

comorbidities, presenting symptoms, or acuity I expect that the services needed 

warrant INPATIENT care.: Yes


I certify that my determination is in accordance with my understanding of 

Medicare's requirements for reasonable and necessary INPATIENT services [42 CFR 

412.3e].: Yes


Medical Necessity: Significant Comorbidiites Make Outpatient Treatment Too 

Risky, Need Close Monitoring Due to Risk of Patient Decompensation, Risk of 

Complication if Not Cared For in Hospital, Risk of Diagnosis Which Will Require 

Inpatient Eval/Care/Monitoring Adequate: hears normal conversation without difficulty

## 2020-10-29 NOTE — CONSULT NOTE ADULT - ATTENDING COMMENTS
65 year old woman with a pmhx of failed AT s/p ablation (2015), orthostatic HTN, STYLES, non-obstructive CAD, PVCs, renal donor S/p left Nephrectomy (40 yrs ago) who present to pre surgical test for clearance for complex ablation on 11/3/2020 and was found to be SOB at rest with palpitation and TWI. Patient was BIBEMS from [facility] to [facility] ED for further ischemic workup and AT Ablation. Cont tele monitoring. TTE, potential ishemic eval. Will follow.

## 2020-10-29 NOTE — H&P PST ADULT - NSICDXPASTMEDICALHX_GEN_ALL_CORE_FT
PAST MEDICAL HISTORY:  HTN (hypertension)     Single kidney      PAST MEDICAL HISTORY:  Single kidney      PAST MEDICAL HISTORY:  Atrial tachycardia     History of orthostatic hypotension     Single kidney     Ventricular septal defect

## 2020-10-29 NOTE — H&P ADULT - PROBLEM SELECTOR PLAN 1
SOB for 6 days in the setting of frequent more severe palpitations. Pt associates SOB with palpitations episodes and now severely limits ET. However, probnp also elevated to 2202 and noted to have intermittent LE edema the last couple of days? but not quite significantly overloaded on exam. Lungs are clear on exam, not hypoxic, no tachypnea. Also lied patient down flat and without worsened SOB. S/p lasix 20 IV given in ED. Possible new onset CHF (elevated probnp, reported SOB) though does not appear decompensated at this time. SOB also seems to be more associated with palpitation episodes  EKG NSR with TWI in V3-V6, AVF, III. Trop 27--26  While interviewing patient had run of possible Atach? on tele  -obtain TTE (last echo 3/2019 with normal EF)  -hold off on further lasix for now as clinically appears well, pending TTE. Can likely give lasix 20mg IV qd if confirmed CHF, LE swelling increases, SOB. Note patient has orthostatic hypotension chronically and would be careful not to overdiurese as she is diuretic naive  -EP consult appreciated and plan for ablation  -repeat BMP/Mg in AM and replete lytes as needed  -likely will require ischemic eval such as stress test potentially after ablation SOB for 6 days (starting prior to [place] trip) in the setting of more frequent and more severe palpitations. Pt associates SOB with palpitations episodes and now severely limits ET. However, probnp also elevated to 2202 and noted to have intermittent LE edema the last couple of days? but not quite significantly overloaded on exam. Lungs are clear on exam, not hypoxic, no tachypnea. Also lied patient down flat and without worsened SOB. S/p lasix 20 IV given in ED. Possible new onset CHF (elevated probnp, reported SOB) though does not appear decompensated at this time. SOB also seems to be more associated with palpitation episodes  EKG NSR with TWI in V3-V6, AVF, III. Trop 27--26  While interviewing patient had run of possible Atach? on tele  -upon reevaluation later in evening, patient with episode of palpitations/SOB that resolved but with hypoxia to 92-93% consistently with occasional increase to 94-95%. Denies SOB with this hypoxia and it had resolved with episode of palpitations. Denies CP. At this point would r/o PE given recent travel hx to [place], prior CP (3 days ago) and hypoxia? at this time. CTA chest ordered  -obtain TTE (last echo 3/2019 with normal EF)  -hold off on further lasix for now as clinically appears well, pending TTE. Can likely give lasix 20mg IV qd if confirmed CHF, LE swelling increases, SOB. Note patient has orthostatic hypotension chronically and would be careful not to overdiurese as she is diuretic naive  -EP consult appreciated and plan for ablation  -repeat BMP/Mg in AM and replete lytes as needed  -likely will require ischemic eval such as stress test potentially after ablation

## 2020-10-29 NOTE — ED PROVIDER NOTE - NS ED ROS FT
CONSTITUTIONAL: No fevers, no chills, no lightheadedness, no dizziness  EYES: no visual changes, no eye pain  EARS: no ear drainage, no ear pain, no change in hearing  NOSE: no nasal congestion  MOUTH/THROAT: no sore throat  CV: + chest pain, + palpitations  RESP: + SOB, no cough  GI: No n/v/d, no abd pain  : no dysuria, no hematuria, no flank pain  MSK: no back pain, no extremity pain  SKIN: no rashes  NEURO: no headache, no focal weakness, no decreased sensation/paresthesias   PSYCHIATRIC: no known mental health issues

## 2020-10-29 NOTE — ED PROVIDER NOTE - CLINICAL SUMMARY MEDICAL DECISION MAKING FREE TEXT BOX
65y F w/ PMHx orthostatic htn, renal donor, s/p left nephrectomy, VSD, recurring palpitations, failed attempt of AT ablation 2015 sent in from MD office for worsening palpitations and sob on exertion x 10 days.

## 2020-10-29 NOTE — H&P ADULT - HISTORY OF PRESENT ILLNESS
65F with PMHx AT s/p failed ablation (not performed in 2015), positive tilt table/orthostatic hypotension, VSD, non-obstructive CAD, PVCs, chronic palpitations, renal donor (s/p L nephrectomy for son) presenting from PST for palpitations and SOB at rest. Pt initially started having palpitations 5 years ago but was unable to have ablation then. Since patient has on and off palpitations but has been able to perform ADLs. On 10/9 seen by Dr. Quijano and noted to have runs of Atach on holter monitor and plan was for EP study and ablation. Today patient presented to [facility] and noted to have palpitations, SOB at rest over last 6 days and TWI on EKG done. Sent to ED. Per patient she recently visited the [place] and had mild SOB prior to this trip 6 days ago which worsened during her stay. ET only about 10 steps but formerly multiple blocks. Denies orthopnea to me (endorsed to other provider). She states SOB is specifically when she gets palpitations which have started to have more in frequency and severity. At this point it is limiting her ADLs. Of note she also had CP left sided, nonradiating 3 days ago occurring overnight. This self resolved on its own and she hasn't had since. Denies hx prior CHF. Endorses dysuria 5 days ago now resolved, but denies fevers, chills, polyuria, foul smelling urine. Also endorses leg swelling b/l but now improved today. She only noticed this yesterday.

## 2020-10-29 NOTE — ED PROVIDER NOTE - OBJECTIVE STATEMENT
65y F w/ PMHx orthostatic htn, renal donor, s/p left nephrectomy, VSD, recurring palpitations, failed attempt of AT ablation 2015 sent in from MD office for worsening palpitations and sob on exertion x 10 days. Patient states she has ceased taking her metoprolol dose 10 days ago for preparation of her scheduled ablation. Also endorses worsening SOB over past two, states she can not even walk one block with becoming severely short of breath, having to stop and rest. Patient also states her palpitations are worsened with ambulation. States she returned from [place] yesterday, while in [place], endorses two days on non-radiating L-sided chest pain 3 days ago, described as "sharp" with no associated diaphoresis, nausea, vomiting. Denies previous hx of similar chest pain. States her last stress test (exercise) was done in 2015. Follows with Dr. Samson Queen (Cardiology). Denies ha, visual changes, dizziness, cough, focal weakness or numbness, syncopal episodes.

## 2020-10-29 NOTE — ED PROVIDER NOTE - PHYSICAL EXAMINATION
Physical Exam:  Gen: NAD, AOx3, non-toxic appearing, able to ambulate without assistance  HEENT: PEERL, oral mucosa moist  Lung: CTAB, no respiratory distress, no wheezes/rhonchi/rales B/L, speaking in full sentences  CV: RRR, no murmurs, rubs or gallops  Abd: soft, NT, ND, no guarding, no rigidity, no rebound tenderness, no CVA tenderness   MSK: no visible deformities, ROM normal in UE/LE, no back pain  Neuro: No focal sensory or motor deficits  Skin: Warm, well perfused, no rash, 1+pitting edema in B/L LE  Psych: normal affect, calm  Pastora Rand D.O. -Resident

## 2020-10-29 NOTE — H&P ADULT - PROBLEM SELECTOR PLAN 2
Was planned for ablation on 11/3 and instructed to hold metoprolol 25mg qhs for ten days prior  -f/u EP recs  -per EP will have ablation  -hold BB for now  -monitor on tele

## 2020-10-29 NOTE — ED PROVIDER NOTE - PROGRESS NOTE DETAILS
Giorgi CLEMONS (PGY-2): Pitting edema, pro-BNP >2000 c/f new onset CHF, discussed with tele doc, will give IV lasix 20mg as pt is lasix naive, tba to telemetry

## 2020-10-29 NOTE — H&P PST ADULT - NEGATIVE GENERAL GENITOURINARY SYMPTOMS
no dysuria/normal urinary frequency/no bladder infections/no hematuria/no flank pain L/no flank pain R

## 2020-10-29 NOTE — H&P PST ADULT - NEGATIVE GENERAL SYMPTOMS
no weight gain/no sweating/no anorexia/no weight loss/no polyphagia/no chills/no polyuria/no malaise/no fatigue/no fever/no polydipsia

## 2020-10-29 NOTE — H&P PST ADULT - RS GEN PE MLT RESP DETAILS PC
no rhonchi/breath sounds equal/good air movement/no rales/no chest wall tenderness/no wheezes/no intercostal retractions/clear to auscultation bilaterally

## 2020-10-29 NOTE — ED PROVIDER NOTE - ATTENDING CONTRIBUTION TO CARE
I have personally performed a face to face bedside history and physical examination of this patient. I have discussed the history, examination, review of systems, assessment and plan of management with the resident. I have reviewed the electronic medical record and amended it to reflect my history, review of systems, physical exam, assessment and plan.    Brief HPI:  65y F w/ PMHx orthostatic htn, renal donor, s/p left nephrectomy, VSD, recurring palpitations, failed attempt of AT ablation 2015 sent in from MD office for worsening palpitations and sob on exertion x 10 days.      Vitals:   Reviewed    Exam:    GEN:  Non-toxic appearing, non-distressed, speaking full sentences, non-diaphoretic, AAOx3  HEENT:  NCAT, neck supple, EOMI, PERRLA, sclera anicteric, no conjunctival pallor or injection, no stridor, normal voice, no tonsillar exudate, uvula midline, tympanic membranes and external auditory canals normal appearing bilaterally   CV:  regular rhythm and rate, s1/s2 audible, no murmurs, rubs or gallops, peripheral pulses 2+ and symmetric  PULM:  non-labored respirations, lungs clear to auscultation bilaterally, no wheezes, crackles or rales  ABD:  non distended, non-tender, no rebound, no guarding, negative Casey's sign, bowel sounds normal, no cvat  MSK:  no gross deformity, non-tender extremities and joints, range of motion grossly normal appearing, b/l le extremity edema, extremities warm and well perfused   NEURO:  AAOx3, CN II-XII intact, motor 5/5 in upper and lower extremities bilaterally, sensation grossly intact in extremities and trunk, finger to nose testing wnl, no nystagmus, negative Romberg, no pronator drift, no gait deficit  SKIN:  warm, dry, no rash or vesicles     A/P:  65y F w/ PMHx orthostatic htn, renal donor, s/p left nephrectomy, VSD, recurring palpitations, failed attempt of AT ablation 2015 sent in from MD office for worsening palpitations and sob on exertion x 10 days.  VSS.  AF.  Ekg NSR without concerning ischemic changes.  Possible heart failure given reported sob and le edema.  Low c/f pe.  Low c/f acs.  Will send labs, cxr, supportive care.  Dispo pending.

## 2020-10-29 NOTE — ED ADULT NURSE NOTE - OBJECTIVE STATEMENT
patient alert to name date and situation chief complaint heart palpitation starting today previous cardiac arrythmia history scheduled for ablation next month by Dr [facility]. patient place don cardiac monitor sinus rhythm with frequent PVCs. patine states SOB while laying flay, sleeps with head elevated. noticed increased leg swelling, non pitting edema. breathing even unlabored at rest. Dr Gonzalez from E.P. at bedside consented patient for procedure during admission. Safety education reinforced with call bell within reach. Verbalizes understanding of use. Will continue to monitor.

## 2020-10-29 NOTE — H&P ADULT - NSHPPHYSICALEXAM_GEN_ALL_CORE
PHYSICAL EXAM:  Vital Signs Last 24 Hrs  T(C): 36.6 (10-29-20 @ 15:47)  T(F): 97.9 (10-29-20 @ 15:47), Max: 97.9 (10-29-20 @ 15:47)  HR: 81 (10-29-20 @ 15:47) (80 - 81)  BP: 129/85 (10-29-20 @ 15:47)  BP(mean): 105 (10-29-20 @ 14:27) (105 - 105)  RR: 16 (10-29-20 @ 15:47) (16 - 26)  SpO2: 95% (10-29-20 @ 15:47) (95% - 97%)  Wt(kg): --    Constitutional: NAD, awake and alert, well developed  EYES: EOMI, conjunctiva clear  ENT:  Normal Hearing, no tonsillar exudates   Neck: Soft and supple , no thyromegaly   Respiratory: Breath sounds are clear bilaterally, No wheezing, rales or rhonchi, no tachypnea, no accessory muscle use  Cardiovascular: S1 and S2, regular rate and rhythm, no Murmurs, gallops or rubs, no JVD, no leg edema  Gastrointestinal: Bowel Sounds present, soft, nontender, nondistended, no guarding, no rebound  Extremities: No cyanosis or clubbing; warm to touch, + right calf tenderness  Vascular: 2+ peripheral pulses lower ex  Neurological: No focal deficits, CN II-XII intact bilaterally, sensation to light touch intact in all extremities. Pupils are equally reactive to light and symmetrical in size.   Musculoskeletal: 5/5 strength b/l upper and lower extremities; no joint swelling.  Skin: No rashes, no ulcerations

## 2020-10-29 NOTE — CONSULT NOTE ADULT - SUBJECTIVE AND OBJECTIVE BOX
Source: patient and Chart    HPI:  This is a 65 year old woman with a pmhx of AT s/p ablation (2015), orthostatic HTN, STYLES, non-obstructive CAD, PVCs, chronic palpitation, renal donor to her son when he was 13 year old s/p left Nephrectomy (40 yrs ago) who present to pre surgical test for clearance for complex ablation on 11/3/2020 and was found to be SOB at rest with palpitation and TWI. Patient was BIBEMS from [facility] to [facility] ED for further workup.     Patient was SOB at rest over the past 6 days and noticed that her chronic palpitation has worsening. She stated that she can only walk 10 step before becoming extremely SOB. According to the patient, at baseline she was able to walk 4-5 blocks without STYLES. She admitted to non-radiating left side stabbing CP 2 night ago. She rated the intensity as 9/10. She also noticed lower extremities edema, abdominal fullness, orthopnea, PND, dry cough, and burning with urination which self-resolved 5 days ago. Patient denied fever, chills, HA, lightheadedness, dizziness, changes in visions, sore throat, running nose, abdominal pain, n/v/d, incontinence and changes of bowel movement, numbness, and tingling.     Patient ED Course was significant for T 97.9F HR 81, /85, RR 16 with a Spo2 of 100% on RA. Labs, EKG, and CXR pending. Patient was admitted to telemetric floor     Upon my evaluation, patient was Sinus Rhythm with peek P wave possible right atrial enlargement. Patient admitted to SOB. Patient denied HA, lightheadedness, dizziness, pre-syncope, syncope, cold like symptoms, CP, palpitation, abdominal pain, n/v/d and numbness and tingling. Discussed AT ablation and patient is consented. The risk and benefits for each procedure were explain in detail which included but not limited to bleeding, infection, stroke, cardiac tamponade and death. Patient expressed understanding an all questions were answered.    PAST MEDICAL & SURGICAL HISTORY:  Atrial tachycardia  Ventricular septal defect  History of orthostatic hypotension  Single kidney  S/p nephrectomy donated kidney- left 1984    MEDICATIONS  (STANDING):    MEDICATIONS  (PRN):    Family;  Father: Prostate Ca  Aunt: T2DM    Social situation:  Lives with alone and at baseline was fully independent but for the past week need help from her son  Rarely drinks alcohol, once every 2-3 months  Denied smoking  Denied illicit drug uses    REVIEW OF SYSTEMS:  CONSTITUTIONAL: No fever, weight loss, chills, shakes, or fatigue  EYES: No eye pain, visual disturbances, or discharge  ENMT:  No difficulty hearing, tinnitus, vertigo; No sinus or throat pain  RESPIRATORY: per HPI  CARDIOVASCULAR: per HPI  GASTROINTESTINAL: No abdominal or epigastric pain, nausea, vomiting, hematemesis, diarrhea, constipation, melena or bright red blood.  GENITOURINARY: No dysuria, nocturia, hematuria, or urinary incontinence, Admitted to burning with urination   NEUROLOGICAL: No headaches, memory loss, slurred speech, limb weakness, loss of strength, numbness, or tremors  MUSCULOSKELETAL: No joint pain, muscle, back, or extremity pain    Vital Signs Last 24 Hrs  T(C): 36.6 (29 Oct 2020 15:47), Max: 36.6 (29 Oct 2020 15:47)  T(F): 97.9 (29 Oct 2020 15:47), Max: 97.9 (29 Oct 2020 15:47)  HR: 81 (29 Oct 2020 15:47) (80 - 81)  BP: 129/85 (29 Oct 2020 15:47) (129/85 - 149/83)  BP(mean): 105 (29 Oct 2020 14:27) (105 - 105)  RR: 16 (29 Oct 2020 15:47) (16 - 26)  SpO2: 95% (29 Oct 2020 15:47) (95% - 97%)    PHYSICAL EXAM:  GENERAL: Well appearing, speaking in full sentence, in NAD  HEART: S1S2 RRR; No murmurs, rubs, or gallops appreciated.  PULMONARY: Crackles b/l, normal respiratory effort.  No rales, wheezing, or rhonchi appreciated bilaterally  ABDOMEN: Bowel sounds present, soft, NDNT  EXTREMITIES:  Warm, well -perfused, trace lower extremities edema, distal pulses present  NEUROLOGICAL:AOx3     INTERPRETATION OF TELEMETRY: SR with peek P wave possible right atrial enlargement     ECG: pending     I&O's Detail      LABS:pending    BNP  I&O's Detail    Daily Height in cm: 170.18 (29 Oct 2020 15:47)    Daily     RADIOLOGY & ADDITIONAL STUDIES:  CXR: pending  TTE: pending    TTE 3/6/2019  DIMENSIONS:  Dimensions:     Normal Values:  LA:     3.9 cm    2.0 - 4.0 cm  Ao:     3.9 cm    2.0 - 3.8 cm  SEPTUM:0.9 cm    0.6 - 1.2 cm  PWT:    0.9 cm    0.6 - 1.1 cm  LVIDd:  4.6 cm    3.0 - 5.6 cm  LVIDs:  3.0 cm    1.8 - 4.0 cm    Derived Variables:  LVMI: 70 g/m2  RWT: 0.39  Ejection Fraction Visual Estimate: 55-60 %  ------------------------------------------------------------------------  OBSERVATIONS:  Mitral Valve: Normal mitral valve. Mild mitral  regurgitation.  Aortic Root: Aortic Root: 3.9 cm.spersad    Aortic Valve: Normal trileaflet aortic valve. Mild aortic  insufficiency.  Left Atrium: Normal left atrium.  LA volume index = 28  cc/m2.  Left Ventricle: Normal Left Ventricular Systolic Function,  (EF = 55 to 60%) Normal left ventricular internal  dimensions and wall thicknesses. Grade II diastolic  dysfunction.  Right Heart: Normal right atrium. Normal right ventricular  size and systolic function (TAPSE  2.3cm). There is mild  tricuspid regurgitation. Normal pulmonic valve.  Pericardium/PleuraNormal pericardium with no pericardial  effusion.  Hemodynamic: RA Pressure is 8 mm Hg. RV systolic pressure  is 37 mm Hg.  ------------------------------------------------------------------------  CONCLUSIONS:  1. Normal mitral valve. Mild mitral regurgitation.  2. Normal trileaflet aortic valve. Mild aortic  insufficiency.  3. Aortic Root: 3.9 cm.  4. Normal left atrium.  LA volume index = 28 cc/m2.  5. Normal left ventricular internal dimensions and wall  thicknesses.  6. Normal Left Ventricular Systolic Function,  (EF = 55 to  60%)  7. Grade II diastolic dysfunction.  8. Normal right atrium.  9. Normal right ventricular size and systolic function  (TAPSE  2.3cm).  10. RA Pressure is 8 mm Hg.  11. RV systolic pressure is 37 mm Hg.  12. There is mild tricuspid regurgitation.  13. Normal pulmonic valve.  14. Normal pericardium with no pericardial effusion.    ------------------------------------------------------------------------  Confirmed on  3/6/2019 - 07:35:47 by Kim Valera MD    < from: MR Cardiac Velocity Flow Map (09.23.20 @ 14:39) >  LVEDV: 115 ml  LVESV: 48 ml  LVSV: 67 ml  LVEF: 58 % (normal > 56%)    Cardiac output: 3.6 L/min  Cardiac index: 1.7 L/min/m2  LV mass: 81 g      LV measurements: (I=Indexed to BSA)  LVEDVI: 56 ml/m2 (normal < 95)  LVESVI: 23 ml/m2  LVSVI: 33 ml/m2  LV mass indexed: 40 g/m2 (normal < 80)      LV DANIEL: 53 mm (normal < 60)  Anterior septal wall: 7 mm  Posterior lateral wall: 6 mm      LV ESD: 33 mm    Left atrium:  The left atrium is mildly enlarged in size.  Biplane ES volume = 61 mL (normal <55)  ES LA Volume / BSA = 30 mL/m^2 (normal <30)    Right Ventricle:  There is normal RV size and normal global systolic function. There is no fatty infiltration of the RV wall. No delayed enhancement is seen within the right ventricular myocardium. There are no regional wall motion abnormalities or focal aneurysmal motion.    Right atrium:  The right atrium is enlarged in size.    Aortic valve:  Quantification was not performed; however, qualitatively there are no abnormal flow jets to suggest aortic stenosis or regurgitation.    Pulmonic valve: Quantification was not performed; however, qualitatively there are no abnormal flow jets to suggest aortic stenosis or regurgitation    Mitral valve:  No significant mitral stenosis or regurgitation.    Tricuspid valve:  No significant tricuspid stenosis or regurgitation.    Flow measurements:  No ASD or VSD. Qp/Qs measurement is 1.0.    Pericardium:  No pericardial effusion. No pericardial thickening.    Extracardiac findings:  The chest wall and mediastinum are unremarkable. No pleural effusions.  Limited evaluation of the lungs demonstrate no abnormal signal characteristics. 2.2 cm hepatic cyst.    IMPRESSION:  1.  Normal left ventricular size and systolic function. No myocardial edema. No delayed enhancement was visualized in the left ventricular myocardium. Mild left atrial enlargement.  2.  Mild right atrial enlargement. Normal right ventricular size. No delayed enhancement or fatty infiltration was visualized in the right ventricular myocardium.  3.  No ASD or VSD. Qp/Qs measurement is 1.0.  4. No valvular abnormalities.       Source: patient and Chart    HPI:  This is a 65 year old woman with a pmhx of AT s/p failed ablation (2015), orthostatic HTN, STYLES, non-obstructive CAD, PVCs, chronic palpitation, renal donor to her son when he was 13 year old s/p left Nephrectomy (40 yrs ago) who present to pre surgical test for clearance for complex ablation on 11/3/2020 and was found to be SOB at rest with palpitation and TWI. Patient was BIBEMS from [facility] to [facility] ED for further workup.     Patient was SOB at rest over the past 6 days and noticed that her chronic palpitation has worsening. She stated that she can only walk 10 step before becoming extremely SOB. According to the patient, at baseline she was able to walk 4-5 blocks without STYLES. She admitted to non-radiating left side stabbing CP 2 night ago. She rated the intensity as 9/10. She also noticed lower extremities edema, abdominal fullness, orthopnea, PND, dry cough, and burning with urination which self-resolved 5 days ago. Patient denied fever, chills, HA, lightheadedness, dizziness, changes in visions, sore throat, running nose, abdominal pain, n/v/d, incontinence and changes of bowel movement, numbness, and tingling.     Patient ED Course was significant for T 97.9F HR 81, /85, RR 16 with a Spo2 of 100% on RA. Labs, EKG, and CXR pending. Patient was admitted to telemetric floor     Upon my evaluation, patient was Sinus Rhythm with peek P wave possible right atrial enlargement. Patient admitted to SOB. Patient denied HA, lightheadedness, dizziness, pre-syncope, syncope, cold like symptoms, CP, palpitation, abdominal pain, n/v/d and numbness and tingling. Discussed AT ablation and patient is consented. The risk and benefits for each procedure were explain in detail which included but not limited to bleeding, infection, stroke, cardiac tamponade and death. Patient expressed understanding an all questions were answered.    PAST MEDICAL & SURGICAL HISTORY:  Atrial tachycardia  Ventricular septal defect  History of orthostatic hypotension  Single kidney  S/p nephrectomy donated kidney- left 1984    MEDICATIONS  (STANDING):    MEDICATIONS  (PRN):    Family;  Father: Prostate Ca  Aunt: T2DM    Social situation:  Lives with alone and at baseline was fully independent but for the past week need help from her son  Rarely drinks alcohol, once every 2-3 months  Denied smoking  Denied illicit drug uses    REVIEW OF SYSTEMS:  CONSTITUTIONAL: No fever, weight loss, chills, shakes, or fatigue  EYES: No eye pain, visual disturbances, or discharge  ENMT:  No difficulty hearing, tinnitus, vertigo; No sinus or throat pain  RESPIRATORY: per HPI  CARDIOVASCULAR: per HPI  GASTROINTESTINAL: No abdominal or epigastric pain, nausea, vomiting, hematemesis, diarrhea, constipation, melena or bright red blood.  GENITOURINARY: No dysuria, nocturia, hematuria, or urinary incontinence, Admitted to burning with urination   NEUROLOGICAL: No headaches, memory loss, slurred speech, limb weakness, loss of strength, numbness, or tremors  MUSCULOSKELETAL: No joint pain, muscle, back, or extremity pain    Vital Signs Last 24 Hrs  T(C): 36.6 (29 Oct 2020 15:47), Max: 36.6 (29 Oct 2020 15:47)  T(F): 97.9 (29 Oct 2020 15:47), Max: 97.9 (29 Oct 2020 15:47)  HR: 81 (29 Oct 2020 15:47) (80 - 81)  BP: 129/85 (29 Oct 2020 15:47) (129/85 - 149/83)  BP(mean): 105 (29 Oct 2020 14:27) (105 - 105)  RR: 16 (29 Oct 2020 15:47) (16 - 26)  SpO2: 95% (29 Oct 2020 15:47) (95% - 97%)    PHYSICAL EXAM:  GENERAL: Well appearing, speaking in full sentence, in NAD  HEART: S1S2 RRR; No murmurs, rubs, or gallops appreciated.  PULMONARY: Crackles b/l, normal respiratory effort.  No rales, wheezing, or rhonchi appreciated bilaterally  ABDOMEN: Bowel sounds present, soft, NDNT  EXTREMITIES:  Warm, well -perfused, trace lower extremities edema, distal pulses present  NEUROLOGICAL:AOx3     INTERPRETATION OF TELEMETRY: SR with peek P wave possible right atrial enlargement     ECG: pending     I&O's Detail      LABS:pending    BNP  I&O's Detail    Daily Height in cm: 170.18 (29 Oct 2020 15:47)    Daily     RADIOLOGY & ADDITIONAL STUDIES:  CXR: pending  TTE: pending    TTE 3/6/2019  DIMENSIONS:  Dimensions:     Normal Values:  LA:     3.9 cm    2.0 - 4.0 cm  Ao:     3.9 cm    2.0 - 3.8 cm  SEPTUM:0.9 cm    0.6 - 1.2 cm  PWT:    0.9 cm    0.6 - 1.1 cm  LVIDd:  4.6 cm    3.0 - 5.6 cm  LVIDs:  3.0 cm    1.8 - 4.0 cm    Derived Variables:  LVMI: 70 g/m2  RWT: 0.39  Ejection Fraction Visual Estimate: 55-60 %  ------------------------------------------------------------------------  OBSERVATIONS:  Mitral Valve: Normal mitral valve. Mild mitral  regurgitation.  Aortic Root: Aortic Root: 3.9 cm.spersad    Aortic Valve: Normal trileaflet aortic valve. Mild aortic  insufficiency.  Left Atrium: Normal left atrium.  LA volume index = 28  cc/m2.  Left Ventricle: Normal Left Ventricular Systolic Function,  (EF = 55 to 60%) Normal left ventricular internal  dimensions and wall thicknesses. Grade II diastolic  dysfunction.  Right Heart: Normal right atrium. Normal right ventricular  size and systolic function (TAPSE  2.3cm). There is mild  tricuspid regurgitation. Normal pulmonic valve.  Pericardium/PleuraNormal pericardium with no pericardial  effusion.  Hemodynamic: RA Pressure is 8 mm Hg. RV systolic pressure  is 37 mm Hg.  ------------------------------------------------------------------------  CONCLUSIONS:  1. Normal mitral valve. Mild mitral regurgitation.  2. Normal trileaflet aortic valve. Mild aortic  insufficiency.  3. Aortic Root: 3.9 cm.  4. Normal left atrium.  LA volume index = 28 cc/m2.  5. Normal left ventricular internal dimensions and wall  thicknesses.  6. Normal Left Ventricular Systolic Function,  (EF = 55 to  60%)  7. Grade II diastolic dysfunction.  8. Normal right atrium.  9. Normal right ventricular size and systolic function  (TAPSE  2.3cm).  10. RA Pressure is 8 mm Hg.  11. RV systolic pressure is 37 mm Hg.  12. There is mild tricuspid regurgitation.  13. Normal pulmonic valve.  14. Normal pericardium with no pericardial effusion.    ------------------------------------------------------------------------  Confirmed on  3/6/2019 - 07:35:47 by Kim Valera MD    < from: MR Cardiac Velocity Flow Map (09.23.20 @ 14:39) >  LVEDV: 115 ml  LVESV: 48 ml  LVSV: 67 ml  LVEF: 58 % (normal > 56%)    Cardiac output: 3.6 L/min  Cardiac index: 1.7 L/min/m2  LV mass: 81 g      LV measurements: (I=Indexed to BSA)  LVEDVI: 56 ml/m2 (normal < 95)  LVESVI: 23 ml/m2  LVSVI: 33 ml/m2  LV mass indexed: 40 g/m2 (normal < 80)      LV DANIEL: 53 mm (normal < 60)  Anterior septal wall: 7 mm  Posterior lateral wall: 6 mm      LV ESD: 33 mm    Left atrium:  The left atrium is mildly enlarged in size.  Biplane ES volume = 61 mL (normal <55)  ES LA Volume / BSA = 30 mL/m^2 (normal <30)    Right Ventricle:  There is normal RV size and normal global systolic function. There is no fatty infiltration of the RV wall. No delayed enhancement is seen within the right ventricular myocardium. There are no regional wall motion abnormalities or focal aneurysmal motion.    Right atrium:  The right atrium is enlarged in size.    Aortic valve:  Quantification was not performed; however, qualitatively there are no abnormal flow jets to suggest aortic stenosis or regurgitation.    Pulmonic valve: Quantification was not performed; however, qualitatively there are no abnormal flow jets to suggest aortic stenosis or regurgitation    Mitral valve:  No significant mitral stenosis or regurgitation.    Tricuspid valve:  No significant tricuspid stenosis or regurgitation.    Flow measurements:  No ASD or VSD. Qp/Qs measurement is 1.0.    Pericardium:  No pericardial effusion. No pericardial thickening.    Extracardiac findings:  The chest wall and mediastinum are unremarkable. No pleural effusions.  Limited evaluation of the lungs demonstrate no abnormal signal characteristics. 2.2 cm hepatic cyst.    IMPRESSION:  1.  Normal left ventricular size and systolic function. No myocardial edema. No delayed enhancement was visualized in the left ventricular myocardium. Mild left atrial enlargement.  2.  Mild right atrial enlargement. Normal right ventricular size. No delayed enhancement or fatty infiltration was visualized in the right ventricular myocardium.  3.  No ASD or VSD. Qp/Qs measurement is 1.0.  4. No valvular abnormalities.

## 2020-10-29 NOTE — H&P PST ADULT - NSICDXPROBLEM_GEN_ALL_CORE_FT
PROBLEM DIAGNOSES  Problem: H/O atrial tachycardia  Assessment and Plan: Pt scheduled for complex ablation on 11/3/2020.  labs done results pending, ekg done.  Preop covid instructions given.  Preop teaching done, pt able to verbalize understanding.   PT stopped metoprolol on 10/24/2020 as per eps  SOB at rest, ekg changes case discussed with Dr. Harrell and Carlos Alberto NP for cardiology instructed to sent pt to ER went via ambulance in stable condition.

## 2020-10-29 NOTE — H&P ADULT - NSHPLABSRESULTS_GEN_ALL_CORE
I have personally reviewed this patient's labs below:                        12.0   10.97 )-----------( 160      ( 29 Oct 2020 17:30 )             39.4     10-29-20 @ 17:30    142  |  107  |  22             --------------------------< 122<H>     4.8  |  22  | 1.11    eGFR AA: 60  eGFR N-AA: 52    Calcium: 9.3  Phosphorus: --  Magnesium: --    AST: 45<H>    ALT: 53<H>  AlkPhos: 81  Protein: 7.4  Albumin: 4.0  TBili: 0.6  D-Bili: --  10-29-20 @ 15:10    140  |  107  |  24<H>             --------------------------< 116<H>     4.7  |  21<L>  | 1.14    eGFR AA: 58  eGFR N-AA: 50    Calcium: 9.1  Phosphorus: --  Magnesium: --    AST: 51<H>    ALT: 55<H>  AlkPhos: 82  Protein: 7.4  Albumin: 3.9  TBili: 0.6  D-Bili: --    Troponin 27--26  TSH 1.06  Probnp 2202    I have personally reviewed this patient's EKG and my independent interpretation is NSR with TWI in V3-V6, III, AVF new from prior EKG 3/4/2019    I have personally reviewed this patient's CXR and my independent interpretation is clear lungs      Review and summation of old records:  TTE 3/2019  CONCLUSIONS:  1. Normal mitral valve. Mild mitral regurgitation.  2. Normal trileaflet aortic valve. Mild aortic  insufficiency.  3. Aortic Root: 3.9 cm.  4. Normal left atrium.  LA volume index = 28 cc/m2.  5. Normal left ventricular internal dimensions and wall  thicknesses.  6. Normal Left Ventricular Systolic Function,  (EF = 55 to  60%)  7. Grade II diastolic dysfunction.  8. Normal right atrium.  9. Normal right ventricular size and systolic function  (TAPSE  2.3cm).  10. RA Pressure is 8 mm Hg.  11. RV systolic pressure is 37 mm Hg.  12. There is mild tricuspid regurgitation.  13. Normal pulmonic valve.  14. Normal pericardium with no pericardial effusion.

## 2020-10-29 NOTE — H&P ADULT - ASSESSMENT
65F with PMHx AT s/p failed ablation (not performed in 2015), positive tilt table/orthostatic hypotension, VSD, non-obstructive CAD, PVCs, chronic palpitations, renal donor (s/p L nephrectomy for son) presenting from PST for palpitations and SOB at rest.

## 2020-10-29 NOTE — ED ADULT NURSE NOTE - CHPI ED NUR SYMPTOMS NEG
no diaphoresis/no chills/no dizziness/no fever/no nausea/no syncope/no back pain/no congestion/no vomiting

## 2020-10-29 NOTE — H&P PST ADULT - NSANTHOSAYNRD_GEN_A_CORE
No. SADIA screening performed.  STOP BANG Legend: 0-2 = LOW Risk; 3-4 = INTERMEDIATE Risk; 5-8 = HIGH Risk

## 2020-10-29 NOTE — H&P PST ADULT - GASTROINTESTINAL DETAILS
bowel sounds normal/no guarding/no bruit/no masses palpable/no rebound tenderness/no organomegaly/soft/nontender/no distention/no rigidity

## 2020-10-29 NOTE — H&P PST ADULT - NSICDXFAMILYHX_GEN_ALL_CORE_FT
FAMILY HISTORY:  Father  Still living? Unknown  Family history of prostate cancer, Age at diagnosis: Age Unknown

## 2020-10-29 NOTE — H&P ADULT - NSHPREVIEWOFSYSTEMS_GEN_ALL_CORE
ROS:    Constitutional: [ ] fevers [ ] chills   HEENT: [ ] dry eyes [ ] eye irritation [ ] postnasal drip [ ] nasal congestion  CV: [x ] chest pain [ ] orthopnea [ x] palpitations [ ] murmur  Resp: [ ] cough [x ] shortness of breath [x ] dyspnea   GI: [ ] nausea [ ] vomiting [ ] diarrhea [ ] constipation [ ] abd pain   : [x ] dysuria [ ] nocturia [ ] hematuria [ ] increased urinary frequency  Musculoskeletal: [ ] back pain [ ] myalgias [ ] arthralgias [ ] fracture  Skin: [ ] rash [ ] itch  Neurological: [ ] headache [ ] dizziness [ ] syncope [ ] weakness [ ] numbness  Psychiatric: [ ] anxiety [ ] depression  Endocrine: [ ] diabetes [ ] thyroid problem  Hematologic/Lymphatic: [ ] anemia [ ] bleeding problem  Allergic/Immunologic: [ ] itchy eyes [ ] nasal discharge [ ] hives [ ] angioedema  [x ] All other systems negative

## 2020-10-30 DIAGNOSIS — I26.99 OTHER PULMONARY EMBOLISM WITHOUT ACUTE COR PULMONALE: ICD-10-CM

## 2020-10-30 LAB
ALBUMIN SERPL ELPH-MCNC: 3.9 G/DL — SIGNIFICANT CHANGE UP (ref 3.3–5)
ALP SERPL-CCNC: 77 U/L — SIGNIFICANT CHANGE UP (ref 40–120)
ALT FLD-CCNC: 43 U/L — HIGH (ref 4–33)
ANION GAP SERPL CALC-SCNC: 13 MMO/L — SIGNIFICANT CHANGE UP (ref 7–14)
APPEARANCE UR: CLEAR — SIGNIFICANT CHANGE UP
APTT BLD: 159.3 SEC — CRITICAL HIGH (ref 27–36.3)
APTT BLD: 27.1 SEC — SIGNIFICANT CHANGE UP (ref 27–36.3)
APTT BLD: 75.4 SEC — HIGH (ref 27–36.3)
AST SERPL-CCNC: 26 U/L — SIGNIFICANT CHANGE UP (ref 4–32)
BACTERIA # UR AUTO: HIGH
BILIRUB SERPL-MCNC: 0.7 MG/DL — SIGNIFICANT CHANGE UP (ref 0.2–1.2)
BILIRUB UR-MCNC: NEGATIVE — SIGNIFICANT CHANGE UP
BLOOD UR QL VISUAL: NEGATIVE — SIGNIFICANT CHANGE UP
BUN SERPL-MCNC: 26 MG/DL — HIGH (ref 7–23)
CALCIUM SERPL-MCNC: 8.6 MG/DL — SIGNIFICANT CHANGE UP (ref 8.4–10.5)
CHLORIDE SERPL-SCNC: 105 MMOL/L — SIGNIFICANT CHANGE UP (ref 98–107)
CO2 SERPL-SCNC: 22 MMOL/L — SIGNIFICANT CHANGE UP (ref 22–31)
COLOR SPEC: SIGNIFICANT CHANGE UP
CREAT SERPL-MCNC: 1.17 MG/DL — SIGNIFICANT CHANGE UP (ref 0.5–1.3)
GLUCOSE SERPL-MCNC: 174 MG/DL — HIGH (ref 70–99)
GLUCOSE UR-MCNC: NEGATIVE — SIGNIFICANT CHANGE UP
HCT VFR BLD CALC: 37.3 % — SIGNIFICANT CHANGE UP (ref 34.5–45)
HGB BLD-MCNC: 11.9 G/DL — SIGNIFICANT CHANGE UP (ref 11.5–15.5)
HYALINE CASTS # UR AUTO: NEGATIVE — SIGNIFICANT CHANGE UP
KETONES UR-MCNC: NEGATIVE — SIGNIFICANT CHANGE UP
LEUKOCYTE ESTERASE UR-ACNC: SIGNIFICANT CHANGE UP
MAGNESIUM SERPL-MCNC: 2.1 MG/DL — SIGNIFICANT CHANGE UP (ref 1.6–2.6)
MCHC RBC-ENTMCNC: 26.7 PG — LOW (ref 27–34)
MCHC RBC-ENTMCNC: 31.9 % — LOW (ref 32–36)
MCV RBC AUTO: 83.6 FL — SIGNIFICANT CHANGE UP (ref 80–100)
NITRITE UR-MCNC: NEGATIVE — SIGNIFICANT CHANGE UP
NRBC # FLD: 0 K/UL — SIGNIFICANT CHANGE UP (ref 0–0)
PH UR: 5.5 — SIGNIFICANT CHANGE UP (ref 5–8)
PLATELET # BLD AUTO: 178 K/UL — SIGNIFICANT CHANGE UP (ref 150–400)
PMV BLD: 11.7 FL — SIGNIFICANT CHANGE UP (ref 7–13)
POTASSIUM SERPL-MCNC: 3.9 MMOL/L — SIGNIFICANT CHANGE UP (ref 3.5–5.3)
POTASSIUM SERPL-SCNC: 3.9 MMOL/L — SIGNIFICANT CHANGE UP (ref 3.5–5.3)
PROT SERPL-MCNC: 7 G/DL — SIGNIFICANT CHANGE UP (ref 6–8.3)
PROT UR-MCNC: NEGATIVE — SIGNIFICANT CHANGE UP
RBC # BLD: 4.46 M/UL — SIGNIFICANT CHANGE UP (ref 3.8–5.2)
RBC # FLD: 15.2 % — HIGH (ref 10.3–14.5)
RBC CASTS # UR COMP ASSIST: SIGNIFICANT CHANGE UP (ref 0–?)
SODIUM SERPL-SCNC: 140 MMOL/L — SIGNIFICANT CHANGE UP (ref 135–145)
SP GR SPEC: > 1.04 — HIGH (ref 1–1.04)
SQUAMOUS # UR AUTO: SIGNIFICANT CHANGE UP
UROBILINOGEN FLD QL: NORMAL — SIGNIFICANT CHANGE UP
WBC # BLD: 9.46 K/UL — SIGNIFICANT CHANGE UP (ref 3.8–10.5)
WBC # FLD AUTO: 9.46 K/UL — SIGNIFICANT CHANGE UP (ref 3.8–10.5)
WBC UR QL: HIGH (ref 0–?)

## 2020-10-30 PROCEDURE — 76705 ECHO EXAM OF ABDOMEN: CPT | Mod: 26

## 2020-10-30 PROCEDURE — 93308 TTE F-UP OR LMTD: CPT | Mod: 26

## 2020-10-30 PROCEDURE — 93306 TTE W/DOPPLER COMPLETE: CPT | Mod: 26

## 2020-10-30 PROCEDURE — 99291 CRITICAL CARE FIRST HOUR: CPT

## 2020-10-30 PROCEDURE — 99223 1ST HOSP IP/OBS HIGH 75: CPT | Mod: 25

## 2020-10-30 PROCEDURE — 99233 SBSQ HOSP IP/OBS HIGH 50: CPT

## 2020-10-30 PROCEDURE — 93010 ELECTROCARDIOGRAM REPORT: CPT

## 2020-10-30 PROCEDURE — 93970 EXTREMITY STUDY: CPT | Mod: 26

## 2020-10-30 RX ORDER — HEPARIN SODIUM 5000 [USP'U]/ML
3500 INJECTION INTRAVENOUS; SUBCUTANEOUS EVERY 6 HOURS
Refills: 0 | Status: DISCONTINUED | OUTPATIENT
Start: 2020-10-30 | End: 2020-11-03

## 2020-10-30 RX ORDER — INFLUENZA VIRUS VACCINE 15; 15; 15; 15 UG/.5ML; UG/.5ML; UG/.5ML; UG/.5ML
0.5 SUSPENSION INTRAMUSCULAR ONCE
Refills: 0 | Status: DISCONTINUED | OUTPATIENT
Start: 2020-10-30 | End: 2020-11-06

## 2020-10-30 RX ORDER — HEPARIN SODIUM 5000 [USP'U]/ML
7000 INJECTION INTRAVENOUS; SUBCUTANEOUS EVERY 6 HOURS
Refills: 0 | Status: DISCONTINUED | OUTPATIENT
Start: 2020-10-30 | End: 2020-11-03

## 2020-10-30 RX ORDER — ACETAMINOPHEN 500 MG
650 TABLET ORAL ONCE
Refills: 0 | Status: COMPLETED | OUTPATIENT
Start: 2020-10-30 | End: 2020-10-30

## 2020-10-30 RX ORDER — HEPARIN SODIUM 5000 [USP'U]/ML
7000 INJECTION INTRAVENOUS; SUBCUTANEOUS ONCE
Refills: 0 | Status: COMPLETED | OUTPATIENT
Start: 2020-10-30 | End: 2020-10-30

## 2020-10-30 RX ORDER — HEPARIN SODIUM 5000 [USP'U]/ML
INJECTION INTRAVENOUS; SUBCUTANEOUS
Qty: 25000 | Refills: 0 | Status: DISCONTINUED | OUTPATIENT
Start: 2020-10-30 | End: 2020-11-03

## 2020-10-30 RX ADMIN — HEPARIN SODIUM 1300 UNIT(S)/HR: 5000 INJECTION INTRAVENOUS; SUBCUTANEOUS at 11:32

## 2020-10-30 RX ADMIN — HEPARIN SODIUM 0 UNIT(S)/HR: 5000 INJECTION INTRAVENOUS; SUBCUTANEOUS at 10:29

## 2020-10-30 RX ADMIN — HEPARIN SODIUM 1600 UNIT(S)/HR: 5000 INJECTION INTRAVENOUS; SUBCUTANEOUS at 03:39

## 2020-10-30 RX ADMIN — HEPARIN SODIUM 7000 UNIT(S): 5000 INJECTION INTRAVENOUS; SUBCUTANEOUS at 03:35

## 2020-10-30 RX ADMIN — HEPARIN SODIUM 1300 UNIT(S)/HR: 5000 INJECTION INTRAVENOUS; SUBCUTANEOUS at 18:43

## 2020-10-30 NOTE — CHART NOTE - NSCHARTNOTEFT_GEN_A_CORE
Per radiology Ct angio of chest  positive for PE. preliminary result -   There is a partial filling defect at the distal right pulmonary artery and a filling defect at the right lower lobar artery extending into the segmental branches. There is also a filling defect identified at the left lower lobar artery. result discussed with Hospitalist Dr. Barnes who recommended to start on heparin drip with bolus. Patient seen at bedside. Ct scan result discussed with patient. Explained to patient risk and benefits  anticoagulation. Patient verbalized understanding. Heparin drip ordered. Primary RN informed. Will follow up PTT. Per radiology Ct angio of chest  positive for PE. Preliminary result -   There is a partial filling defect at the distal right pulmonary artery and a filling defect at the right lower lobar artery extending into the segmental branches. There is also a filling defect identified at the left lower lobar artery. result discussed with Hospitalist Dr. Barnes who recommended to start on heparin drip with bolus. Patient seen at bedside. Ct scan result discussed with patient. Explained to patient risk and benefits  anticoagulation. Patient verbalized understanding. Heparin drip ordered. Primary RN informed. Will follow up PTT. Per radiology Ct angio of chest  positive for PE. Preliminary result -   There is a partial filling defect at the distal right pulmonary artery and a filling defect at the right lower lobar artery extending into the segmental branches. There is also a filling defect identified at the left lower lobar artery. result discussed with Hospitalist Dr. Barnes who recommended to start on heparin drip with bolus. Patient seen at bedside. Ct scan result discussed with patient. Explained to patient risk and benefits of anticoagulation. Patient verbalized understanding. Heparin drip ordered. Primary RN informed. Will follow up PTT.

## 2020-10-30 NOTE — PROGRESS NOTE ADULT - SUBJECTIVE AND OBJECTIVE BOX
chief complaint:    Subjective: No new complaints. Denies HA, lightheadedness, dizziness, CP, SOB, abdominal pain, N/V.      Interval events:  -10/29/2020 went to pre surgical test for clearance for complex ablation on 11/3/2020 and was found to be SOB at rest with palpitation and TWI. Patient was BIBEMS from [facility] to [facility] ED and found to have PE on CTA chest 10/29 with evidence of RV strain.      MEDICATIONS  (STANDING):  heparin  Infusion.  Unit(s)/Hr (16 mL/Hr) IV Continuous <Continuous>  influenza   Vaccine 0.5 milliLiter(s) IntraMuscular once    MEDICATIONS  (PRN):  heparin   Injectable 7000 Unit(s) IV Push every 6 hours PRN For aPTT less than 40  heparin   Injectable 3500 Unit(s) IV Push every 6 hours PRN For aPTT between 40 - 57    Vital Signs Last 24 Hrs  T(C): 36.7 (30 Oct 2020 11:51), Max: 36.8 (29 Oct 2020 23:30)  T(F): 98 (30 Oct 2020 11:51), Max: 98.3 (29 Oct 2020 23:30)  HR: 74 (30 Oct 2020 11:51) (73 - 86)  BP: 116/68 (30 Oct 2020 11:51) (114/74 - 149/83)  BP(mean): 105 (29 Oct 2020 14:27) (105 - 105)  RR: 19 (30 Oct 2020 11:51) (16 - 26)  SpO2: 95% (30 Oct 2020 11:51) (93% - 97%)  I&O's Detail    29 Oct 2020 07:01  -  30 Oct 2020 07:00  --------------------------------------------------------  IN:    Oral Fluid: 236 mL  Total IN: 236 mL    OUT:    Voided (mL): 250 mL  Total OUT: 250 mL    Total NET: -14 mL      Physical Exam:  GENERAL: Lying in bed, well appearing, speaking in full sentence, in NAD  HEART: S1S2 RRR; No murmurs, rubs, or gallops appreciated  PULMONARY: mild crackles at the upper lobe, , No wheezing, tachypnea, or use of accessory muscle   ABDOMEN: + Bowel sounds present, soft, NDNT  EXTREMITIES:  Warm, well -perfused, pedal edema, distal pulses present  NEUROLOGICAL:AOx3     TELEMETERIC: SR HR 70-80, episode of AT at 1:02am  brief, patient was asymptomatic                              11.9   9.46  )-----------( 178      ( 30 Oct 2020 10:15 )             37.3     PTT - ( 30 Oct 2020 09:23 )  PTT:159.3 SEC  10-30    140  |  105  |  26<H>  ----------------------------<  174<H>  3.9   |  22  |  1.17    Ca    8.6      30 Oct 2020 02:41  Mg     2.1     10-30    TPro  7.0  /  Alb  3.9  /  TBili  0.7  /  DBili  x   /  AST  26  /  ALT  43<H>  /  AlkPhos  77  10-30    RADIOLOGY & ADDITIONAL STUDIES:  < from: Xray Chest 2 Views PA/Lat (10.29.20 @ 17:47) >  FINDINGS:    The lungs are clear.  There is no pneumothorax or pleural effusion.  Cardiomegaly.  No acute osseous abnormality.  Surgical clips seen over the midabdomen.    IMPRESSION:    Clear lungs.    < end of copied text >  CXR: pending  TTE: pending     < from: CT Angio Chest w/ IV Cont (10.29.20 @ 23:38) >  IMPRESSION:  Extensive pulmonary embolism. Suggestion right heart strain. Echo recommended for complete evaluation.    Right upper lobe 6 mm nodule is indeterminate. 6 month follow-up CT recommended for complete evaluation.    < end of copied text >    TTE 3/6/2019  DIMENSIONS:  Dimensions:     Normal Values:  LA:     3.9 cm    2.0 - 4.0 cm  Ao:     3.9 cm    2.0 - 3.8 cm  SEPTUM:0.9 cm    0.6 - 1.2 cm  PWT:    0.9 cm    0.6 - 1.1 cm  LVIDd:  4.6 cm    3.0 - 5.6 cm  LVIDs:  3.0 cm    1.8 - 4.0 cm    Derived Variables:  LVMI: 70 g/m2  RWT: 0.39  Ejection Fraction Visual Estimate: 55-60 %  ------------------------------------------------------------------------  OBSERVATIONS:  Mitral Valve: Normal mitral valve. Mild mitral  regurgitation.  Aortic Root: Aortic Root: 3.9 cm.spersad    Aortic Valve: Normal trileaflet aortic valve. Mild aortic  insufficiency.  Left Atrium: Normal left atrium.  LA volume index = 28  cc/m2.  Left Ventricle: Normal Left Ventricular Systolic Function,  (EF = 55 to 60%) Normal left ventricular internal  dimensions and wall thicknesses. Grade II diastolic  dysfunction.  Right Heart: Normal right atrium. Normal right ventricular  size and systolic function (TAPSE  2.3cm). There is mild  tricuspid regurgitation. Normal pulmonic valve.  Pericardium/PleuraNormal pericardium with no pericardial  effusion.  Hemodynamic: RA Pressure is 8 mm Hg. RV systolic pressure  is 37 mm Hg.  ------------------------------------------------------------------------  CONCLUSIONS:  1. Normal mitral valve. Mild mitral regurgitation.  2. Normal trileaflet aortic valve. Mild aortic  insufficiency.  3. Aortic Root: 3.9 cm.  4. Normal left atrium.  LA volume index = 28 cc/m2.  5. Normal left ventricular internal dimensions and wall  thicknesses.  6. Normal Left Ventricular Systolic Function,  (EF = 55 to  60%)  7. Grade II diastolic dysfunction.  8. Normal right atrium.  9. Normal right ventricular size and systolic function  (TAPSE  2.3cm).  10. RA Pressure is 8 mm Hg.  11. RV systolic pressure is 37 mm Hg.  12. There is mild tricuspid regurgitation.  13. Normal pulmonic valve.  14. Normal pericardium with no pericardial effusion.    ------------------------------------------------------------------------  Confirmed on  3/6/2019 - 07:35:47 by Kim Valera MD    < from: MR Cardiac Velocity Flow Map (09.23.20 @ 14:39) >  LVEDV: 115 ml  LVESV: 48 ml  LVSV: 67 ml  LVEF: 58 % (normal > 56%)    Cardiac output: 3.6 L/min  Cardiac index: 1.7 L/min/m2  LV mass: 81 g      LV measurements: (I=Indexed to BSA)  LVEDVI: 56 ml/m2 (normal < 95)  LVESVI: 23 ml/m2  LVSVI: 33 ml/m2  LV mass indexed: 40 g/m2 (normal < 80)      LV DANIEL: 53 mm (normal < 60)  Anterior septal wall: 7 mm  Posterior lateral wall: 6 mm      LV ESD: 33 mm    Left atrium:  The left atrium is mildly enlarged in size.  Biplane ES volume = 61 mL (normal <55)  ES LA Volume / BSA = 30 mL/m^2 (normal <30)    Right Ventricle:  There is normal RV size and normal global systolic function. There is no fatty infiltration of the RV wall. No delayed enhancement is seen within the right ventricular myocardium. There are no regional wall motion abnormalities or focal aneurysmal motion.    Right atrium:  The right atrium is enlarged in size.    Aortic valve:  Quantification was not performed; however, qualitatively there are no abnormal flow jets to suggest aortic stenosis or regurgitation.    Pulmonic valve: Quantification was not performed; however, qualitatively there are no abnormal flow jets to suggest aortic stenosis or regurgitation    Mitral valve:  No significant mitral stenosis or regurgitation.    Tricuspid valve:  No significant tricuspid stenosis or regurgitation.    Flow measurements:  No ASD or VSD. Qp/Qs measurement is 1.0.    Pericardium:  No pericardial effusion. No pericardial thickening.    Extracardiac findings:  The chest wall and mediastinum are unremarkable. No pleural effusions.  Limited evaluation of the lungs demonstrate no abnormal signal characteristics. 2.2 cm hepatic cyst.    IMPRESSION:  1.  Normal left ventricular size and systolic function. No myocardial edema. No delayed enhancement was visualized in the left ventricular myocardium. Mild left atrial enlargement.  2.  Mild right atrial enlargement. Normal right ventricular size. No delayed enhancement or fatty infiltration was visualized in the right ventricular myocardium.  3.  No ASD or VSD. Qp/Qs measurement is 1.0.  4. No valvular abnormalities.           Subjective: Currently not SOB but stated that she has not ambulated since arrival to the floor. Denies HA, lightheadedness, dizziness, CP, abdominal pain, N/V.      Interval events:  -10/29/2020 went to pre surgical test for clearance for complex ablation on 11/3/2020 and was found to be SOB at rest with palpitation and TWI. Patient was [facility]IB[facility] to [facility] ED and found to have PE on CTA chest 10/29 with evidence of RV strain.      MEDICATIONS  (STANDING):  heparin  Infusion.  Unit(s)/Hr (16 mL/Hr) IV Continuous <Continuous>  influenza   Vaccine 0.5 milliLiter(s) IntraMuscular once    MEDICATIONS  (PRN):  heparin   Injectable 7000 Unit(s) IV Push every 6 hours PRN For aPTT less than 40  heparin   Injectable 3500 Unit(s) IV Push every 6 hours PRN For aPTT between 40 - 57    Vital Signs Last 24 Hrs  T(C): 36.7 (30 Oct 2020 11:51), Max: 36.8 (29 Oct 2020 23:30)  T(F): 98 (30 Oct 2020 11:51), Max: 98.3 (29 Oct 2020 23:30)  HR: 74 (30 Oct 2020 11:51) (73 - 86)  BP: 116/68 (30 Oct 2020 11:51) (114/74 - 149/83)  BP(mean): 105 (29 Oct 2020 14:27) (105 - 105)  RR: 19 (30 Oct 2020 11:51) (16 - 26)  SpO2: 95% (30 Oct 2020 11:51) (93% - 97%)  I&O's Detail    29 Oct 2020 07:01  -  30 Oct 2020 07:00  --------------------------------------------------------  IN:    Oral Fluid: 236 mL  Total IN: 236 mL    OUT:    Voided (mL): 250 mL  Total OUT: 250 mL    Total NET: -14 mL      Physical Exam:  GENERAL: Lying in bed, well appearing, speaking in full sentence, in NAD  HEART: S1S2 RRR; No murmurs, rubs, or gallops appreciated  PULMONARY: mild crackles at the upper lobe, No wheezing, tachypnea, or use of accessory muscle   ABDOMEN: + Bowel sounds present, soft, NDNT  EXTREMITIES:  Warm, well -perfused, pedal edema, distal pulses present  NEUROLOGICAL:AOx3     TELEMETERIC: SR HR 70-80, episode of AT at 1:02am  brief, patient was asymptomatic                              11.9   9.46  )-----------( 178      ( 30 Oct 2020 10:15 )             37.3     PTT - ( 30 Oct 2020 09:23 )  PTT:159.3 SEC  10-30    140  |  105  |  26<H>  ----------------------------<  174<H>  3.9   |  22  |  1.17    Ca    8.6      30 Oct 2020 02:41  Mg     2.1     10-30    TPro  7.0  /  Alb  3.9  /  TBili  0.7  /  DBili  x   /  AST  26  /  ALT  43<H>  /  AlkPhos  77  10-30    RADIOLOGY & ADDITIONAL STUDIES:  < from: Xray Chest 2 Views PA/Lat (10.29.20 @ 17:47) >  FINDINGS:    The lungs are clear.  There is no pneumothorax or pleural effusion.  Cardiomegaly.  No acute osseous abnormality.  Surgical clips seen over the midabdomen.    IMPRESSION:    Clear lungs.    < end of copied text >  CXR: pending  TTE: pending     < from: CT Angio Chest w/ IV Cont (10.29.20 @ 23:38) >  IMPRESSION:  Extensive pulmonary embolism. Suggestion right heart strain. Echo recommended for complete evaluation.    Right upper lobe 6 mm nodule is indeterminate. 6 month follow-up CT recommended for complete evaluation.    < end of copied text >    TTE 3/6/2019  DIMENSIONS:  Dimensions:     Normal Values:  LA:     3.9 cm    2.0 - 4.0 cm  Ao:     3.9 cm    2.0 - 3.8 cm  SEPTUM:0.9 cm    0.6 - 1.2 cm  PWT:    0.9 cm    0.6 - 1.1 cm  LVIDd:  4.6 cm    3.0 - 5.6 cm  LVIDs:  3.0 cm    1.8 - 4.0 cm    Derived Variables:  LVMI: 70 g/m2  RWT: 0.39  Ejection Fraction Visual Estimate: 55-60 %  ------------------------------------------------------------------------  OBSERVATIONS:  Mitral Valve: Normal mitral valve. Mild mitral  regurgitation.  Aortic Root: Aortic Root: 3.9 cm.spersad    Aortic Valve: Normal trileaflet aortic valve. Mild aortic  insufficiency.  Left Atrium: Normal left atrium.  LA volume index = 28  cc/m2.  Left Ventricle: Normal Left Ventricular Systolic Function,  (EF = 55 to 60%) Normal left ventricular internal  dimensions and wall thicknesses. Grade II diastolic  dysfunction.  Right Heart: Normal right atrium. Normal right ventricular  size and systolic function (TAPSE  2.3cm). There is mild  tricuspid regurgitation. Normal pulmonic valve.  Pericardium/PleuraNormal pericardium with no pericardial  effusion.  Hemodynamic: RA Pressure is 8 mm Hg. RV systolic pressure  is 37 mm Hg.  ------------------------------------------------------------------------  CONCLUSIONS:  1. Normal mitral valve. Mild mitral regurgitation.  2. Normal trileaflet aortic valve. Mild aortic  insufficiency.  3. Aortic Root: 3.9 cm.  4. Normal left atrium.  LA volume index = 28 cc/m2.  5. Normal left ventricular internal dimensions and wall  thicknesses.  6. Normal Left Ventricular Systolic Function,  (EF = 55 to  60%)  7. Grade II diastolic dysfunction.  8. Normal right atrium.  9. Normal right ventricular size and systolic function  (TAPSE  2.3cm).  10. RA Pressure is 8 mm Hg.  11. RV systolic pressure is 37 mm Hg.  12. There is mild tricuspid regurgitation.  13. Normal pulmonic valve.  14. Normal pericardium with no pericardial effusion.    ------------------------------------------------------------------------  Confirmed on  3/6/2019 - 07:35:47 by Kim Valera MD    < from: MR Cardiac Velocity Flow Map (09.23.20 @ 14:39) >  LVEDV: 115 ml  LVESV: 48 ml  LVSV: 67 ml  LVEF: 58 % (normal > 56%)    Cardiac output: 3.6 L/min  Cardiac index: 1.7 L/min/m2  LV mass: 81 g      LV measurements: (I=Indexed to BSA)  LVEDVI: 56 ml/m2 (normal < 95)  LVESVI: 23 ml/m2  LVSVI: 33 ml/m2  LV mass indexed: 40 g/m2 (normal < 80)      LV DANIEL: 53 mm (normal < 60)  Anterior septal wall: 7 mm  Posterior lateral wall: 6 mm      LV ESD: 33 mm    Left atrium:  The left atrium is mildly enlarged in size.  Biplane ES volume = 61 mL (normal <55)  ES LA Volume / BSA = 30 mL/m^2 (normal <30)    Right Ventricle:  There is normal RV size and normal global systolic function. There is no fatty infiltration of the RV wall. No delayed enhancement is seen within the right ventricular myocardium. There are no regional wall motion abnormalities or focal aneurysmal motion.    Right atrium:  The right atrium is enlarged in size.    Aortic valve:  Quantification was not performed; however, qualitatively there are no abnormal flow jets to suggest aortic stenosis or regurgitation.    Pulmonic valve: Quantification was not performed; however, qualitatively there are no abnormal flow jets to suggest aortic stenosis or regurgitation    Mitral valve:  No significant mitral stenosis or regurgitation.    Tricuspid valve:  No significant tricuspid stenosis or regurgitation.    Flow measurements:  No ASD or VSD. Qp/Qs measurement is 1.0.    Pericardium:  No pericardial effusion. No pericardial thickening.    Extracardiac findings:  The chest wall and mediastinum are unremarkable. No pleural effusions.  Limited evaluation of the lungs demonstrate no abnormal signal characteristics. 2.2 cm hepatic cyst.    IMPRESSION:  1.  Normal left ventricular size and systolic function. No myocardial edema. No delayed enhancement was visualized in the left ventricular myocardium. Mild left atrial enlargement.  2.  Mild right atrial enlargement. Normal right ventricular size. No delayed enhancement or fatty infiltration was visualized in the right ventricular myocardium.  3.  No ASD or VSD. Qp/Qs measurement is 1.0.  4. No valvular abnormalities.

## 2020-10-30 NOTE — CHART NOTE - NSCHARTNOTEFT_GEN_A_CORE
~                                                       Medicine Subsequent Hospital Care Note- ACP  CC: c/o chest tightness   HPI/Subjective: Notified by RN that patient c/o chest tightness. patient seen nada examined at bedside. patient repots right sided chest tightness which started approximately one half hours ago. Reports tightness  as constant.     CONSTITUTIONAL: No weakness, fevers or chills  RESPIRATORY: Dyspnoea on exertion   CARDIOVASCULAR: Positive chest right tightness  GASTROINTESTINAL: No abdominal or epigastric pain. No nausea, vomiting  NEUROLOGICAL: No headache,     Viital Signs Last 24 Hrs  T(C): 37 (10-30-20 @ 20:48), Max: 37 (10-30-20 @ 20:48)  T(F): 98.6 (10-30-20 @ 20:48), Max: 98.6 (10-30-20 @ 20:48)  HR: 85 (10-30-20 @ 20:48) (73 - 87)  BP: 138/66 (10-30-20 @ 20:48) (114/74 - 138/66)  RR: 18 (10-30-20 @ 20:48) (18 - 21)  SpO2: 94% (10-30-20 @ 20:48) (93% - 96%) on (O2)    Telemetry/Alarms:  Neurology: Awake, nonfocal, BELL x 4  Respiratory: CTA B/L, No wheezing, rales, rhonchi  CV: RRR, S1S2, no murmurs, rubs or gallops, Reproducible chest pain  Abdominal: Soft, NT, ND +BS,   Extremities: Positive pedal edema , + peripheral pulses  Psych: Oriented x 3, normal affect                          11.9   9.46  )-----------( 178      ( 30 Oct 2020 10:15 )             37.3     10-30    140  |  105  |  26<H>  ----------------------------<  174<H>  3.9   |  22  |  1.17    Ca    8.6      30 Oct 2020 02:41  Mg     2.1     10-30    TPro  7.0  /  Alb  3.9  /  TBili  0.7  /  DBili  x   /  AST  26  /  ALT  43<H>  /  AlkPhos  77  10-30    PTT - ( 30 Oct 2020 17:19 )  PTT:75.4 SEC  MEDICATIONS  (STANDING):  acetaminophen   Tablet .. 650 milliGRAM(s) Oral once  heparin  Infusion.  Unit(s)/Hr (16 mL/Hr) IV Continuous <Continuous>  influenza   Vaccine 0.5 milliLiter(s) IntraMuscular once    MEDICATIONS  (PRN):  heparin   Injectable 7000 Unit(s) IV Push every 6 hours PRN For aPTT less than 40  heparin   Injectable 3500 Unit(s) IV Push every 6 hours PRN For aPTT between 40 - 57    I&O's Summary    29 Oct 2020 07:01  -  30 Oct 2020 07:00  --------------------------------------------------------  IN: 236 mL / OUT: 250 mL / NET: -14 mL    30 Oct 2020 07:01  -  30 Oct 2020 21:08        I ~                                                       Medicine Subsequent Hospital Care Note- ACP  CC: c/o chest tightness   HPI/Subjective: Notified by RN that patient c/o chest tightness. patient seen and examined at bedside. Patient repots right sided chest tightness which started approximately one half hours ago. Reports tightness  as constant and denies radiation to left arm or jaw.     CONSTITUTIONAL: No weakness, fevers or chills  RESPIRATORY: Dyspnoea on exertion   CARDIOVASCULAR: Positive chest right tightness  GASTROINTESTINAL: No abdominal or epigastric pain. No nausea, vomiting  NEUROLOGICAL: No headache,     Viital Signs Last 24 Hrs  T(C): 37 (10-30-20 @ 20:48), Max: 37 (10-30-20 @ 20:48)  T(F): 98.6 (10-30-20 @ 20:48), Max: 98.6 (10-30-20 @ 20:48)  HR: 85 (10-30-20 @ 20:48) (73 - 87)  BP: 138/66 (10-30-20 @ 20:48) (114/74 - 138/66)  RR: 18 (10-30-20 @ 20:48) (18 - 21)  SpO2: 94% (10-30-20 @ 20:48) (93% - 96%) on (O2)    Telemetry/Alarms: Sinus rhythm with runs of ?ATach  65F with PMHx AT s/p failed ablation (not performed in 2015), positive tilt table/orthostatic hypotension, VSD, non-obstructive CAD, PVCs, chronic palpitations, renal donor (s/p L nephrectomy for son) presenting from [place] for palpitations and SOB with exertion    65F with PMHx AT s/p failed ablation (not performed in 2015), positive tilt table/orthostatic hypotension, VSD, non-obstructive CAD, PVCs, chronic palpitations, renal donor (s/p L nephrectomy for son) presenting from PST for palpitations and SOB with exertion    65F with PMHx AT s/p failed ablation (not performed in 2015), positive tilt table/orthostatic hypotension, VSD, non-obstructive CAD, PVCs, chronic palpitations, renal donor (s/p L nephrectomy for son) presenting from PST for palpitations and SOB with exertion      Neurology: Awake, nonfocal, BELL x 4  Respiratory: CTA B/L, No wheezing, rales, rhonchi  CV: RRR, S1S2, no murmurs, rubs or gallops, Reproducible chest pain  Abdominal: Soft, NT, ND +BS,   Extremities: Positive pedal edema , + peripheral pulses  Psych: Oriented x 3, normal affect                          11.9   9.46  )-----------( 178      ( 30 Oct 2020 10:15 )             37.3     10-30    140  |  105  |  26<H>  ----------------------------<  174<H>  3.9   |  22  |  1.17    Ca    8.6      30 Oct 2020 02:41  Mg     2.1     10-30    TPro  7.0  /  Alb  3.9  /  TBili  0.7  /  DBili  x   /  AST  26  /  ALT  43<H>  /  AlkPhos  77  10-30    PTT - ( 30 Oct 2020 17:19 )  PTT:75.4 SEC  MEDICATIONS  (STANDING):  acetaminophen   Tablet .. 650 milliGRAM(s) Oral once  heparin  Infusion.  Unit(s)/Hr (16 mL/Hr) IV Continuous <Continuous>  influenza   Vaccine 0.5 milliLiter(s) IntraMuscular once    MEDICATIONS  (PRN):  heparin   Injectable 7000 Unit(s) IV Push every 6 hours PRN For aPTT less than 40  heparin   Injectable 3500 Unit(s) IV Push every 6 hours PRN For aPTT between 40 - 57    I&O's Summary    29 Oct 2020 07:01  -  30 Oct 2020 07:00  --------------------------------------------------------  IN: 236 mL / OUT: 250 mL / NET: -14 mL    30 Oct 2020 07:01  -  30 Oct 2020 21:08      65F with PMHx AT s/p failed ablation (not performed in 2015), positive tilt table/orthostatic hypotension, VSD, non-obstructive CAD, PVCs, chronic palpitations, presents with shortness of breath positive PE and now with right sided chest tightness.    Right sided chest tightness:  Pain is reproducible on palpation. Chest tightness likely due  to PE.   Will continue heparin drip   Tylenol 650 mg po x1   Stat EKG done which showed no changes from EKG   Will continue telemonitoring           I ~                                                       Medicine Subsequent Hospital Care Note- ACP  CC: c/o chest tightness   HPI/Subjective: Notified by RN that patient c/o chest tightness. patient seen and examined at bedside. Patient repots right sided chest tightness which started approximately one and half hours ago. Reports tightness  as constant and denies radiation to left arm or jaw.     CONSTITUTIONAL: No weakness, fevers or chills  RESPIRATORY: Dyspnoea on exertion   CARDIOVASCULAR: Positive chest right tightness  GASTROINTESTINAL: No abdominal or epigastric pain. No nausea, vomiting  NEUROLOGICAL: No headache,     Viital Signs Last 24 Hrs  T(C): 37 (10-30-20 @ 20:48), Max: 37 (10-30-20 @ 20:48)  T(F): 98.6 (10-30-20 @ 20:48), Max: 98.6 (10-30-20 @ 20:48)  HR: 85 (10-30-20 @ 20:48) (73 - 87)  BP: 138/66 (10-30-20 @ 20:48) (114/74 - 138/66)  RR: 18 (10-30-20 @ 20:48) (18 - 21)  SpO2: 94% (10-30-20 @ 20:48) (93% - 96%) on (O2)    Telemetry/Alarms: Sinus rhythm with runs of ?ATach    Neurology: Awake, nonfocal, BELL x 4  Respiratory: CTA B/L, No wheezing, rales, rhonchi  CV: RRR, S1S2, no murmurs, rubs or gallops, Reproducible chest pain  Abdominal: Soft, NT, ND +BS,   Extremities: Positive pedal edema , + peripheral pulses  Psych: Oriented x 3, normal affect                          11.9   9.46  )-----------( 178      ( 30 Oct 2020 10:15 )             37.3     10-30    140  |  105  |  26<H>  ----------------------------<  174<H>  3.9   |  22  |  1.17    Ca    8.6      30 Oct 2020 02:41  Mg     2.1     10-30    TPro  7.0  /  Alb  3.9  /  TBili  0.7  /  DBili  x   /  AST  26  /  ALT  43<H>  /  AlkPhos  77  10-30    PTT - ( 30 Oct 2020 17:19 )  PTT:75.4 SEC  MEDICATIONS  (STANDING):  acetaminophen   Tablet .. 650 milliGRAM(s) Oral once  heparin  Infusion.  Unit(s)/Hr (16 mL/Hr) IV Continuous <Continuous>  influenza   Vaccine 0.5 milliLiter(s) IntraMuscular once    MEDICATIONS  (PRN):  heparin   Injectable 7000 Unit(s) IV Push every 6 hours PRN For aPTT less than 40  heparin   Injectable 3500 Unit(s) IV Push every 6 hours PRN For aPTT between 40 - 57    I&O's Summary    29 Oct 2020 07:01  -  30 Oct 2020 07:00  --------------------------------------------------------  IN: 236 mL / OUT: 250 mL / NET: -14 mL    30 Oct 2020 07:01  -  30 Oct 2020 21:08      65F with PMHx AT s/p failed ablation (not performed in 2015), positive tilt table/orthostatic hypotension, VSD, non-obstructive CAD, PVCs, chronic palpitations, presents with shortness of breath diagnosed with extensive PE and   now with right sided chest tightness.    Right sided chest tightness:  Pain is reproducible on palpation. Chest tightness likely due  to PE.   Will continue heparin drip   Tylenol 650 mg po x1   Stat EKG done which showed no changes from prior EKG done on 10/29/20.   Will continue telemonitoring           I ~                                                       Medicine Subsequent Hospital Care Note- ACP  CC: c/o chest tightness   HPI/Subjective: Notified by RN that patient c/o chest tightness. patient seen and examined at bedside. Patient repots right sided chest tightness which started approximately one and half hours ago. Reports tightness  as constant and denies radiation to left arm or jaw.     CONSTITUTIONAL: No weakness, fevers or chills  RESPIRATORY: Dyspnoea on exertion   CARDIOVASCULAR: Positive chest right tightness  GASTROINTESTINAL: No abdominal or epigastric pain. No nausea, vomiting  NEUROLOGICAL: No headache, No dizziness    Viital Signs Last 24 Hrs  T(C): 37 (10-30-20 @ 20:48), Max: 37 (10-30-20 @ 20:48)  T(F): 98.6 (10-30-20 @ 20:48), Max: 98.6 (10-30-20 @ 20:48)  HR: 85 (10-30-20 @ 20:48) (73 - 87)  BP: 138/66 (10-30-20 @ 20:48) (114/74 - 138/66)  RR: 18 (10-30-20 @ 20:48) (18 - 21)  SpO2: 94% (10-30-20 @ 20:48) (93% - 96%) on (O2)    Telemetry/Alarms: Sinus rhythm with runs of ?ATach    Neurology: Awake, nonfocal, BELL x 4  Respiratory: CTA B/L, No wheezing, rales, rhonchi  CV: RRR, S1S2, no murmurs, rubs or gallops, Reproducible chest pain  Abdominal: Soft, NT, ND +BS,   Extremities: Positive pedal edema , + peripheral pulses  Psych: Oriented x 3, normal affect                          11.9   9.46  )-----------( 178      ( 30 Oct 2020 10:15 )             37.3     10-30    140  |  105  |  26<H>  ----------------------------<  174<H>  3.9   |  22  |  1.17    Ca    8.6      30 Oct 2020 02:41  Mg     2.1     10-30    TPro  7.0  /  Alb  3.9  /  TBili  0.7  /  DBili  x   /  AST  26  /  ALT  43<H>  /  AlkPhos  77  10-30    PTT - ( 30 Oct 2020 17:19 )  PTT:75.4 SEC  MEDICATIONS  (STANDING):  acetaminophen   Tablet .. 650 milliGRAM(s) Oral once  heparin  Infusion.  Unit(s)/Hr (16 mL/Hr) IV Continuous <Continuous>  influenza   Vaccine 0.5 milliLiter(s) IntraMuscular once    MEDICATIONS  (PRN):  heparin   Injectable 7000 Unit(s) IV Push every 6 hours PRN For aPTT less than 40  heparin   Injectable 3500 Unit(s) IV Push every 6 hours PRN For aPTT between 40 - 57    I&O's Summary    29 Oct 2020 07:01  -  30 Oct 2020 07:00  --------------------------------------------------------  IN: 236 mL / OUT: 250 mL / NET: -14 mL    30 Oct 2020 07:01  -  30 Oct 2020 21:08      65F with PMHx AT s/p failed ablation (not performed in 2015), positive tilt table/orthostatic hypotension, VSD, non-obstructive CAD, PVCs, chronic palpitations, presents with shortness of breath diagnosed with extensive PE and   now with right sided chest tightness.    Right sided chest tightness:  Pain is reproducible on palpation. Chest tightness likely due  to PE.   Will continue heparin drip   Tylenol 650 mg po x1   Stat EKG done which showed no changes from prior EKG done on 10/29/20.   Will continue telemonitoring           I

## 2020-10-30 NOTE — CONSULT NOTE ADULT - ATTENDING COMMENTS
Agree with above.  Patient seen and examined. Chart reviewed.     65 year old woman presented with shortness of breath worse than her usual baseline over the last 6 days after returning from a trip to [place] - 10/28 on CTA found to have large bilateral PEs with enlarged RV troponin negative but elevated pro BNP.  Patient reports that she has had problems with palpitations and shortness of breath for many years failed ablation in 2015.  At baseline has dyspnea with exertion gets short of breath if she stands for more than 10 minutes. Over the last 6 days feels like her shortness of breath is worse.     - continue heparin   - will need lower extremity dopplers  - echocardiogram  - reports VSD however recent cardiac MRI does not show VSD  - supplemental oxygen as needed to keep saturation over 92%   does not need MICU level care at this time however will have pulmonary team follow the patient   please call with questions.

## 2020-10-30 NOTE — CONSULT NOTE ADULT - ASSESSMENT
65F AT s/p failed ablation (not performed in 2015), VSD, positive tilt table/orthostatic hypotension, VSD, non-obstructive CAD, PVCs, chronic palpitations, renal donor (s/p L nephrectomy for son) presenting from PST for palpitations and SOB at rest in setting of recent travel to [place] [facility] [place]. Found to have PE on CTA chest 10/29 with evidence of RV strain. MICU consulted for closer monitoring.    #Pulmonary Embolism  - CTA chest 10/29 shows bilateral pulmonary emboli with evidence of right heart strain  - Hemodynamically stable with BP in the 120's/80's  - Patient breathing comfortably on room air. SpO2 in the mid 90's  - Recommend formal TTE and lower extremity Dopplers  - Not a MICU candidate at this time    Ulises Solis MD  Internal Medicine PGY-2  [phone] / [phone] 65F AT s/p failed ablation (not performed in 2015), positive tilt table/orthostatic hypotension, VSD, non-obstructive CAD, PVCs, chronic palpitations, renal donor (s/p L nephrectomy for son) presenting from PST for palpitations and SOB at rest in setting of recent travel to the [place]. Found to have PE on CTA chest 10/29 with evidence of RV strain. MICU consulted for closer monitoring.    #Pulmonary Embolism  - Reported history of VSD but cardiac MRI 9/23/20 did not show a VSD  - CTA chest 10/29 shows bilateral pulmonary emboli with evidence of right heart strain  - Hemodynamically stable with BP in the 120's/80's  - Patient breathing comfortably on room air. SpO2 in the mid 90's  - Recommend formal TTE and lower extremity Dopplers  - Not a MICU candidate at this time    Ulises Solis MD  Internal Medicine PGY-2  [phone] / [phone] 65F AT s/p failed ablation (not performed in 2015), positive tilt table/orthostatic hypotension, VSD, non-obstructive CAD, PVCs, chronic palpitations, renal donor (s/p L nephrectomy for son) presenting from PST for palpitations and SOB at rest in setting of recent travel to the [place]. Found to have PE on CTA chest 10/29 with evidence of RV strain. MICU consulted for closer monitoring.    #Pulmonary Embolism  - Reported history of VSD but cardiac MRI 9/23/20 did not show a VSD  - CTA chest 10/29 shows bilateral pulmonary emboli with evidence of right heart strain  - Hemodynamically stable with BP in the 120's/80's  - Patient breathing comfortably on room air. SpO2 in the mid 90's  - c/w heparin gtt  - Recommend formal TTE and lower extremity Dopplers  - Not a MICU candidate at this time    Ulises Solis MD  Internal Medicine PGY-2  [phone] / [phone]

## 2020-10-30 NOTE — PROGRESS NOTE ADULT - ASSESSMENT
65F AT s/p failed ablation, positive tilt table/orthostatic hypotension, non-obstructive CAD, PVCs, chronic palpitations, renal donor (s/p L nephrectomy for son) presenting from PST for palpitations and SOB at rest in setting of recent travel to the [place]. Found to have PE on CTA chest 10/29 with evidence of RV strain. Discussed EP study and AT ablation and patient is consented. The risk and benefits for each procedure were explain in detail which included but not limited to bleeding, infection, stroke, cardiac tamponade and death. Patient expressed understanding an all questions were answered     Plan:  - Continuous telemetric monitoring for AT  - Monitor electrolytes and replete for K>4 and Mg>2  - Obtain TTE  - PE management per MICU team  - Discussed EP study and AT ablation and patient is consented. The risk and benefits for each procedure were explain in detail which included but not limited to bleeding, infection, stroke, cardiac tamponade and death. Patient expressed understanding an all questions were answered    Gary Hunt PA-C   65F AT s/p failed ablation, positive tilt table/orthostatic hypotension, non-obstructive CAD, PVCs, chronic palpitations, renal donor (s/p L nephrectomy for son) presenting from PST for palpitations and SOB at rest in setting of recent travel to the [place]. Found to have PE on CTA chest 10/29 with evidence of RV strain. Discussed EP study and AT ablation and patient is consented. The risk and benefits for each procedure were explain in detail which included but not limited to bleeding, infection, stroke, cardiac tamponade and death. Patient expressed understanding an all questions were answered     Plan:  - Continuous telemetric monitoring for AT  - Monitor electrolytes and replete for K>4 and Mg>2  - Obtain TTE  - PE management per MICU team  - Plan to start Xarelto after heparin gtt  - Discussed EP study and AT ablation. Patient is consented but given PE procedure has been canceled. The risk and benefits for each procedure were explain in detail which included but not limited to bleeding, infection, stroke, cardiac tamponade and death. Patient expressed understanding an all questions were answered    Gary Hunt PA-C

## 2020-10-30 NOTE — CHART NOTE - NSCHARTNOTEFT_GEN_A_CORE
: Tone Mac    INDICATION: PE    PROCEDURE:  [X] LIMITED ECHO  [ ] LIMITED CHEST  [ ] LIMITED RETROPERITONEAL  [ ] LIMITED ABDOMINAL  [ ] LIMITED DVT  [ ] NEEDLE GUIDANCE VASCULAR  [ ] NEEDLE GUIDANCE THORACENTESIS  [ ] NEEDLE GUIDANCE PARACENTESIS  [ ] NEEDLE GUIDANCE PERICARDIOCENTESIS  [ ] OTHER    FINDINGS: Grossly normal LV systolic function. Septal bounce noted. RV > LV in size. RV wall thickening noted. Positive Freeman's sign. IVC not dilated.       INTERPRETATION: RV dilatation and septal bounce noted in submassive PE. RV wall thickening suggest some degree of chronic pulmonary hypertension.     Images stored on [employer].    Bernard Mac MD  Pulmonary & Critical Care Fellow  [phone] : Tone Mac    INDICATION: PE    PROCEDURE:  [X] LIMITED ECHO  [ ] LIMITED CHEST  [ ] LIMITED RETROPERITONEAL  [ ] LIMITED ABDOMINAL  [ ] LIMITED DVT  [ ] NEEDLE GUIDANCE VASCULAR  [ ] NEEDLE GUIDANCE THORACENTESIS  [ ] NEEDLE GUIDANCE PARACENTESIS  [ ] NEEDLE GUIDANCE PERICARDIOCENTESIS  [ ] OTHER    FINDINGS: Grossly normal LV systolic function. Septal bounce noted. RV > LV in size. RV wall thickening noted. Positive Freeman's sign. IVC not dilated.       INTERPRETATION: RV dilatation and septal bounce noted in submassive PE. RV wall thickening suggest some degree of chronic pulmonary hypertension.     Bernard Mac MD  Pulmonary & Critical Care Fellow  [phone]    Images uploaded on [employer]    Attending Attestation:  I was present during the key portions of the procedure and immediately available during the entire procedure.  Vinay Wayne DO  Attending  Pulmonary & Critical Care Medicine

## 2020-10-30 NOTE — CONSULT NOTE ADULT - ASSESSMENT
Assessment and Plan    65F with PMHx AT s/p failed ablation (not performed in 2015), positive tilt table/orthostatic hypotension, VSD, non-obstructive CAD, PVCs, chronic palpitations, renal donor (s/p L nephrectomy for son) presenting from PST for palpitations and SOB with exertion    EKG: NSR with twave inversions in leads AVF, III, V3-V6  Tele: NSR with PAT    1. SOB  -patient states has SOB on exertion for past week  -CTA chest with extensive PE in all 5 lobes. on hep gtt. f/u MICU consult  -currently hemodynamically stable and not requiring oxygen at rest  -right heart strain noted on CTA chest, f/u echo (last echo on 03/2019 normal)  -LE dopplers pending    2. Palpitations  -patient diagnosed with AT and was getting workup for outpatient ablation  -f/u EP    3. Transaminitis  -patient with elevated LFTs  -denies abdominal pain  -abd US pending     Assessment and Plan    65F with PMHx AT s/p failed ablation (not performed in 2015), positive tilt table/orthostatic hypotension, VSD, non-obstructive CAD, PVCs, chronic palpitations, renal donor (s/p L nephrectomy for son) presenting from PST for palpitations and SOB with exertion    EKG: NSR with twave inversions in leads AVF, III, V3-V6  Tele: NSR with PAT    1. SOB  -patient states has SOB on exertion for past week  -CTA chest with extensive PE in all 5 lobes. on hep gtt. f/u MICU consult appreciated   -currently hemodynamically stable and not requiring oxygen at rest  -right heart strain noted on CTA chest, echo with Dilated RV and Decreased RV function , No troponin elevation , RV normal on MRI on 9/23/2020   -LE dopplers with acute Rt peroneal and PT vein DVT   -c/w heparin , discussed catheter directed lytics with family and Dr Nhung ruiz, given hemodynamic stability and Pt not hypoxic will hold off for now   - Will consider Digoxin if increased Atrial Tac burden and to help with RV contractility   - will get heme eval for hypercoagulable work up      2. Palpitations  -patient diagnosed with AT and was getting workup for outpatient ablation  -f/u EP    3. Transaminitis  -patient with elevated LFTs  -denies abdominal pain  -abd US pending

## 2020-10-30 NOTE — CONSULT NOTE ADULT - ATTENDING COMMENTS
64 y/o F with PMH as above presented with dyspnea  Patient found to have a submassive PE  Clinically, the patient is stable on 2L NC  Hemodynamically stable as well  GDE: nl LV function with e/o RV strain (+) Hadley sign  Check formal ECHO  Perform LE dopplers  Consult interventional cardiology for possible CDT  Cont heparin gtt, trend aPTT  Consider age appropriate cancer screening 64 y/o F with PMH as above presented with dyspnea and palpitations, found to have acute hypoxemic respiratory failure  On CTA found to have a submassive PE  Clinically, the patient is stable on 2L NC  Hemodynamically stable as well  GDE: nl LV function with e/o RV strain (+) Hadley sign  Check formal ECHO  Perform LE dopplers  Consult interventional cardiology for possible CDT  Cont heparin gtt, trend aPTT  Consider age appropriate cancer screening

## 2020-10-30 NOTE — CONSULT NOTE ADULT - SUBJECTIVE AND OBJECTIVE BOX
CHIEF COMPLAINT:    HPI:  65F AT s/p failed ablation (not performed in 2015), VSD, positive tilt table/orthostatic hypotension, VSD, non-obstructive CAD, PVCs, chronic palpitations, renal donor (s/p L nephrectomy for son) presenting from PST for palpitations and SOB at rest. Pt initially started having palpitations 5 years ago but was unable to have ablation then. Since patient has on and off palpitations but has been able to perform ADLs. On 10/9 seen by Dr. Quijano and noted to have runs of Atach on holter monitor and plan was for EP study and ablation. Today patient presented to [facility] and noted to have palpitations, SOB at rest over last 6 days and TWI on EKG done. Sent to ED. Per patient she recently visited the [place] and had mild SOB prior to this trip 6 days ago which worsened during her stay. ET only about 10 steps but formerly multiple blocks. Denies orthopnea to me (endorsed to other provider). She states SOB is specifically when she gets palpitations which have started to have more in frequency and severity. At this point it is limiting her ADLs. Of note she also had CP left sided, non-radiating 3 days ago occurring overnight. This self resolved on its own and she hasn't had since. Denies hx prior CHF. Endorses dysuria 5 days ago now resolved, but denies fevers, chills, polyuria, foul smelling urine. Also endorses leg swelling b/l but now improved today. She only noticed this yesterday.    MICU consulted for closer monitoring. Patient currently reporting dyspnea after speaking for 5 minutes and a brief episode of right calf pain this AM. Currently denies lightheadedness, blurry vision, muscle weakness, chest pain, palpitations, nausea, vomiting, and abdominal pain.    PAST MEDICAL & SURGICAL HISTORY:  Atrial tachycardia    Ventricular septal defect    History of orthostatic hypotension    Single kidney    S/p nephrectomy  donated kidney- left 1984        FAMILY HISTORY:  Family history of prostate cancer (Father)        SOCIAL HISTORY:  Smoking: __ packs x ___ years  EtOH Use:  Marital Status:  Occupation:  Recent Travel:  Country of Birth:  Advance Directives:    Allergies    No Known Allergies    Intolerances        HOME MEDICATIONS:    REVIEW OF SYSTEMS:    CONSTITUTIONAL: No weakness, fevers or chills  EYES/ENT: No visual changes;  No vertigo or throat pain   NECK: No pain or stiffness  RESPIRATORY: +dyspnea after talking for more than 5 minutes. No cough, wheezing, hemoptysis  CARDIOVASCULAR: No chest pain or palpitations  GASTROINTESTINAL: No abdominal or epigastric pain. No nausea, vomiting, or hematemesis; No diarrhea or constipation. No melena or hematochezia.  GENITOURINARY: No dysuria, frequency or hematuria  NEUROLOGICAL: No numbness or weakness  SKIN: No itching, burning, rashes, or lesions   All other review of systems is negative unless indicated above.    OBJECTIVE:  ICU Vital Signs Last 24 Hrs  T(C): 36.8 (30 Oct 2020 05:15), Max: 36.8 (29 Oct 2020 23:30)  T(F): 98.2 (30 Oct 2020 05:15), Max: 98.3 (29 Oct 2020 23:30)  HR: 73 (30 Oct 2020 05:15) (73 - 86)  BP: 116/72 (30 Oct 2020 05:15) (114/74 - 149/83)  BP(mean): 105 (29 Oct 2020 14:27) (105 - 105)  ABP: --  ABP(mean): --  RR: 19 (30 Oct 2020 05:15) (16 - 26)  SpO2: 96% (30 Oct 2020 05:15) (93% - 97%)        10-29 @ 07:01  -  10-30 @ 07:00  --------------------------------------------------------  IN: 236 mL / OUT: 250 mL / NET: -14 mL      CAPILLARY BLOOD GLUCOSE          PHYSICAL EXAM:  GENERAL: No acute distress, currently not on supplemental O2  HEAD:  Atraumatic, Normocephalic  CHEST/LUNG: Decreased breath sounds in right lung fields  HEART: Regular rate and rhythm. Normal S1/S2. No murmurs, rubs, or gallops  ABDOMEN: Soft, non-tender, non-distended; normal bowel sounds, no organomegaly  EXTREMITIES: Non-pitting edema of b/l LE. 2+ peripheral pulses b/l, No clubbing, cyanosis.  NEUROLOGY: A&O x 3, no focal deficits  SKIN: No rashes or lesions    HOSPITAL MEDICATIONS:  MEDICATIONS  (STANDING):  heparin  Infusion.  Unit(s)/Hr (16 mL/Hr) IV Continuous <Continuous>  influenza   Vaccine 0.5 milliLiter(s) IntraMuscular once    MEDICATIONS  (PRN):  heparin   Injectable 7000 Unit(s) IV Push every 6 hours PRN For aPTT less than 40  heparin   Injectable 3500 Unit(s) IV Push every 6 hours PRN For aPTT between 40 - 57      LABS:                        11.9   9.46  )-----------( 178      ( 30 Oct 2020 10:15 )             37.3     10-30    140  |  105  |  26<H>  ----------------------------<  174<H>  3.9   |  22  |  1.17    Ca    8.6      30 Oct 2020 02:41  Mg     2.1     10-30    TPro  7.0  /  Alb  3.9  /  TBili  0.7  /  DBili  x   /  AST  26  /  ALT  43<H>  /  AlkPhos  77  10-30    PTT - ( 30 Oct 2020 09:23 )  PTT:159.3 SEC  Urinalysis Basic - ( 30 Oct 2020 02:40 )    Color: LIGHT YELLOW / Appearance: CLEAR / SG: > 1.040 / pH: 5.5  Gluc: NEGATIVE / Ketone: NEGATIVE  / Bili: NEGATIVE / Urobili: NORMAL   Blood: NEGATIVE / Protein: NEGATIVE / Nitrite: NEGATIVE   Leuk Esterase: MODERATE / RBC: 0-2 / WBC 11-25   Sq Epi: OCC / Non Sq Epi: x / Bacteria: MANY            MICROBIOLOGY:     RADIOLOGY:  [ ] Reviewed and interpreted by me    EKG: CHIEF COMPLAINT:    HPI:  65F AT s/p failed ablation (not performed in 2015), VSD, positive tilt table/orthostatic hypotension, VSD, non-obstructive CAD, PVCs, chronic palpitations, renal donor (s/p L nephrectomy for son) presenting from PST for palpitations and SOB at rest. Pt initially started having palpitations 5 years ago but was unable to have ablation then. Since patient has on and off palpitations but has been able to perform ADLs. On 10/9 seen by Dr. Quijano and noted to have runs of Atach on holter monitor and plan was for EP study and ablation. Today patient presented to [facility] and noted to have palpitations, SOB at rest over last 6 days and TWI on EKG done. Sent to ED. Per patient she recently visited the DR and had mild SOB prior to this trip 6 days ago which worsened during her stay. ET only about 10 steps but formerly multiple blocks. Denies orthopnea to me (endorsed to other provider). She states SOB is specifically when she gets palpitations which have started to have more in frequency and severity. At this point it is limiting her ADLs. Of note she also had CP left sided, non-radiating 3 days ago occurring overnight. This self resolved on its own and she hasn't had since. Denies hx prior CHF. Endorses dysuria 5 days ago now resolved, but denies fevers, chills, polyuria, foul smelling urine. Also endorses leg swelling b/l but now improved today. She only noticed this yesterday.    MICU consulted for closer monitoring. Patient currently reporting dyspnea after speaking for 5 minutes and a brief episode of right calf pain this AM. Currently denies lightheadedness, blurry vision, muscle weakness, chest pain, palpitations, nausea, vomiting, and abdominal pain. BP measured at bedside 126/80, SpO2 92 to 95% on room air.    PAST MEDICAL & SURGICAL HISTORY:  Atrial tachycardia    Ventricular septal defect    History of orthostatic hypotension    Single kidney    S/p nephrectomy  donated kidney- left 1984        FAMILY HISTORY:  Family history of prostate cancer (Father)        SOCIAL HISTORY:  Smoking: __ packs x ___ years  EtOH Use:  Marital Status:  Occupation:  Recent Travel:  Country of Birth:  Advance Directives:    Allergies    No Known Allergies    Intolerances        HOME MEDICATIONS:    REVIEW OF SYSTEMS:    CONSTITUTIONAL: No weakness, fevers or chills  EYES/ENT: No visual changes;  No vertigo or throat pain   NECK: No pain or stiffness  RESPIRATORY: +dyspnea after talking for more than 5 minutes. No cough, wheezing, hemoptysis  CARDIOVASCULAR: No chest pain or palpitations  GASTROINTESTINAL: No abdominal or epigastric pain. No nausea, vomiting, or hematemesis; No diarrhea or constipation. No melena or hematochezia.  GENITOURINARY: No dysuria, frequency or hematuria  NEUROLOGICAL: No numbness or weakness  SKIN: No itching, burning, rashes, or lesions   All other review of systems is negative unless indicated above.    OBJECTIVE:  ICU Vital Signs Last 24 Hrs  T(C): 36.8 (30 Oct 2020 05:15), Max: 36.8 (29 Oct 2020 23:30)  T(F): 98.2 (30 Oct 2020 05:15), Max: 98.3 (29 Oct 2020 23:30)  HR: 73 (30 Oct 2020 05:15) (73 - 86)  BP: 116/72 (30 Oct 2020 05:15) (114/74 - 149/83)  BP(mean): 105 (29 Oct 2020 14:27) (105 - 105)  ABP: --  ABP(mean): --  RR: 19 (30 Oct 2020 05:15) (16 - 26)  SpO2: 96% (30 Oct 2020 05:15) (93% - 97%)        10-29 @ 07:01  -  10-30 @ 07:00  --------------------------------------------------------  IN: 236 mL / OUT: 250 mL / NET: -14 mL      CAPILLARY BLOOD GLUCOSE          PHYSICAL EXAM:  GENERAL: No acute distress, currently not on supplemental O2  HEAD:  Atraumatic, Normocephalic  CHEST/LUNG: Decreased breath sounds in right lung fields  HEART: Regular rate and rhythm. Normal S1/S2. No murmurs, rubs, or gallops  ABDOMEN: Soft, non-tender, non-distended; normal bowel sounds, no organomegaly  EXTREMITIES: Non-pitting edema of b/l LE. 2+ peripheral pulses b/l, No clubbing, cyanosis.  NEUROLOGY: A&O x 3, no focal deficits  SKIN: No rashes or lesions    HOSPITAL MEDICATIONS:  MEDICATIONS  (STANDING):  heparin  Infusion.  Unit(s)/Hr (16 mL/Hr) IV Continuous <Continuous>  influenza   Vaccine 0.5 milliLiter(s) IntraMuscular once    MEDICATIONS  (PRN):  heparin   Injectable 7000 Unit(s) IV Push every 6 hours PRN For aPTT less than 40  heparin   Injectable 3500 Unit(s) IV Push every 6 hours PRN For aPTT between 40 - 57      LABS:                        11.9   9.46  )-----------( 178      ( 30 Oct 2020 10:15 )             37.3     10-30    140  |  105  |  26<H>  ----------------------------<  174<H>  3.9   |  22  |  1.17    Ca    8.6      30 Oct 2020 02:41  Mg     2.1     10-30    TPro  7.0  /  Alb  3.9  /  TBili  0.7  /  DBili  x   /  AST  26  /  ALT  43<H>  /  AlkPhos  77  10-30    PTT - ( 30 Oct 2020 09:23 )  PTT:159.3 SEC  Urinalysis Basic - ( 30 Oct 2020 02:40 )    Color: LIGHT YELLOW / Appearance: CLEAR / SG: > 1.040 / pH: 5.5  Gluc: NEGATIVE / Ketone: NEGATIVE  / Bili: NEGATIVE / Urobili: NORMAL   Blood: NEGATIVE / Protein: NEGATIVE / Nitrite: NEGATIVE   Leuk Esterase: MODERATE / RBC: 0-2 / WBC 11-25   Sq Epi: OCC / Non Sq Epi: x / Bacteria: MANY            MICROBIOLOGY:     RADIOLOGY:  [ ] Reviewed and interpreted by me    EKG: CHIEF COMPLAINT:    HPI:  65F AT s/p failed ablation (not performed in 2015), positive tilt table/orthostatic hypotension, VSD, non-obstructive CAD, PVCs, chronic palpitations, renal donor (s/p L nephrectomy for son) presenting from PST for palpitations and SOB at rest. Pt initially started having palpitations 5 years ago but was unable to have ablation then. Since patient has on and off palpitations but has been able to perform ADLs. On 10/9 seen by Dr. Quijano and noted to have runs of Atach on holter monitor and plan was for EP study and ablation. Today patient presented to [facility] and noted to have palpitations, SOB at rest over last 6 days and TWI on EKG done. Sent to ED. Per patient she recently visited the DR and had mild SOB prior to this trip 6 days ago which worsened during her stay. ET only about 10 steps but formerly multiple blocks. Denies orthopnea to me (endorsed to other provider). She states SOB is specifically when she gets palpitations which have started to have more in frequency and severity. At this point it is limiting her ADLs. Of note she also had CP left sided, non-radiating 3 days ago occurring overnight. This self resolved on its own and she hasn't had since. Denies hx prior CHF. Endorses dysuria 5 days ago now resolved, but denies fevers, chills, polyuria, foul smelling urine. Also endorses leg swelling b/l but now improved today. She only noticed this yesterday.    MICU consulted for closer monitoring. Patient currently reporting dyspnea after speaking for 5 minutes and a brief episode of right calf pain this AM. Currently denies lightheadedness, blurry vision, muscle weakness, chest pain, palpitations, nausea, vomiting, and abdominal pain. BP measured at bedside 126/80, SpO2 92 to 95% on room air.    PAST MEDICAL & SURGICAL HISTORY:  Atrial tachycardia    Ventricular septal defect    History of orthostatic hypotension    Single kidney    S/p nephrectomy  donated kidney- left 1984        FAMILY HISTORY:  Family history of prostate cancer (Father)        SOCIAL HISTORY:  Smoking: __ packs x ___ years  EtOH Use:  Marital Status:  Occupation:  Recent Travel:  Country of Birth:  Advance Directives:    Allergies    No Known Allergies    Intolerances        HOME MEDICATIONS:    REVIEW OF SYSTEMS:    CONSTITUTIONAL: No weakness, fevers or chills  EYES/ENT: No visual changes;  No vertigo or throat pain   NECK: No pain or stiffness  RESPIRATORY: +dyspnea after talking for more than 5 minutes. No cough, wheezing, hemoptysis  CARDIOVASCULAR: No chest pain or palpitations  GASTROINTESTINAL: No abdominal or epigastric pain. No nausea, vomiting, or hematemesis; No diarrhea or constipation. No melena or hematochezia.  GENITOURINARY: No dysuria, frequency or hematuria  NEUROLOGICAL: No numbness or weakness  SKIN: No itching, burning, rashes, or lesions   All other review of systems is negative unless indicated above.    OBJECTIVE:  ICU Vital Signs Last 24 Hrs  T(C): 36.8 (30 Oct 2020 05:15), Max: 36.8 (29 Oct 2020 23:30)  T(F): 98.2 (30 Oct 2020 05:15), Max: 98.3 (29 Oct 2020 23:30)  HR: 73 (30 Oct 2020 05:15) (73 - 86)  BP: 116/72 (30 Oct 2020 05:15) (114/74 - 149/83)  BP(mean): 105 (29 Oct 2020 14:27) (105 - 105)  ABP: --  ABP(mean): --  RR: 19 (30 Oct 2020 05:15) (16 - 26)  SpO2: 96% (30 Oct 2020 05:15) (93% - 97%)        10-29 @ 07:01  -  10-30 @ 07:00  --------------------------------------------------------  IN: 236 mL / OUT: 250 mL / NET: -14 mL      CAPILLARY BLOOD GLUCOSE          PHYSICAL EXAM:  GENERAL: No acute distress, currently not on supplemental O2  HEAD:  Atraumatic, Normocephalic  CHEST/LUNG: Decreased breath sounds in right lung fields  HEART: Regular rate and rhythm. Normal S1/S2. No murmurs, rubs, or gallops  ABDOMEN: Soft, non-tender, non-distended; normal bowel sounds, no organomegaly  EXTREMITIES: Non-pitting edema of b/l LE. 2+ peripheral pulses b/l, No clubbing, cyanosis.  NEUROLOGY: A&O x 3, no focal deficits  SKIN: No rashes or lesions    HOSPITAL MEDICATIONS:  MEDICATIONS  (STANDING):  heparin  Infusion.  Unit(s)/Hr (16 mL/Hr) IV Continuous <Continuous>  influenza   Vaccine 0.5 milliLiter(s) IntraMuscular once    MEDICATIONS  (PRN):  heparin   Injectable 7000 Unit(s) IV Push every 6 hours PRN For aPTT less than 40  heparin   Injectable 3500 Unit(s) IV Push every 6 hours PRN For aPTT between 40 - 57      LABS:                        11.9   9.46  )-----------( 178      ( 30 Oct 2020 10:15 )             37.3     10-30    140  |  105  |  26<H>  ----------------------------<  174<H>  3.9   |  22  |  1.17    Ca    8.6      30 Oct 2020 02:41  Mg     2.1     10-30    TPro  7.0  /  Alb  3.9  /  TBili  0.7  /  DBili  x   /  AST  26  /  ALT  43<H>  /  AlkPhos  77  10-30    PTT - ( 30 Oct 2020 09:23 )  PTT:159.3 SEC  Urinalysis Basic - ( 30 Oct 2020 02:40 )    Color: LIGHT YELLOW / Appearance: CLEAR / SG: > 1.040 / pH: 5.5  Gluc: NEGATIVE / Ketone: NEGATIVE  / Bili: NEGATIVE / Urobili: NORMAL   Blood: NEGATIVE / Protein: NEGATIVE / Nitrite: NEGATIVE   Leuk Esterase: MODERATE / RBC: 0-2 / WBC 11-25   Sq Epi: OCC / Non Sq Epi: x / Bacteria: MANY            MICROBIOLOGY:     RADIOLOGY:  [ ] Reviewed and interpreted by me    EKG:

## 2020-10-30 NOTE — CONSULT NOTE ADULT - ASSESSMENT
Patient is a 64 yo F w/ AT (s/p failed ablation 2015), orthostatic hypotension (positive tilt table), ?VSD (not visualized on 9/2020 cardiac MRI), CAD, renal donor (s/p L nephrectomy for son) admitted yesterday from [place] for palpitations and SOB at rest found to have PE in 5 lobes extending proximally on the right from the peripheral main pulmonary artery and proximally on the left from both central lumbar arteries as well as RUL 6mm nodule. Patient started on Heparin gtt. Patient currently saturating well on 2L nc.

## 2020-10-30 NOTE — CONSULT NOTE ADULT - SUBJECTIVE AND OBJECTIVE BOX
Syed Mckay MD  Interventional Cardiology / Endovascular Specialist  [place] Office : [phone] [place] [address] [place] [address]  Tel:   [place] Office : [phone] [facility] [place] [address]  Tel: [phone]  Cell : [phone]    HISTORY OF PRESENTING ILLNESS:  65F with PMHx AT s/p failed ablation (not performed in 2015), positive tilt table/orthostatic hypotension, VSD, non-obstructive CAD, PVCs, chronic palpitations, renal donor (s/p L nephrectomy for son) presenting from PST for palpitations and SOB. Pt initially started having palpitations 5 years ago but was unable to have ablation then. Since patient has on and off palpitations but has been able to perform ADLs. On 10/9 seen by Dr. Quijano and noted to have runs of Atach on holter monitor and plan was for EP study and ablation. Today patient presented to [facility] and noted to have palpitations, SOB after walking 10-15 steps last 6 days and TWI on EKG done. Sent to ED. Per patient she recently visited the DR and had mild SOB prior to this trip 6 days ago which worsened during her stay. ET only about 10 steps but formerly multiple blocks. Denies orthopnea to me (endorsed to other provider). She states she gets SOB and palpitations after walking 10 steps. At this point it is limiting her ADLs. Of note she also had CP left sided, nonradiating 3 days ago occurring overnight. This self resolved on its own and she hasn't had since. Denies hx prior CHF. Endorses dysuria 5 days ago now resolved, but denies fevers, chills, polyuria, foul smelling urine. Also endorses leg swelling b/l but now improved today.   	  MEDICATIONS:  heparin   Injectable 7000 Unit(s) IV Push every 6 hours PRN  heparin   Injectable 3500 Unit(s) IV Push every 6 hours PRN  heparin  Infusion.  Unit(s)/Hr IV Continuous <Continuous>              influenza   Vaccine 0.5 milliLiter(s) IntraMuscular once      PAST MEDICAL/SURGICAL HISTORY  PAST MEDICAL & SURGICAL HISTORY:  Atrial tachycardia    Ventricular septal defect    History of orthostatic hypotension    Single kidney    S/p nephrectomy  donated kidney- left 1984        SOCIAL HISTORY: Substance Use (street drugs): ( x ) never used  (  ) other:    FAMILY HISTORY:  Family history of prostate cancer (Father)        REVIEW OF SYSTEMS:  CONSTITUTIONAL: No fever, weight loss, or fatigue  EYES: No eye pain, visual disturbances, or discharge  ENMT:  No difficulty hearing, tinnitus, vertigo; No sinus or throat pain  BREASTS: No pain, masses, or nipple discharge  GASTROINTESTINAL: No abdominal or epigastric pain. No nausea, vomiting, or hematemesis; No diarrhea or constipation. No melena or hematochezia.  GENITOURINARY: No dysuria, frequency, hematuria, or incontinence  NEUROLOGICAL: No headaches, memory loss, loss of strength, numbness, or tremors  ENDOCRINE: No heat or cold intolerance; No hair loss  MUSCULOSKELETAL: No joint pain or swelling; No muscle, back, or extremity pain  PSYCHIATRIC: No depression, anxiety, mood swings, or difficulty sleeping  HEME/LYMPH: No easy bruising, or bleeding gums  All others negative    PHYSICAL EXAM:  T(C): 36.7 (10-30-20 @ 11:51), Max: 36.8 (10-29-20 @ 23:30)  HR: 74 (10-30-20 @ 11:51) (73 - 86)  BP: 116/68 (10-30-20 @ 11:51) (114/74 - 129/85)  RR: 19 (10-30-20 @ 11:51) (16 - 21)  SpO2: 95% (10-30-20 @ 11:51) (93% - 96%)  Wt(kg): --  I&O's Summary    29 Oct 2020 07:01  -  30 Oct 2020 07:00  --------------------------------------------------------  IN: 236 mL / OUT: 250 mL / NET: -14 mL      Height (cm): 170.2 (10-30 @ 00:46), 170.2 (10-29 @ 15:34)  Weight (kg): 89.1 (10-30 @ 00:46), 83 (10-29 @ 15:34)  BMI (kg/m2): 30.8 (10-30 @ 00:46), 28.7 (10-29 @ 15:34)  BSA (m2): 2.01 (10-30 @ 00:46), 1.95 (10-29 @ 15:34)    GENERAL: NAD, well-groomed, well-developed  EYES: EOMI, PERRLA, conjunctiva and sclera clear  ENMT: No tonsillar erythema, exudates, or enlargement  Cardiovascular: Normal S1 S2, No JVD, No murmurs, No edema  Respiratory: Lungs clear to auscultation	  Gastrointestinal:  Soft, Non-tender, + BS	  Extremities: Normal range of motion, No clubbing, cyanosis or edema  LYMPH: No lymphadenopathy noted  NERVOUS SYSTEM:  Alert & Oriented X3                                    11.9   9.46  )-----------( 178      ( 30 Oct 2020 10:15 )             37.3     10-30    140  |  105  |  26<H>  ----------------------------<  174<H>  3.9   |  22  |  1.17    Ca    8.6      30 Oct 2020 02:41  Mg     2.1     10-30    TPro  7.0  /  Alb  3.9  /  TBili  0.7  /  DBili  x   /  AST  26  /  ALT  43<H>  /  AlkPhos  77  10-30    proBNP: Serum Pro-Brain Natriuretic Peptide: 2202 pg/mL (10-29 @ 17:30)    Lipid Profile:   HgA1c:   TSH: Thyroid Stimulating Hormone, Serum: 1.06 uIU/mL (10-29 @ 17:30)      Consultant(s) Notes Reviewed:  [x ] YES  [ ] NO    Care Discussed with Consultants/Other Providers [ x] YES  [ ] NO    Imaging Personally Reviewed independently:  [x] YES  [ ] NO    All labs, radiologic studies, vitals, orders and medications list reviewed. Patient is seen and examined at bedside. Case discussed with medical team.

## 2020-10-30 NOTE — CONSULT NOTE ADULT - SUBJECTIVE AND OBJECTIVE BOX
CHIEF COMPLAINT:    HPI: Patient is a 66 yo F w/ AT (s/p failed ablation 2015), orthostatic hypotension (positive tilt table), ?VSD (not visualized on 9/2020 cardiac MRI), CAD, renal donor (s/p L nephrectomy for son) admitted yesterday from PST for palpitations and SOB at rest. Patient was planned for outpatient EP study after noted to have runs of AT on holter but found to have palpitations, SOB and TWI on EKG.     Labs notable for Trops 27 and then 26, elevated BNP 2202. CTA showed PE in all 5 lobes extending proximally on the right from the peripheral main pulmonary artery and proximally on the left from both central lumbar arteries as well as RUL 6mm nodule. Patient started on Heparin gtt. Patient currently saturating well on 2L nc.     PAST MEDICAL & SURGICAL HISTORY:  Atrial tachycardia    Ventricular septal defect    History of orthostatic hypotension    Single kidney    S/p nephrectomy  donated kidney- left 1984        FAMILY HISTORY:  Family history of prostate cancer (Father)        SOCIAL HISTORY:  Smoking: [ ] Never Smoked [ ] Former Smoker (__ packs x ___ years) [ ] Current Smoker  (__ packs x ___ years)  Substance Use: [ ] Never Used [ ] Used ____  EtOH Use:  Marital Status: [ ] Single [ ]  [ ]  [ ]   Sexual History:   Occupation:  Recent Travel:  Country of Birth:  Advance Directives:    Allergies    No Known Allergies    Intolerances        HOME MEDICATIONS:  Home Medications:  metoprolol tartrate 25 mg oral tablet: 1 tab(s) orally once a day (at bedtime), last dose 10/24/2020 (29 Oct 2020 21:03)      REVIEW OF SYSTEMS:  Constitutional: [ ] negative [ ] fevers [ ] chills [ ] weight loss [ ] weight gain  HEENT: [ ] negative [ ] dry eyes [ ] eye irritation [ ] postnasal drip [ ] nasal congestion  CV: [ ] negative  [ ] chest pain [ ] orthopnea [ ] palpitations [ ] murmur  Resp: [ ] negative [ ] cough [ ] shortness of breath [ ] dyspnea [ ] wheezing [ ] sputum [ ] hemoptysis  GI: [ ] negative [ ] nausea [ ] vomiting [ ] diarrhea [ ] constipation [ ] abd pain [ ] dysphagia   : [ ] negative [ ] dysuria [ ] nocturia [ ] hematuria [ ] increased urinary frequency  Musculoskeletal: [ ] negative [ ] back pain [ ] myalgias [ ] arthralgias [ ] fracture  Skin: [ ] negative [ ] rash [ ] itch  Neurological: [ ] negative [ ] headache [ ] dizziness [ ] syncope [ ] weakness [ ] numbness  Psychiatric: [ ] negative [ ] anxiety [ ] depression  Endocrine: [ ] negative [ ] diabetes [ ] thyroid problem  Hematologic/Lymphatic: [ ] negative [ ] anemia [ ] bleeding problem  Allergic/Immunologic: [ ] negative [ ] itchy eyes [ ] nasal discharge [ ] hives [ ] angioedema  [ ] All other systems negative  [ ] Unable to assess ROS because ________    OBJECTIVE:  ICU Vital Signs Last 24 Hrs  T(C): 36.7 (30 Oct 2020 11:51), Max: 36.8 (29 Oct 2020 23:30)  T(F): 98 (30 Oct 2020 11:51), Max: 98.3 (29 Oct 2020 23:30)  HR: 74 (30 Oct 2020 11:51) (73 - 86)  BP: 116/68 (30 Oct 2020 11:51) (114/74 - 149/83)  BP(mean): 105 (29 Oct 2020 14:27) (105 - 105)  ABP: --  ABP(mean): --  RR: 19 (30 Oct 2020 11:51) (16 - 26)  SpO2: 95% (30 Oct 2020 11:51) (93% - 97%)        10-29 @ 07:01  -  10-30 @ 07:00  --------------------------------------------------------  IN: 236 mL / OUT: 250 mL / NET: -14 mL      CAPILLARY BLOOD GLUCOSE          PHYSICAL EXAM:  General:   HEENT:   Lymph Nodes:  Neck:   Respiratory:   Cardiovascular:   Abdomen:   Extremities:   Skin:   Neurological:  Psychiatry:    LINES:     HOSPITAL MEDICATIONS:  Standing Meds:  heparin  Infusion.  Unit(s)/Hr IV Continuous <Continuous>  influenza   Vaccine 0.5 milliLiter(s) IntraMuscular once      PRN Meds:  heparin   Injectable 7000 Unit(s) IV Push every 6 hours PRN  heparin   Injectable 3500 Unit(s) IV Push every 6 hours PRN      LABS:                        11.9   9.46  )-----------( 178      ( 30 Oct 2020 10:15 )             37.3     Hgb Trend: 11.9<--, 12.0<--, 11.7<--  10-30    140  |  105  |  26<H>  ----------------------------<  174<H>  3.9   |  22  |  1.17    Ca    8.6      30 Oct 2020 02:41  Mg     2.1     10-30    TPro  7.0  /  Alb  3.9  /  TBili  0.7  /  DBili  x   /  AST  26  /  ALT  43<H>  /  AlkPhos  77  10-30    Creatinine Trend: 1.17<--, 1.11<--, 1.14<--  PTT - ( 30 Oct 2020 09:23 )  PTT:159.3 SEC  Urinalysis Basic - ( 30 Oct 2020 02:40 )    Color: LIGHT YELLOW / Appearance: CLEAR / SG: > 1.040 / pH: 5.5  Gluc: NEGATIVE / Ketone: NEGATIVE  / Bili: NEGATIVE / Urobili: NORMAL   Blood: NEGATIVE / Protein: NEGATIVE / Nitrite: NEGATIVE   Leuk Esterase: MODERATE / RBC: 0-2 / WBC 11-25   Sq Epi: OCC / Non Sq Epi: x / Bacteria: MANY            MICROBIOLOGY:       RADIOLOGY:  [ ] Reviewed and interpreted by me    PULMONARY FUNCTION TESTS:    EKG:   CHIEF COMPLAINT:    HPI: Patient is a 66 yo F w/ AT (s/p failed ablation 2015), orthostatic hypotension (positive tilt table), ?VSD (not visualized on 9/2020 cardiac MRI), CAD, renal donor (s/p L nephrectomy for son) admitted yesterday from PST for palpitations and SOB at rest. As per patient she has had intermittent palpitations chronically and was planned for outpatient EP study after noted to have runs of AT on holter on day of admission. Patient was admitted after being found to have palpitations, SOB and TWI on EKG at [facility]. Patient endorses thinking that her increased palpitations this time began around 10/22 but attributed to her chronic issue. Of note, she left on a 3 hour flight to [place] on 10/22 and returned to [facility] on 10/28. Also endorses onset on RLE discomfort which began while in [place]. Unclear if palpitations and chest discomfort started before or after flight to .     Labs notable for Trops 27 and then 26, elevated BNP 2202. CTA showed PE in all 5 lobes extending proximally on the right from the peripheral main pulmonary artery and proximally on the left from both central lumbar arteries as well as RUL 6mm nodule. Patient started on Heparin gtt. Patient currently saturating well on 2L nc.     Endorses up to date with mammograms. Had a colonoscopy a log time ago, unsure of findings. Denies any unintentional weight loss, family history of blood clots, or malignancy.     PAST MEDICAL & SURGICAL HISTORY:  Atrial tachycardia    Ventricular septal defect    History of orthostatic hypotension    Single kidney    S/p nephrectomy  donated kidney- left 1984        FAMILY HISTORY:  Family history of prostate cancer (Father)        SOCIAL HISTORY:  Smoking: [X] Never Smoked [ ] Former Smoker (__ packs x ___ years) [ ] Current Smoker  (__ packs x ___ years)  Substance Use: [X] Never Used [ ] Used ____  EtOH Use:  Marital Status: [ ] Single [ ]  [ ]  [ ]   Sexual History:   Occupation: Clerical work  Recent Travel:  Country of Birth:  Advance Directives:    Allergies    No Known Allergies    Intolerances        HOME MEDICATIONS:  Home Medications:  metoprolol tartrate 25 mg oral tablet: 1 tab(s) orally once a day (at bedtime), last dose 10/24/2020 (29 Oct 2020 21:03)      REVIEW OF SYSTEMS:  Constitutional: [ ] negative [ ] fevers [ ] chills [ ] weight loss [ ] weight gain  HEENT: [ ] negative [ ] dry eyes [ ] eye irritation [ ] postnasal drip [ ] nasal congestion  CV: [ ] negative  [X] chest pain [ ] orthopnea [ ] palpitations [ ] murmur  Resp: [ ] negative [ ] cough [X] shortness of breath [X] dyspnea [ ] wheezing [ ] sputum [ ] hemoptysis  GI: [ ] negative [ ] nausea [ ] vomiting [ ] diarrhea [ ] constipation [ ] abd pain [ ] dysphagia   : [ ] negative [ ] dysuria [ ] nocturia [ ] hematuria [ ] increased urinary frequency  Musculoskeletal: [ ] negative [ ] back pain [ ] myalgias [ ] arthralgias [ ] fracture  Skin: [ ] negative [ ] rash [ ] itch  Neurological: [ ] negative [ ] headache [ ] dizziness [ ] syncope [ ] weakness [ ] numbness  Psychiatric: [ ] negative [ ] anxiety [ ] depression  Endocrine: [ ] negative [ ] diabetes [ ] thyroid problem  Hematologic/Lymphatic: [ ] negative [ ] anemia [ ] bleeding problem  Allergic/Immunologic: [ ] negative [ ] itchy eyes [ ] nasal discharge [ ] hives [ ] angioedema  [X] All other systems negative  [ ] Unable to assess ROS because ________    OBJECTIVE:  ICU Vital Signs Last 24 Hrs  T(C): 36.7 (30 Oct 2020 11:51), Max: 36.8 (29 Oct 2020 23:30)  T(F): 98 (30 Oct 2020 11:51), Max: 98.3 (29 Oct 2020 23:30)  HR: 74 (30 Oct 2020 11:51) (73 - 86)  BP: 116/68 (30 Oct 2020 11:51) (114/74 - 149/83)  BP(mean): 105 (29 Oct 2020 14:27) (105 - 105)  ABP: --  ABP(mean): --  RR: 19 (30 Oct 2020 11:51) (16 - 26)  SpO2: 95% (30 Oct 2020 11:51) (93% - 97%)        10-29 @ 07:01  -  10-30 @ 07:00  --------------------------------------------------------  IN: 236 mL / OUT: 250 mL / NET: -14 mL      CAPILLARY BLOOD GLUCOSE          PHYSICAL EXAM:  General: Obese, NAD  HEENT: EOMI, PERRLA  Respiratory: CTAB  Cardiovascular: S1S2, RRR  Abdomen: Soft, NTND, BS+  Extremities: No peripheral edema  Skin: No rash    LINES:     HOSPITAL MEDICATIONS:  Standing Meds:  heparin  Infusion.  Unit(s)/Hr IV Continuous <Continuous>  influenza   Vaccine 0.5 milliLiter(s) IntraMuscular once      PRN Meds:  heparin   Injectable 7000 Unit(s) IV Push every 6 hours PRN  heparin   Injectable 3500 Unit(s) IV Push every 6 hours PRN      LABS:                        11.9   9.46  )-----------( 178      ( 30 Oct 2020 10:15 )             37.3     Hgb Trend: 11.9<--, 12.0<--, 11.7<--  10-30    140  |  105  |  26<H>  ----------------------------<  174<H>  3.9   |  22  |  1.17    Ca    8.6      30 Oct 2020 02:41  Mg     2.1     10-30    TPro  7.0  /  Alb  3.9  /  TBili  0.7  /  DBili  x   /  AST  26  /  ALT  43<H>  /  AlkPhos  77  10-30    Creatinine Trend: 1.17<--, 1.11<--, 1.14<--  PTT - ( 30 Oct 2020 09:23 )  PTT:159.3 SEC  Urinalysis Basic - ( 30 Oct 2020 02:40 )    Color: LIGHT YELLOW / Appearance: CLEAR / SG: > 1.040 / pH: 5.5  Gluc: NEGATIVE / Ketone: NEGATIVE  / Bili: NEGATIVE / Urobili: NORMAL   Blood: NEGATIVE / Protein: NEGATIVE / Nitrite: NEGATIVE   Leuk Esterase: MODERATE / RBC: 0-2 / WBC 11-25   Sq Epi: OCC / Non Sq Epi: x / Bacteria: MANY            MICROBIOLOGY:       RADIOLOGY:  [ ] Reviewed and interpreted by me    PULMONARY FUNCTION TESTS:    EKG:

## 2020-10-31 LAB
ANION GAP SERPL CALC-SCNC: 11 MMO/L — SIGNIFICANT CHANGE UP (ref 7–14)
APTT BLD: 72 SEC — HIGH (ref 27–36.3)
BUN SERPL-MCNC: 26 MG/DL — HIGH (ref 7–23)
CALCIUM SERPL-MCNC: 8.5 MG/DL — SIGNIFICANT CHANGE UP (ref 8.4–10.5)
CHLORIDE SERPL-SCNC: 107 MMOL/L — SIGNIFICANT CHANGE UP (ref 98–107)
CO2 SERPL-SCNC: 19 MMOL/L — LOW (ref 22–31)
CREAT SERPL-MCNC: 1.16 MG/DL — SIGNIFICANT CHANGE UP (ref 0.5–1.3)
GLUCOSE SERPL-MCNC: 114 MG/DL — HIGH (ref 70–99)
HCT VFR BLD CALC: 37.7 % — SIGNIFICANT CHANGE UP (ref 34.5–45)
HGB BLD-MCNC: 11.5 G/DL — SIGNIFICANT CHANGE UP (ref 11.5–15.5)
MAGNESIUM SERPL-MCNC: 2.2 MG/DL — SIGNIFICANT CHANGE UP (ref 1.6–2.6)
MCHC RBC-ENTMCNC: 26.4 PG — LOW (ref 27–34)
MCHC RBC-ENTMCNC: 30.5 % — LOW (ref 32–36)
MCV RBC AUTO: 86.5 FL — SIGNIFICANT CHANGE UP (ref 80–100)
NRBC # FLD: 0 K/UL — SIGNIFICANT CHANGE UP (ref 0–0)
PHOSPHATE SERPL-MCNC: 4.1 MG/DL — SIGNIFICANT CHANGE UP (ref 2.5–4.5)
PLATELET # BLD AUTO: 182 K/UL — SIGNIFICANT CHANGE UP (ref 150–400)
PMV BLD: 12.2 FL — SIGNIFICANT CHANGE UP (ref 7–13)
POTASSIUM SERPL-MCNC: 4.4 MMOL/L — SIGNIFICANT CHANGE UP (ref 3.5–5.3)
POTASSIUM SERPL-SCNC: 4.4 MMOL/L — SIGNIFICANT CHANGE UP (ref 3.5–5.3)
RBC # BLD: 4.36 M/UL — SIGNIFICANT CHANGE UP (ref 3.8–5.2)
RBC # FLD: 14.8 % — HIGH (ref 10.3–14.5)
SODIUM SERPL-SCNC: 137 MMOL/L — SIGNIFICANT CHANGE UP (ref 135–145)
WBC # BLD: 8.75 K/UL — SIGNIFICANT CHANGE UP (ref 3.8–10.5)
WBC # FLD AUTO: 8.75 K/UL — SIGNIFICANT CHANGE UP (ref 3.8–10.5)

## 2020-10-31 PROCEDURE — 99231 SBSQ HOSP IP/OBS SF/LOW 25: CPT

## 2020-10-31 RX ORDER — ACETAMINOPHEN 500 MG
650 TABLET ORAL EVERY 6 HOURS
Refills: 0 | Status: DISCONTINUED | OUTPATIENT
Start: 2020-10-31 | End: 2020-11-06

## 2020-10-31 RX ADMIN — Medication 650 MILLIGRAM(S): at 22:49

## 2020-10-31 RX ADMIN — Medication 650 MILLIGRAM(S): at 21:49

## 2020-10-31 RX ADMIN — Medication 650 MILLIGRAM(S): at 13:27

## 2020-10-31 RX ADMIN — HEPARIN SODIUM 1300 UNIT(S)/HR: 5000 INJECTION INTRAVENOUS; SUBCUTANEOUS at 01:05

## 2020-10-31 RX ADMIN — Medication 650 MILLIGRAM(S): at 12:27

## 2020-10-31 NOTE — PROVIDER CONTACT NOTE (OTHER) - SITUATION
Patient complaining of chest tightness, SOB, cough and palpitation. patient is A&OX4. VS within patient defined limits. pt denies any other symptoms.

## 2020-10-31 NOTE — PROGRESS NOTE ADULT - ASSESSMENT
Assessment and Plan    65F with PMHx AT s/p failed ablation (not performed in 2015), positive tilt table/orthostatic hypotension, VSD, non-obstructive CAD, PVCs, chronic palpitations, renal donor (s/p L nephrectomy for son) presenting from [place] for palpitations and SOB with exertion    EKG: NSR with twave inversions in leads AVF, III, V3-V6  Tele: NSR 60-70s    1. SOB  -patient states has SOB on exertion for past week  -CTA chest with extensive PE in all 5 lobes. on hep gtt. f/u MICU consult appreciated   -currently hemodynamically stable and not requiring oxygen at rest  -right heart strain noted on CTA chest, echo with Dilated RV and Decreased RV function , No troponin elevation , RV normal on MRI on 9/23/2020   -LE dopplers with acute Rt peroneal and PT vein DVT   -c/w heparin , discussed catheter directed lytics with family and Dr Nhung ruiz, given hemodynamic stability and Pt not hypoxic will hold off for now   - Will consider Digoxin if increased Atrial Tac burden and to help with RV contractility   - will get heme eval for hypercoagulable work up      2. Palpitations  -patient diagnosed with AT and was getting workup for outpatient ablation  -f/u EP    3. Transaminitis  -patient with elevated LFTs  -denies abdominal pain  -abd US negative   Assessment and Plan    65F with PMHx AT s/p failed ablation (not performed in 2015), positive tilt table/orthostatic hypotension, VSD, non-obstructive CAD, PVCs, chronic palpitations, renal donor (s/p L nephrectomy for son) presenting from [place] for palpitations and SOB with exertion    EKG: NSR with twave inversions in leads AVF, III, V3-V6  Tele: NSR 60-70s    1. SOB  -patient states has SOB on exertion for past week  -CTA chest with extensive PE in all 5 lobes. on hep gtt. f/u MICU consult appreciated   -currently hemodynamically stable and not requiring oxygen at rest  -right heart strain noted on CTA chest, echo with Dilated RV and Decreased RV function , No troponin elevation , RV normal on MRI on 9/23/2020   -LE dopplers with acute Rt peroneal and PT vein DVT   -c/w heparin , discussed catheter directed lytics with family and Dr Nhung ruiz, given hemodynamic stability and Pt not hypoxic will hold off for now   - will get heme eval for hypercoagulable work up      2. Palpitations  -patient diagnosed with AT and was getting workup for outpatient ablation  -f/u EP    3. Transaminitis  -patient with elevated LFTs  -denies abdominal pain  -abd US negative

## 2020-10-31 NOTE — PROGRESS NOTE ADULT - SUBJECTIVE AND OBJECTIVE BOX
Syed Mckay MD  Interventional Cardiology / Endovascular Specialist  [place] Office : [phone] [place] [address] [place] [address]  Tel:   [place] Office : [phone] [facility] [place] [address]  Tel: [phone]  Cell : [phone]      Subjective/Overnight events: Patient seen sitting up in bed. Currently not having chest pain. Continues to have SOB on exertion    MEDICATIONS:  heparin   Injectable 7000 Unit(s) IV Push every 6 hours PRN  heparin   Injectable 3500 Unit(s) IV Push every 6 hours PRN  heparin  Infusion.  Unit(s)/Hr IV Continuous <Continuous>        acetaminophen   Tablet .. 650 milliGRAM(s) Oral every 6 hours PRN        influenza   Vaccine 0.5 milliLiter(s) IntraMuscular once      PAST MEDICAL/SURGICAL HISTORY  PAST MEDICAL & SURGICAL HISTORY:  Atrial tachycardia    Ventricular septal defect    History of orthostatic hypotension    Single kidney    S/p nephrectomy  donated kidney- left 1984        SOCIAL HISTORY: Substance Use (street drugs): ( x ) never used  (  ) other:    FAMILY HISTORY:  Family history of prostate cancer (Father)        REVIEW OF SYSTEMS:  CONSTITUTIONAL: No fever, weight loss, or fatigue  EYES: No eye pain, visual disturbances, or discharge  ENMT:  No difficulty hearing, tinnitus, vertigo; No sinus or throat pain  BREASTS: No pain, masses, or nipple discharge  GASTROINTESTINAL: No abdominal or epigastric pain. No nausea, vomiting, or hematemesis; No diarrhea or constipation. No melena or hematochezia.  GENITOURINARY: No dysuria, frequency, hematuria, or incontinence  NEUROLOGICAL: No headaches, memory loss, loss of strength, numbness, or tremors  ENDOCRINE: No heat or cold intolerance; No hair loss  MUSCULOSKELETAL: No joint pain or swelling; No muscle, back, or extremity pain  PSYCHIATRIC: No depression, anxiety, mood swings, or difficulty sleeping  HEME/LYMPH: No easy bruising, or bleeding gums  All others negative    PHYSICAL EXAM:  T(C): 36.4 (10-31-20 @ 11:26), Max: 37 (10-30-20 @ 20:48)  HR: 74 (10-31-20 @ 11:26) (72 - 87)  BP: 129/79 (10-31-20 @ 11:26) (116/61 - 138/66)  RR: 18 (10-31-20 @ 11:26) (18 - 19)  SpO2: 97% (10-31-20 @ 11:26) (94% - 97%)  Wt(kg): --  I&O's Summary    30 Oct 2020 07:01  -  31 Oct 2020 07:00  --------------------------------------------------------  IN: 1646 mL / OUT: 2100 mL / NET: -454 mL          GENERAL: NAD, well-groomed, well-developed  EYES: EOMI, PERRLA, conjunctiva and sclera clear  ENMT: No tonsillar erythema, exudates, or enlargement  Cardiovascular: Normal S1 S2, No JVD, No murmurs, No edema  Respiratory: Lungs diminished to auscultation	  Gastrointestinal:  Soft, Non-tender, + BS	  Extremities: Normal range of motion, No clubbing, cyanosis or edema  LYMPH: No lymphadenopathy noted  NERVOUS SYSTEM:  Alert & Oriented X3                                    11.5   8.75  )-----------( 182      ( 31 Oct 2020 05:45 )             37.7     10-31    137  |  107  |  26<H>  ----------------------------<  114<H>  4.4   |  19<L>  |  1.16    Ca    8.5      31 Oct 2020 05:45  Phos  4.1     10-31  Mg     2.2     10-31    TPro  7.0  /  Alb  3.9  /  TBili  0.7  /  DBili  x   /  AST  26  /  ALT  43<H>  /  AlkPhos  77  10-30    proBNP:   Lipid Profile:   HgA1c:   TSH:     Consultant(s) Notes Reviewed:  [x ] YES  [ ] NO    Care Discussed with Consultants/Other Providers [ x] YES  [ ] NO    Imaging Personally Reviewed independently:  [x] YES  [ ] NO    All labs, radiologic studies, vitals, orders and medications list reviewed. Patient is seen and examined at bedside. Case discussed with medical team.

## 2020-10-31 NOTE — PROVIDER CONTACT NOTE (OTHER) - BACKGROUND
pt 65 yrs old female came in on 10/29, admitted with SOB, pt found to have PA in all 5 lobes, pt on hep drip @13ml/hr. pt also positive for acute DVT. pt on bedrest.

## 2020-10-31 NOTE — PROGRESS NOTE ADULT - ASSESSMENT
65F with PMHx AT s/p failed ablation (not performed in 2015), positive tilt table/orthostatic hypotension, VSD, non-obstructive CAD, PVCs, chronic palpitations, renal donor (s/p L nephrectomy for son) presenting from PST for palpitations and SOB at rest.     Problem/Plan - 1:  ·  Problem: Shortness of breath.  Plan:telemonitor  cards and pulmonary fu     Problem/Plan - 2:  ·  Problem: Palpitations.  Plan: Was planned for ablation on 11/3 and instructed to hold metoprolol 25mg qhs for ten days prior  -f/u EP recs    Problem/Plan - 3:  ·  Problem: Chest pain, unspecified type.  Plan: 2 episodes of chest pain overnight a few days ago, stabbing in nature, intermittent and left sided. Atypical in nature. Trop 27--26  Ischemia thomas as per cards     Problem/Plan - 4:  ·  Problem: Leg swelling.  Plan: No pitting edema appreciated on exam at this time though patient with RLE calf tendernes and reported edema day prior. She states was pitting  -LE duplex.     Problem/Plan - 5:  ·  Problem: Transaminitis.  Plan: AST 45 ALT 53 and normal one year ago. This may be related to congestive hepatopathy if with CHF though unclear at this time  fu LFTs    Problem/Plan - 6:  Problem: Need for prophylactic measure. Plan: Lovenox  Regular diet.

## 2020-10-31 NOTE — PROGRESS NOTE ADULT - SUBJECTIVE AND OBJECTIVE BOX
Patient is a 65y old  Female who presents with a chief complaint of SOB and palpitations (31 Oct 2020 13:56)      INTERVAL HPI/OVERNIGHT EVENTS:  T(C): 36.4 (10-31-20 @ 11:26), Max: 37 (10-30-20 @ 20:48)  HR: 74 (10-31-20 @ 11:26) (72 - 87)  BP: 129/79 (10-31-20 @ 11:26) (116/61 - 138/66)  RR: 18 (10-31-20 @ 11:26) (18 - 19)  SpO2: 97% (10-31-20 @ 11:26) (94% - 97%)  Wt(kg): --  I&O's Summary    30 Oct 2020 07:01  -  31 Oct 2020 07:00  --------------------------------------------------------  IN: 1646 mL / OUT: 2100 mL / NET: -454 mL        LABS:                        11.5   8.75  )-----------( 182      ( 31 Oct 2020 05:45 )             37.7     10-31    137  |  107  |  26<H>  ----------------------------<  114<H>  4.4   |  19<L>  |  1.16    Ca    8.5      31 Oct 2020 05:45  Phos  4.1     10-31  Mg     2.2     10-31    TPro  7.0  /  Alb  3.9  /  TBili  0.7  /  DBili  x   /  AST  26  /  ALT  43<H>  /  AlkPhos  77  10-30    PTT - ( 31 Oct 2020 00:15 )  PTT:72.0 SEC  Urinalysis Basic - ( 30 Oct 2020 02:40 )    Color: LIGHT YELLOW / Appearance: CLEAR / SG: > 1.040 / pH: 5.5  Gluc: NEGATIVE / Ketone: NEGATIVE  / Bili: NEGATIVE / Urobili: NORMAL   Blood: NEGATIVE / Protein: NEGATIVE / Nitrite: NEGATIVE   Leuk Esterase: MODERATE / RBC: 0-2 / WBC 11-25   Sq Epi: OCC / Non Sq Epi: x / Bacteria: MANY      CAPILLARY BLOOD GLUCOSE            Urinalysis Basic - ( 30 Oct 2020 02:40 )    Color: LIGHT YELLOW / Appearance: CLEAR / SG: > 1.040 / pH: 5.5  Gluc: NEGATIVE / Ketone: NEGATIVE  / Bili: NEGATIVE / Urobili: NORMAL   Blood: NEGATIVE / Protein: NEGATIVE / Nitrite: NEGATIVE   Leuk Esterase: MODERATE / RBC: 0-2 / WBC 11-25   Sq Epi: OCC / Non Sq Epi: x / Bacteria: MANY        MEDICATIONS  (STANDING):  heparin  Infusion.  Unit(s)/Hr (16 mL/Hr) IV Continuous <Continuous>  influenza   Vaccine 0.5 milliLiter(s) IntraMuscular once    MEDICATIONS  (PRN):  acetaminophen   Tablet .. 650 milliGRAM(s) Oral every 6 hours PRN Moderate Pain (4 - 6)  heparin   Injectable 7000 Unit(s) IV Push every 6 hours PRN For aPTT less than 40  heparin   Injectable 3500 Unit(s) IV Push every 6 hours PRN For aPTT between 40 - 57          PHYSICAL EXAM:  GENERAL: NAD, well-groomed, well-developed  HEAD:  Atraumatic, Normocephalic  CHEST/LUNG: Clear to percussion bilaterally; No rales, rhonchi, wheezing, or rubs  HEART: Regular rate and rhythm; No murmurs, rubs, or gallops  ABDOMEN: Soft, Nontender, Nondistended; Bowel sounds present  EXTREMITIES:  2+ Peripheral Pulses, No clubbing, cyanosis, or edema  LYMPH: No lymphadenopathy noted  SKIN: No rashes or lesions    Care Discussed with Consultants/Other Providers [ ] YES  [ ] NO

## 2020-11-01 ENCOUNTER — APPOINTMENT (OUTPATIENT)
Dept: DISASTER EMERGENCY | Facility: CLINIC | Age: 65
End: 2020-11-01

## 2020-11-01 LAB
-  AMIKACIN: SIGNIFICANT CHANGE UP
-  AMOXICILLIN/CLAVULANIC ACID: SIGNIFICANT CHANGE UP
-  AMPICILLIN/SULBACTAM: SIGNIFICANT CHANGE UP
-  AMPICILLIN: SIGNIFICANT CHANGE UP
-  AZTREONAM: SIGNIFICANT CHANGE UP
-  CEFAZOLIN: SIGNIFICANT CHANGE UP
-  CEFEPIME: SIGNIFICANT CHANGE UP
-  CEFOXITIN: SIGNIFICANT CHANGE UP
-  CEFTRIAXONE: SIGNIFICANT CHANGE UP
-  CIPROFLOXACIN: SIGNIFICANT CHANGE UP
-  ERTAPENEM: SIGNIFICANT CHANGE UP
-  GENTAMICIN: SIGNIFICANT CHANGE UP
-  IMIPENEM: SIGNIFICANT CHANGE UP
-  LEVOFLOXACIN: SIGNIFICANT CHANGE UP
-  MEROPENEM: SIGNIFICANT CHANGE UP
-  NITROFURANTOIN: SIGNIFICANT CHANGE UP
-  PIPERACILLIN/TAZOBACTAM: SIGNIFICANT CHANGE UP
-  TIGECYCLINE: SIGNIFICANT CHANGE UP
-  TOBRAMYCIN: SIGNIFICANT CHANGE UP
-  TRIMETHOPRIM/SULFAMETHOXAZOLE: SIGNIFICANT CHANGE UP
ALBUMIN SERPL ELPH-MCNC: 3.6 G/DL — SIGNIFICANT CHANGE UP (ref 3.3–5)
ALP SERPL-CCNC: 68 U/L — SIGNIFICANT CHANGE UP (ref 40–120)
ALT FLD-CCNC: 25 U/L — SIGNIFICANT CHANGE UP (ref 4–33)
ANION GAP SERPL CALC-SCNC: 11 MMO/L — SIGNIFICANT CHANGE UP (ref 7–14)
APTT BLD: 94.9 SEC — HIGH (ref 27–36.3)
AST SERPL-CCNC: 15 U/L — SIGNIFICANT CHANGE UP (ref 4–32)
BILIRUB SERPL-MCNC: 0.6 MG/DL — SIGNIFICANT CHANGE UP (ref 0.2–1.2)
BUN SERPL-MCNC: 20 MG/DL — SIGNIFICANT CHANGE UP (ref 7–23)
CALCIUM SERPL-MCNC: 8.6 MG/DL — SIGNIFICANT CHANGE UP (ref 8.4–10.5)
CHLORIDE SERPL-SCNC: 105 MMOL/L — SIGNIFICANT CHANGE UP (ref 98–107)
CO2 SERPL-SCNC: 22 MMOL/L — SIGNIFICANT CHANGE UP (ref 22–31)
CREAT SERPL-MCNC: 1.08 MG/DL — SIGNIFICANT CHANGE UP (ref 0.5–1.3)
CULTURE RESULTS: SIGNIFICANT CHANGE UP
GLUCOSE SERPL-MCNC: 108 MG/DL — HIGH (ref 70–99)
HAV IGM SER-ACNC: NONREACTIVE — SIGNIFICANT CHANGE UP
HBV CORE IGM SER-ACNC: NONREACTIVE — SIGNIFICANT CHANGE UP
HBV SURFACE AG SER-ACNC: NONREACTIVE — SIGNIFICANT CHANGE UP
HCT VFR BLD CALC: 38.4 % — SIGNIFICANT CHANGE UP (ref 34.5–45)
HCV AB S/CO SERPL IA: 0.13 S/CO — SIGNIFICANT CHANGE UP (ref 0–0.99)
HCV AB SERPL-IMP: SIGNIFICANT CHANGE UP
HGB BLD-MCNC: 11.7 G/DL — SIGNIFICANT CHANGE UP (ref 11.5–15.5)
MAGNESIUM SERPL-MCNC: 2.4 MG/DL — SIGNIFICANT CHANGE UP (ref 1.6–2.6)
MCHC RBC-ENTMCNC: 26.5 PG — LOW (ref 27–34)
MCHC RBC-ENTMCNC: 30.5 % — LOW (ref 32–36)
MCV RBC AUTO: 86.9 FL — SIGNIFICANT CHANGE UP (ref 80–100)
METHOD TYPE: SIGNIFICANT CHANGE UP
NRBC # FLD: 0 K/UL — SIGNIFICANT CHANGE UP (ref 0–0)
ORGANISM # SPEC MICROSCOPIC CNT: SIGNIFICANT CHANGE UP
ORGANISM # SPEC MICROSCOPIC CNT: SIGNIFICANT CHANGE UP
PHOSPHATE SERPL-MCNC: 4.3 MG/DL — SIGNIFICANT CHANGE UP (ref 2.5–4.5)
PLATELET # BLD AUTO: 198 K/UL — SIGNIFICANT CHANGE UP (ref 150–400)
PMV BLD: 11.7 FL — SIGNIFICANT CHANGE UP (ref 7–13)
POTASSIUM SERPL-MCNC: 4.1 MMOL/L — SIGNIFICANT CHANGE UP (ref 3.5–5.3)
POTASSIUM SERPL-SCNC: 4.1 MMOL/L — SIGNIFICANT CHANGE UP (ref 3.5–5.3)
PROT SERPL-MCNC: 6.9 G/DL — SIGNIFICANT CHANGE UP (ref 6–8.3)
RBC # BLD: 4.42 M/UL — SIGNIFICANT CHANGE UP (ref 3.8–5.2)
RBC # FLD: 14.6 % — HIGH (ref 10.3–14.5)
SARS-COV-2 IGG SERPL QL IA: NEGATIVE — SIGNIFICANT CHANGE UP
SARS-COV-2 IGM SERPL IA-ACNC: 0.08 INDEX — SIGNIFICANT CHANGE UP
SODIUM SERPL-SCNC: 138 MMOL/L — SIGNIFICANT CHANGE UP (ref 135–145)
SPECIMEN SOURCE: SIGNIFICANT CHANGE UP
WBC # BLD: 7.28 K/UL — SIGNIFICANT CHANGE UP (ref 3.8–10.5)
WBC # FLD AUTO: 7.28 K/UL — SIGNIFICANT CHANGE UP (ref 3.8–10.5)

## 2020-11-01 PROCEDURE — 93010 ELECTROCARDIOGRAM REPORT: CPT

## 2020-11-01 RX ADMIN — HEPARIN SODIUM 1300 UNIT(S)/HR: 5000 INJECTION INTRAVENOUS; SUBCUTANEOUS at 07:54

## 2020-11-01 NOTE — PROGRESS NOTE ADULT - SUBJECTIVE AND OBJECTIVE BOX
Syed Mckay MD  Interventional Cardiology / Endovascular Specialist  [place] Office : [phone] [place] [address] [place] [address]  Tel:   [place] Office : [address] [facility] [place] [address]  Tel: [phone]  Cell : [phone]    Subjective/Overnight events: Patient seen sitting up in bed. Currently not having chest pain. Continues to have SOB on exertion  	  MEDICATIONS:  heparin   Injectable 7000 Unit(s) IV Push every 6 hours PRN  heparin   Injectable 3500 Unit(s) IV Push every 6 hours PRN  heparin  Infusion.  Unit(s)/Hr IV Continuous <Continuous>        acetaminophen   Tablet .. 650 milliGRAM(s) Oral every 6 hours PRN        influenza   Vaccine 0.5 milliLiter(s) IntraMuscular once      PAST MEDICAL/SURGICAL HISTORY  PAST MEDICAL & SURGICAL HISTORY:  Atrial tachycardia    Ventricular septal defect    History of orthostatic hypotension    Single kidney    S/p nephrectomy  donated kidney- left 1984        SOCIAL HISTORY: Substance Use (street drugs): ( x ) never used  (  ) other:    FAMILY HISTORY:  Family history of prostate cancer (Father)        REVIEW OF SYSTEMS:  CONSTITUTIONAL: No fever, weight loss, or fatigue  EYES: No eye pain, visual disturbances, or discharge  ENMT:  No difficulty hearing, tinnitus, vertigo; No sinus or throat pain  BREASTS: No pain, masses, or nipple discharge  GASTROINTESTINAL: No abdominal or epigastric pain. No nausea, vomiting, or hematemesis; No diarrhea or constipation. No melena or hematochezia.  GENITOURINARY: No dysuria, frequency, hematuria, or incontinence  NEUROLOGICAL: No headaches, memory loss, loss of strength, numbness, or tremors  ENDOCRINE: No heat or cold intolerance; No hair loss  MUSCULOSKELETAL: No joint pain or swelling; No muscle, back, or extremity pain  PSYCHIATRIC: No depression, anxiety, mood swings, or difficulty sleeping  HEME/LYMPH: No easy bruising, or bleeding gums  All others negative    PHYSICAL EXAM:  T(C): 36.4 (11-01-20 @ 11:42), Max: 36.7 (10-31-20 @ 21:42)  HR: 72 (11-01-20 @ 11:42) (60 - 77)  BP: 114/69 (11-01-20 @ 11:42) (110/71 - 130/77)  RR: 17 (11-01-20 @ 11:42) (17 - 18)  SpO2: 100% (11-01-20 @ 11:42) (95% - 100%)  Wt(kg): --  I&O's Summary    31 Oct 2020 08:01  -  01 Nov 2020 07:00  --------------------------------------------------------  IN: 1776 mL / OUT: 2050 mL / NET: -274 mL    01 Nov 2020 07:01  -  01 Nov 2020 12:57  --------------------------------------------------------  IN: 26 mL / OUT: 0 mL / NET: 26 mL          GENERAL: NAD, well-groomed, well-developed  EYES: EOMI, PERRLA, conjunctiva and sclera clear  ENMT: No tonsillar erythema, exudates, or enlargement  Cardiovascular: Normal S1 S2, No JVD, No murmurs, No edema  Respiratory: Lungs diminished to auscultation	  Gastrointestinal:  Soft, Non-tender, + BS	  Extremities: Normal range of motion, No clubbing, cyanosis or edema  LYMPH: No lymphadenopathy noted  NERVOUS SYSTEM:  Alert & Oriented X3                                    11.7   7.28  )-----------( 198      ( 01 Nov 2020 04:41 )             38.4     11-01    138  |  105  |  20  ----------------------------<  108<H>  4.1   |  22  |  1.08    Ca    8.6      01 Nov 2020 04:41  Phos  4.3     11-01  Mg     2.4     11-01    TPro  6.9  /  Alb  3.6  /  TBili  0.6  /  DBili  x   /  AST  15  /  ALT  25  /  AlkPhos  68  11-01    proBNP:   Lipid Profile:   HgA1c:   TSH:     Consultant(s) Notes Reviewed:  [x ] YES  [ ] NO    Care Discussed with Consultants/Other Providers [ x] YES  [ ] NO    Imaging Personally Reviewed independently:  [x] YES  [ ] NO    All labs, radiologic studies, vitals, orders and medications list reviewed. Patient is seen and examined at bedside. Case discussed with medical team.

## 2020-11-01 NOTE — PROGRESS NOTE ADULT - ASSESSMENT
Problem/Plan - 1:  ·  Problem: Shortness of breath.  Plan:telemonitor  cards and pulmonary fu     Problem/Plan - 2:  ·  Problem: Palpitations.  Plan: Was planned for ablation on 11/3 and instructed to hold metoprolol 25mg qhs for ten days prior  -f/u EP recs    Problem/Plan - 3:  ·  Problem: Chest pain, unspecified type.  Plan: 2 episodes of chest pain overnight a few days ago, stabbing in nature, intermittent and left sided. Atypical in nature. Trop 27--26  Ischemia thomas as per cards     Problem/Plan - 4:·  Problem: Leg swelling.  Plan: No pitting edema appreciated on exam at this time though patient with RLE calf tendernes and reported edema day prior. She states was pitting  -LE duplex.     Problem/Plan - 5:  ·  Problem: Transaminitis.  Plan: AST 45 ALT 53 and normal one year ago. This may be related to congestive hepatopathy if with CHF though unclear at this time  fu LFTs    Problem/Plan - 6:  Problem: Need for prophylactic measure. Plan: Lovenox  Regular diet.

## 2020-11-01 NOTE — PROGRESS NOTE ADULT - ASSESSMENT
Assessment and Plan    65F with PMHx AT s/p failed ablation (not performed in 2015), positive tilt table/orthostatic hypotension, VSD, non-obstructive CAD, PVCs, chronic palpitations, renal donor (s/p L nephrectomy for son) presenting from PST for palpitations and SOB with exertion    EKG: NSR with twave inversions in leads AVF, III, V3-V6  Tele: NSR 60-70s with PAT    1. SOB  -patient states has SOB on exertion for past week  -CTA chest with extensive PE in all 5 lobes. on hep gtt. f/u MICU consult appreciated   -currently hemodynamically stable and not requiring oxygen at rest  -right heart strain noted on CTA chest, echo with Dilated RV and Decreased RV function , No troponin elevation , RV normal on MRI on 9/23/2020   -LE dopplers with acute Rt peroneal and PT vein DVT   -c/w heparin , discussed catheter directed lytics with family and Dr Nhung ruiz, given hemodynamic stability and Pt not hypoxic will hold off for now   - will get heme eval for hypercoagulable work up      2. Palpitations  -patient diagnosed with AT and was getting workup for outpatient ablation  -f/u EP    3. Transaminitis  -patient with elevated LFTs  -denies abdominal pain  -abd US negative

## 2020-11-01 NOTE — PROGRESS NOTE ADULT - SUBJECTIVE AND OBJECTIVE BOX
Patient is a 65y old  Female who presents with a chief complaint of SOB and palpitations (01 Nov 2020 12:57)      INTERVAL HPI/OVERNIGHT EVENTS:  T(C): 36.4 (11-01-20 @ 11:42), Max: 36.7 (10-31-20 @ 21:42)  HR: 72 (11-01-20 @ 11:42) (60 - 77)  BP: 114/69 (11-01-20 @ 11:42) (110/71 - 130/77)  RR: 17 (11-01-20 @ 11:42) (17 - 18)  SpO2: 100% (11-01-20 @ 11:42) (95% - 100%)  Wt(kg): --  I&O's Summary    31 Oct 2020 08:01  -  01 Nov 2020 07:00  --------------------------------------------------------  IN: 1776 mL / OUT: 2050 mL / NET: -274 mL    01 Nov 2020 07:01  -  01 Nov 2020 14:00  --------------------------------------------------------  IN: 26 mL / OUT: 0 mL / NET: 26 mL        LABS:                        11.7   7.28  )-----------( 198      ( 01 Nov 2020 04:41 )             38.4     11-01    138  |  105  |  20  ----------------------------<  108<H>  4.1   |  22  |  1.08    Ca    8.6      01 Nov 2020 04:41  Phos  4.3     11-01  Mg     2.4     11-01    TPro  6.9  /  Alb  3.6  /  TBili  0.6  /  DBili  x   /  AST  15  /  ALT  25  /  AlkPhos  68  11-01    PTT - ( 01 Nov 2020 04:41 )  PTT:94.9 SEC    CAPILLARY BLOOD GLUCOSE                MEDICATIONS  (STANDING):  heparin  Infusion.  Unit(s)/Hr (16 mL/Hr) IV Continuous <Continuous>  influenza   Vaccine 0.5 milliLiter(s) IntraMuscular once    MEDICATIONS  (PRN):  acetaminophen   Tablet .. 650 milliGRAM(s) Oral every 6 hours PRN Moderate Pain (4 - 6)  heparin   Injectable 7000 Unit(s) IV Push every 6 hours PRN For aPTT less than 40  heparin   Injectable 3500 Unit(s) IV Push every 6 hours PRN For aPTT between 40 - 57          PHYSICAL EXAM:  GENERAL: NAD, well-groomed, well-developed  HEAD:  Atraumatic, Normocephalic  CHEST/LUNG: Clear to percussion bilaterally; No rales, rhonchi, wheezing, or rubs  HEART: Regular rate and rhythm; No murmurs, rubs, or gallops  ABDOMEN: Soft, Nontender, Nondistended; Bowel sounds present  EXTREMITIES:  2+ Peripheral Pulses, No clubbing, cyanosis, or edema  LYMPH: No lymphadenopathy noted  SKIN: No rashes or lesions    Care Discussed with Consultants/Other Providers [ ] YES  [ ] NO

## 2020-11-02 LAB
ALBUMIN SERPL ELPH-MCNC: 3.7 G/DL — SIGNIFICANT CHANGE UP (ref 3.3–5)
ALP SERPL-CCNC: 67 U/L — SIGNIFICANT CHANGE UP (ref 40–120)
ALT FLD-CCNC: 31 U/L — SIGNIFICANT CHANGE UP (ref 4–33)
ANION GAP SERPL CALC-SCNC: 10 MMO/L — SIGNIFICANT CHANGE UP (ref 7–14)
APTT BLD: 66.4 SEC — HIGH (ref 27–36.3)
AST SERPL-CCNC: 26 U/L — SIGNIFICANT CHANGE UP (ref 4–32)
BILIRUB DIRECT SERPL-MCNC: < 0.2 MG/DL — SIGNIFICANT CHANGE UP (ref 0.1–0.2)
BILIRUB SERPL-MCNC: 0.5 MG/DL — SIGNIFICANT CHANGE UP (ref 0.2–1.2)
BUN SERPL-MCNC: 21 MG/DL — SIGNIFICANT CHANGE UP (ref 7–23)
CALCIUM SERPL-MCNC: 8.8 MG/DL — SIGNIFICANT CHANGE UP (ref 8.4–10.5)
CHLORIDE SERPL-SCNC: 107 MMOL/L — SIGNIFICANT CHANGE UP (ref 98–107)
CO2 SERPL-SCNC: 22 MMOL/L — SIGNIFICANT CHANGE UP (ref 22–31)
CREAT SERPL-MCNC: 1.08 MG/DL — SIGNIFICANT CHANGE UP (ref 0.5–1.3)
GLUCOSE SERPL-MCNC: 108 MG/DL — HIGH (ref 70–99)
HCT VFR BLD CALC: 36.1 % — SIGNIFICANT CHANGE UP (ref 34.5–45)
HGB BLD-MCNC: 11.3 G/DL — LOW (ref 11.5–15.5)
MAGNESIUM SERPL-MCNC: 2.4 MG/DL — SIGNIFICANT CHANGE UP (ref 1.6–2.6)
MCHC RBC-ENTMCNC: 26.8 PG — LOW (ref 27–34)
MCHC RBC-ENTMCNC: 31.3 % — LOW (ref 32–36)
MCV RBC AUTO: 85.5 FL — SIGNIFICANT CHANGE UP (ref 80–100)
NRBC # FLD: 0 K/UL — SIGNIFICANT CHANGE UP (ref 0–0)
PHOSPHATE SERPL-MCNC: 3.8 MG/DL — SIGNIFICANT CHANGE UP (ref 2.5–4.5)
PLATELET # BLD AUTO: 210 K/UL — SIGNIFICANT CHANGE UP (ref 150–400)
PMV BLD: 11.3 FL — SIGNIFICANT CHANGE UP (ref 7–13)
POTASSIUM SERPL-MCNC: 4.3 MMOL/L — SIGNIFICANT CHANGE UP (ref 3.5–5.3)
POTASSIUM SERPL-SCNC: 4.3 MMOL/L — SIGNIFICANT CHANGE UP (ref 3.5–5.3)
PROT SERPL-MCNC: 7.1 G/DL — SIGNIFICANT CHANGE UP (ref 6–8.3)
RBC # BLD: 4.22 M/UL — SIGNIFICANT CHANGE UP (ref 3.8–5.2)
RBC # FLD: 15 % — HIGH (ref 10.3–14.5)
SODIUM SERPL-SCNC: 139 MMOL/L — SIGNIFICANT CHANGE UP (ref 135–145)
WBC # BLD: 7.88 K/UL — SIGNIFICANT CHANGE UP (ref 3.8–10.5)
WBC # FLD AUTO: 7.88 K/UL — SIGNIFICANT CHANGE UP (ref 3.8–10.5)

## 2020-11-02 PROCEDURE — 99232 SBSQ HOSP IP/OBS MODERATE 35: CPT

## 2020-11-02 PROCEDURE — 93010 ELECTROCARDIOGRAM REPORT: CPT

## 2020-11-02 PROCEDURE — 99223 1ST HOSP IP/OBS HIGH 75: CPT | Mod: GC

## 2020-11-02 PROCEDURE — 99233 SBSQ HOSP IP/OBS HIGH 50: CPT

## 2020-11-02 RX ORDER — CEFTRIAXONE 500 MG/1
INJECTION, POWDER, FOR SOLUTION INTRAMUSCULAR; INTRAVENOUS
Refills: 0 | Status: DISCONTINUED | OUTPATIENT
Start: 2020-11-02 | End: 2020-11-05

## 2020-11-02 RX ORDER — CEFTRIAXONE 500 MG/1
1000 INJECTION, POWDER, FOR SOLUTION INTRAMUSCULAR; INTRAVENOUS ONCE
Refills: 0 | Status: COMPLETED | OUTPATIENT
Start: 2020-11-02 | End: 2020-11-02

## 2020-11-02 RX ORDER — CEFTRIAXONE 500 MG/1
1000 INJECTION, POWDER, FOR SOLUTION INTRAMUSCULAR; INTRAVENOUS EVERY 24 HOURS
Refills: 0 | Status: DISCONTINUED | OUTPATIENT
Start: 2020-11-03 | End: 2020-11-05

## 2020-11-02 RX ADMIN — CEFTRIAXONE 100 MILLIGRAM(S): 500 INJECTION, POWDER, FOR SOLUTION INTRAMUSCULAR; INTRAVENOUS at 16:08

## 2020-11-02 RX ADMIN — HEPARIN SODIUM 1300 UNIT(S)/HR: 5000 INJECTION INTRAVENOUS; SUBCUTANEOUS at 08:47

## 2020-11-02 NOTE — PROGRESS NOTE ADULT - ASSESSMENT
65F AT s/p failed ablation, positive tilt table/orthostatic hypotension, non-obstructive CAD, PVCs, chronic palpitations, renal donor (s/p L nephrectomy for son) presenting from PST for palpitations and SOB at rest in setting of recent travel to the [place]. Found to have PE on CTA chest 10/29 with evidence of RV strain.     Plan:  - Continuous telemetric monitoring for AT  - Monitor electrolytes and replete for K>4 and Mg>2  - Obtain TTE  - CTA chest with extensive PE in all 5 lobes. on hep gtt. PE management per MICU team  - CTA  also noted right heart strain   - TTE  with Dilated RV and Decreased RV function of note RV normal on MRI on 9/23/2020   - LE dopplers with acute Rt peroneal and PT vein DVT  - Plan to start Xarelto after heparin gtt  - Discussed EP study and AT ablation. Patient is consented but given PE procedure has been canceled. The risk and benefits for each procedure were explain in detail which included but not limited to bleeding, infection, stroke, cardiac tamponade and death. Patient expressed understanding an all questions were answered    Gary Hunt PA-C

## 2020-11-02 NOTE — CONSULT NOTE ADULT - ATTENDING COMMENTS
Patient seen and examined, agree with above. Extensive clotting. Recommend work-up to rule out malignancy. All other testing can be followed as outpatient once ready for discharge--will need to f/u w hematology at [facility].

## 2020-11-02 NOTE — PROGRESS NOTE ADULT - ASSESSMENT
Problem/Plan - 1:  ·  Problem: Shortness of breath.  Plan:telemonitor  cards and pulmonary fu     Problem/Plan - 2:  ·  Problem: Palpitations.  Plan: telemonitor   -f/u EP recs    Problem/Plan - 3:  ·  Problem: Chest pain, unspecified type.  Plan: 2 episodes of chest pain overnight a few days ago, stabbing in nature, intermittent and left sided. Atypical in nature. Trop 27--26  Ischemia thomas as per cards     Problem/Plan - 4:·  Problem: Leg swelling.  Plan: No pitting edema appreciated on exam at this time though patient with RLE calf tendernes and reported edema day prior. She states was pitting  -LE duplex.     Problem/Plan - 5:  ·  Problem: Transaminitis.  Plan: AST 45 ALT 53 and normal one year ago. This may be related to congestive hepatopathy if with CHF though unclear at this time  fu LFTs    Problem/Plan - 6:Problem: Need for prophylactic measure. Plan: Lovenox  Regular diet.

## 2020-11-02 NOTE — PROGRESS NOTE ADULT - SUBJECTIVE AND OBJECTIVE BOX
Patient is a 65y old  Female who presents with a chief complaint of SOB and palpitations (02 Nov 2020 13:59)      INTERVAL HPI/OVERNIGHT EVENTS:  T(C): 37 (11-02-20 @ 11:31), Max: 37 (11-02-20 @ 11:31)  HR: 71 (11-02-20 @ 11:31) (63 - 140)  BP: 133/64 (11-02-20 @ 11:31) (111/63 - 133/64)  RR: 17 (11-02-20 @ 11:31) (16 - 18)  SpO2: 95% (11-02-20 @ 11:31) (93% - 96%)  Wt(kg): --  I&O's Summary    01 Nov 2020 07:01  -  02 Nov 2020 07:00  --------------------------------------------------------  IN: 1366 mL / OUT: 800 mL / NET: 566 mL    02 Nov 2020 07:01  -  02 Nov 2020 17:14  --------------------------------------------------------  IN: 644 mL / OUT: 1 mL / NET: 643 mL        LABS:                        11.3   7.88  )-----------( 210      ( 02 Nov 2020 06:41 )             36.1     11-02    139  |  107  |  21  ----------------------------<  108<H>  4.3   |  22  |  1.08    Ca    8.8      02 Nov 2020 06:41  Phos  3.8     11-02  Mg     2.4     11-02    TPro  7.1  /  Alb  3.7  /  TBili  0.5  /  DBili  < 0.2  /  AST  26  /  ALT  31  /  AlkPhos  67  11-02    PTT - ( 02 Nov 2020 06:41 )  PTT:66.4 SEC    CAPILLARY BLOOD GLUCOSE                MEDICATIONS  (STANDING):  cefTRIAXone   IVPB      heparin  Infusion.  Unit(s)/Hr (16 mL/Hr) IV Continuous <Continuous>  influenza   Vaccine 0.5 milliLiter(s) IntraMuscular once    MEDICATIONS  (PRN):  acetaminophen   Tablet .. 650 milliGRAM(s) Oral every 6 hours PRN Moderate Pain (4 - 6)  heparin   Injectable 7000 Unit(s) IV Push every 6 hours PRN For aPTT less than 40  heparin   Injectable 3500 Unit(s) IV Push every 6 hours PRN For aPTT between 40 - 57          PHYSICAL EXAM:  GENERAL: NAD, well-groomed, well-developed  HEAD:  Atraumatic, Normocephalic  CHEST/LUNG: Clear to percussion bilaterally; No rales, rhonchi, wheezing, or rubs  HEART: Regular rate and rhythm; No murmurs, rubs, or gallops  ABDOMEN: Soft, Nontender, Nondistended; Bowel sounds present  EXTREMITIES:  2+ Peripheral Pulses, No clubbing, cyanosis, or edema  LYMPH: No lymphadenopathy noted  SKIN: No rashes or lesions    Care Discussed with Consultants/Other Providers [ ] YES  [ ] NO

## 2020-11-02 NOTE — PROGRESS NOTE ADULT - SUBJECTIVE AND OBJECTIVE BOX
Subjective: No new complaints. Denies HA, lightheadedness, dizziness, CP, SOB, abdominal pain, N/V.      Interval events:  -10/29/2020 went to pre surgical test for clearance for complex ablation on 11/3/2020 and was found to be SOB at rest with palpitation and TWI. Patient was [facility] ED and found to have PE on CTA chest 10/29 with evidence of RV strain and LE dopplers with acute Rt peroneal and PT vein DVT. AT ablation which was scudle for 11/3/2020 is not postponed and will workup for outpatient ablation.     MEDICATIONS  (STANDING):  heparin  Infusion.  Unit(s)/Hr (16 mL/Hr) IV Continuous <Continuous>  influenza   Vaccine 0.5 milliLiter(s) IntraMuscular once    MEDICATIONS  (PRN):  acetaminophen   Tablet .. 650 milliGRAM(s) Oral every 6 hours PRN Moderate Pain (4 - 6)  heparin   Injectable 7000 Unit(s) IV Push every 6 hours PRN For aPTT less than 40  heparin   Injectable 3500 Unit(s) IV Push every 6 hours PRN For aPTT between 40 - 57    Vital Signs Last 24 Hrs  T(C): 36.6 (02 Nov 2020 06:31), Max: 36.8 (01 Nov 2020 20:00)  T(F): 97.8 (02 Nov 2020 06:31), Max: 98.3 (01 Nov 2020 20:00)  HR: 65 (02 Nov 2020 06:31) (63 - 140)  BP: 132/74 (02 Nov 2020 06:31) (111/63 - 132/80)  BP(mean): --  RR: 17 (02 Nov 2020 06:31) (16 - 18)  SpO2: 96% (02 Nov 2020 06:31) (93% - 100%)  I&O's Detail    01 Nov 2020 07:01  -  02 Nov 2020 07:00  --------------------------------------------------------  IN:    Heparin Infusion: 286 mL    Oral Fluid: 1080 mL  Total IN: 1366 mL    OUT:    Voided (mL): 800 mL  Total OUT: 800 mL    Total NET: 566 mL    Physical Exam:  GENERAL: Lying in bed, well appearing, speaking in full sentence, in NAD  HEART: S1S2 RRR; No murmursappreciated  PULMONARY: CTABL with decreased breath sounds , normal respiratory effort. No use of accessory muslces  ABDOMEN: + Bowel sounds present, soft, NDNT  EXTREMITIES:  Warm, well -perfused, no pedal edema, distal pulses present  NEUROLOGICAL:AOx3    TELEMETERIC: NSR with occasional PAT                             11.3   7.88  )-----------( 210      ( 02 Nov 2020 06:41 )             36.1     PTT - ( 02 Nov 2020 06:41 )  PTT:66.4 SEC  11-02    139  |  107  |  21  ----------------------------<  108<H>  4.3   |  22  |  1.08    Ca    8.8      02 Nov 2020 06:41  Phos  3.8     11-02  Mg     2.4     11-02    TPro  6.9  /  Alb  3.6  /  TBili  0.6  /  DBili  x   /  AST  15  /  ALT  25  /  AlkPhos  68  11-01    RADIOLOGY & ADDITIONAL STUDIES:  < from: Xray Chest 2 Views PA/Lat (10.29.20 @ 17:47) >  FINDINGS:    The lungs are clear.  There is no pneumothorax or pleural effusion.  Cardiomegaly.  No acute osseous abnormality.  Surgical clips seen over the midabdomen.    IMPRESSION:    Clear lungs.    < end of copied text >    < from: Transthoracic Echocardiogram (10.30.20 @ 11:07) >  CONCLUSIONS:  1. Normal trileaflet aortic valve. Mild aortic  regurgitation.  2. Upper limits of normal aortic root size for BSA.  3. Normal left ventricular systolic function. No segmental  wall motion abnormalities.  4. Right ventricular enlargement with decreased right  ventricular systolic function.  5. Normal tricuspid valve.  Mild-moderate tricuspid  regurgitation.       < from: CT Angio Chest w/ IV Cont (10.29.20 @ 23:38) >  IMPRESSION:  Extensive pulmonary embolism. Suggestion right heart strain. Echo recommended for complete evaluation.    Right upper lobe 6 mm nodule is indeterminate. 6 month follow-up CT recommended for complete evaluation.    < end of copied text >    TTE 3/6/2019  DIMENSIONS:  Dimensions:     Normal Values:  LA:     3.9 cm    2.0 - 4.0 cm  Ao:     3.9 cm    2.0 - 3.8 cm  SEPTUM:0.9 cm    0.6 - 1.2 cm  PWT:    0.9 cm    0.6 - 1.1 cm  LVIDd:  4.6 cm    3.0 - 5.6 cm  LVIDs:  3.0 cm    1.8 - 4.0 cm    Derived Variables:  LVMI: 70 g/m2  RWT: 0.39  Ejection Fraction Visual Estimate: 55-60 %  ------------------------------------------------------------------------  OBSERVATIONS:  Mitral Valve: Normal mitral valve. Mild mitral  regurgitation.  Aortic Root: Aortic Root: 3.9 cm.spersad    Aortic Valve: Normal trileaflet aortic valve. Mild aortic  insufficiency.  Left Atrium: Normal left atrium.  LA volume index = 28  cc/m2.  Left Ventricle: Normal Left Ventricular Systolic Function,  (EF = 55 to 60%) Normal left ventricular internal  dimensions and wall thicknesses. Grade II diastolic  dysfunction.  Right Heart: Normal right atrium. Normal right ventricular  size and systolic function (TAPSE  2.3cm). There is mild  tricuspid regurgitation. Normal pulmonic valve.  Pericardium/PleuraNormal pericardium with no pericardial  effusion.  Hemodynamic: RA Pressure is 8 mm Hg. RV systolic pressure  is 37 mm Hg.  ------------------------------------------------------------------------  CONCLUSIONS:  1. Normal mitral valve. Mild mitral regurgitation.  2. Normal trileaflet aortic valve. Mild aortic  insufficiency.  3. Aortic Root: 3.9 cm.  4. Normal left atrium.  LA volume index = 28 cc/m2.  5. Normal left ventricular internal dimensions and wall  thicknesses.  6. Normal Left Ventricular Systolic Function,  (EF = 55 to  60%)  7. Grade II diastolic dysfunction.  8. Normal right atrium.  9. Normal right ventricular size and systolic function  (TAPSE  2.3cm).  10. RA Pressure is 8 mm Hg.  11. RV systolic pressure is 37 mm Hg.  12. There is mild tricuspid regurgitation.  13. Normal pulmonic valve.  14. Normal pericardium with no pericardial effusion.    ------------------------------------------------------------------------  Confirmed on  3/6/2019 - 07:35:47 by Kim Valera MD    < from: MR Cardiac Velocity Flow Map (09.23.20 @ 14:39) >  LVEDV: 115 ml  LVESV: 48 ml  LVSV: 67 ml  LVEF: 58 % (normal > 56%)    Cardiac output: 3.6 L/min  Cardiac index: 1.7 L/min/m2  LV mass: 81 g      LV measurements: (I=Indexed to BSA)  LVEDVI: 56 ml/m2 (normal < 95)  LVESVI: 23 ml/m2  LVSVI: 33 ml/m2  LV mass indexed: 40 g/m2 (normal < 80)      LV DANIEL: 53 mm (normal < 60)  Anterior septal wall: 7 mm  Posterior lateral wall: 6 mm      LV ESD: 33 mm    Left atrium:  The left atrium is mildly enlarged in size.  Biplane ES volume = 61 mL (normal <55)  ES LA Volume / BSA = 30 mL/m^2 (normal <30)    Right Ventricle:  There is normal RV size and normal global systolic function. There is no fatty infiltration of the RV wall. No delayed enhancement is seen within the right ventricular myocardium. There are no regional wall motion abnormalities or focal aneurysmal motion.    Right atrium:  The right atrium is enlarged in size.    Aortic valve:  Quantification was not performed; however, qualitatively there are no abnormal flow jets to suggest aortic stenosis or regurgitation.    Pulmonic valve: Quantification was not performed; however, qualitatively there are no abnormal flow jets to suggest aortic stenosis or regurgitation    Mitral valve:  No significant mitral stenosis or regurgitation.    Tricuspid valve:  No significant tricuspid stenosis or regurgitation.    Flow measurements:  No ASD or VSD. Qp/Qs measurement is 1.0.    Pericardium:  No pericardial effusion. No pericardial thickening.    Extracardiac findings:  The chest wall and mediastinum are unremarkable. No pleural effusions.  Limited evaluation of the lungs demonstrate no abnormal signal characteristics. 2.2 cm hepatic cyst.    IMPRESSION:  1.  Normal left ventricular size and systolic function. No myocardial edema. No delayed enhancement was visualized in the left ventricular myocardium. Mild left atrial enlargement.  2.  Mild right atrial enlargement. Normal right ventricular size. No delayed enhancement or fatty infiltration was visualized in the right ventricular myocardium.  3.  No ASD or VSD. Qp/Qs measurement is 1.0.  4. No valvular abnormalities.

## 2020-11-02 NOTE — PROGRESS NOTE ADULT - ASSESSMENT
Patient is a 64 yo F w/ AT (s/p failed ablation ), orthostatic hypotension (positive tilt table), ?VSD (not visualized on 2020 cardiac MRI), CAD, renal donor (s/p L nephrectomy for son) admitted yesterday from PST for palpitations and SOB at rest found to have PE in 5 lobes extending proximally on the right from the peripheral main pulmonary artery and proximally on the left from both central lumbar arteries as well as RUL 6mm nodule. Pulmonary consulted for submassive PE    PE  - PE found in 5 lobes   - currently on AC with heparin gtt   - TTE shows RV dilation and decreased RV function   - VA duplex of the lower ext was negative   - can consider to switch to a PO DOAC    RUL nodule   - Will need repeat CT chest in 6 months   - Please email: [email or web address] to setup an appointment prior to discharge. Include the patient's name, , MRN and contact information in the email.      Pulmonary/Sleep Clinic  [address] [place] [address]  [phone]    Devan Shannon MD   Pulmonary/Critical Care Fellow PGY-5   [facility] Pager #: [phone]  [facility] Pager #: [phone] Patient is a 64 yo F w/ AT (s/p failed ablation ), orthostatic hypotension (positive tilt table), ?VSD (not visualized on 2020 cardiac MRI), CAD, renal donor (s/p L nephrectomy for son) admitted yesterday from PST for palpitations and SOB at rest found to have PE in 5 lobes extending proximally on the right from the peripheral main pulmonary artery and proximally on the left from both central lumbar arteries as well as RUL 6mm nodule. Pulmonary consulted for submassive PE    PE  - PE found in 5 lobes   - currently on AC with heparin gtt   - TTE shows RV dilation and decreased RV function   - VA duplex of the lower ext showed a right peroneal DVT   - can consider to switch to a PO DOAC    RUL nodule   - Will need repeat CT chest in 6 months   - Please email: [email or web address] to setup an appointment prior to discharge. Include the patient's name, , MRN and contact information in the email.      Pulmonary/Sleep Clinic  [address] [place] [address]  [phone]    Devan Shannon MD   Pulmonary/Critical Care Fellow PGY-5   [facility] Pager #: [phone]  [facility] Pager #: [phone]

## 2020-11-02 NOTE — CONSULT NOTE ADULT - SUBJECTIVE AND OBJECTIVE BOX
HPI:  65F with PMHx AT s/p failed ablation (not performed in 2015), positive tilt table/orthostatic hypotension, VSD, non-obstructive CAD, PVCs, chronic palpitations, renal donor (s/p L nephrectomy for son) presenting from PST for palpitations and SOB at rest. Pt initially started having palpitations 5 years ago but was unable to have ablation then. Since patient has on and off palpitations but has been able to perform ADLs. On 10/9 seen by Dr. Quijano and noted to have runs of Atach on holter monitor and plan was for EP study and ablation. Today patient presented to [facility] and noted to have palpitations, SOB at rest over last 6 days and TWI on EKG done. Sent to ED. Per patient [place] and had mild SOB prior to this trip 6 days ago which worsened during her stay. ET only about 10 steps but formerly multiple blocks. Denies orthopnea to me (endorsed to other provider). She states SOB is specifically when she gets palpitations which have started to have more in frequency and severity. At this point it is limiting her ADLs. Of note she also had CP left sided, nonradiating 3 days ago occurring overnight. This self resolved on its own and she hasn't had since. Denies hx prior CHF. Endorses dysuria 5 days ago now resolved, but denies fevers, chills, polyuria, foul smelling urine. Also endorses leg swelling b/l but now improved today. She only noticed this yesterday. (29 Oct 2020 21:07)      PAST MEDICAL & SURGICAL HISTORY:  Atrial tachycardia    Ventricular septal defect    History of orthostatic hypotension    Single kidney    S/p nephrectomy  donated kidney- left 1984        General: denies fevers, chills  Skin/Breast: denies rash   Ophthalmologic: denies blurry vision  ENMT: denies throat pain  Respiratory and Thorax: denies cough, denies shortness of breath  Cardiovascular: denies chest pain, palpitations. Denies LE swelling   Gastrointestinal: denies abdominal pain/ nausea/ vomiting/ diarrhea. Denies BRBPR/ melena   Genitourinary: Denies dysuria  Musculoskeletal: Denies mylagias   Neurological: Denies syncope  Psychiatric: Denies mood disturbance   Hematology/Lymphatics: denies bleeding/bruising. Denies skin lumps 	    MEDICATIONS  (STANDING):  heparin  Infusion.  Unit(s)/Hr (16 mL/Hr) IV Continuous <Continuous>  influenza   Vaccine 0.5 milliLiter(s) IntraMuscular once    MEDICATIONS  (PRN):  acetaminophen   Tablet .. 650 milliGRAM(s) Oral every 6 hours PRN Moderate Pain (4 - 6)  heparin   Injectable 7000 Unit(s) IV Push every 6 hours PRN For aPTT less than 40  heparin   Injectable 3500 Unit(s) IV Push every 6 hours PRN For aPTT between 40 - 57      Allergies    No Known Allergies    Intolerances        SOCIAL HISTORY:    FAMILY HISTORY:  Family history of prostate cancer (Father)        Vital Signs Last 24 Hrs  T(C): 36.6 (02 Nov 2020 06:31), Max: 36.8 (01 Nov 2020 20:00)  T(F): 97.8 (02 Nov 2020 06:31), Max: 98.3 (01 Nov 2020 20:00)  HR: 65 (02 Nov 2020 06:31) (63 - 140)  BP: 132/74 (02 Nov 2020 06:31) (111/63 - 132/80)  BP(mean): --  RR: 17 (02 Nov 2020 06:31) (16 - 18)  SpO2: 96% (02 Nov 2020 06:31) (93% - 100%)    PHYSICAL EXAM:    GENERAL: NAD, AAOx3   HEAD:  NC/AT  EYES: EOMI, PERRLA, no scleral icterus  HEENT: Moist mucous membranes  LUNG: Clear to auscultation bilaterally; No rales, rhonchi, wheezing, or rubs  HEART: RRR; No murmurs, rubs, or gallops  ABDOMEN: +BS, ST/ND/NT  EXTREMITIES:  2+ Peripheral Pulses, No clubbing, cyanosis, or edema  LAD: no palpable adenopathy    LABS:                        11.3   7.88  )-----------( 210      ( 02 Nov 2020 06:41 )             36.1     11-02    139  |  107  |  21  ----------------------------<  108<H>  4.3   |  22  |  1.08    Ca    8.8      02 Nov 2020 06:41  Phos  3.8     11-02  Mg     2.4     11-02    TPro  7.1  /  Alb  3.7  /  TBili  0.5  /  DBili  < 0.2  /  AST  26  /  ALT  31  /  AlkPhos  67  11-02    PTT - ( 02 Nov 2020 06:41 )  PTT:66.4 SEC          RADIOLOGY & ADDITIONAL STUDIES: HPI:  65F with PMHx AT s/p failed ablation (not performed in 2015), positive tilt table/orthostatic hypotension, VSD, non-obstructive CAD, PVCs, chronic palpitations, renal donor (s/p L nephrectomy for son) presenting from PST for palpitations and SOB at rest, found to have PE.  Heme consulted for hypercoaguable workup.    Per chart, patient has never had her ablation.  SHe had never had a clot in the past and has never been on AC.  She presented with SOB and palpitations, was planned on having ablation here.  However, CTA Performed demonstrated extensive pulmonary embolus in all 5 lobes, extending proximally on the R from peripheral main pulmonary artery and prox on the left from both central arteries.  US LE showing acute, non occlusive DVT noted in R peroneal and posterior tibial veins, nothing in LLE. Pt started on hep gtt on 10/30. She has not required oxygen. She denies any previous clots in the past.  No recent surgery or travel.  Denied any family history of clotting or of cancer.          PAST MEDICAL & SURGICAL HISTORY:  Atrial tachycardia    Ventricular septal defect    History of orthostatic hypotension    Single kidney    S/p nephrectomy  donated kidney- left 1984      MEDICATIONS  (STANDING):  heparin  Infusion.  Unit(s)/Hr (16 mL/Hr) IV Continuous <Continuous>  influenza   Vaccine 0.5 milliLiter(s) IntraMuscular once    MEDICATIONS  (PRN):  acetaminophen   Tablet .. 650 milliGRAM(s) Oral every 6 hours PRN Moderate Pain (4 - 6)  heparin   Injectable 7000 Unit(s) IV Push every 6 hours PRN For aPTT less than 40  heparin   Injectable 3500 Unit(s) IV Push every 6 hours PRN For aPTT between 40 - 57      Allergies    No Known Allergies    Intolerances        SOCIAL HISTORY:    FAMILY HISTORY:  Family history of prostate cancer (Father)        Vital Signs Last 24 Hrs  T(C): 36.6 (02 Nov 2020 06:31), Max: 36.8 (01 Nov 2020 20:00)  T(F): 97.8 (02 Nov 2020 06:31), Max: 98.3 (01 Nov 2020 20:00)  HR: 65 (02 Nov 2020 06:31) (63 - 140)  BP: 132/74 (02 Nov 2020 06:31) (111/63 - 132/80)  BP(mean): --  RR: 17 (02 Nov 2020 06:31) (16 - 18)  SpO2: 96% (02 Nov 2020 06:31) (93% - 100%)    PHYSICAL EXAM:    GENERAL: NAD, AAOx3   HEAD:  NC/AT  EYES: EOMI, PERRLA, no scleral icterus  HEENT: Moist mucous membranes  LUNG: Clear to auscultation bilaterally; No rales, rhonchi, wheezing, or rubs  HEART: RRR; No murmurs, rubs, or gallops  ABDOMEN: +BS, ST/ND/NT  EXTREMITIES:  2+ Peripheral Pulses, No clubbing, cyanosis, or edema  LAD: no palpable adenopathy    LABS:                        11.3   7.88  )-----------( 210      ( 02 Nov 2020 06:41 )             36.1     11-02    139  |  107  |  21  ----------------------------<  108<H>  4.3   |  22  |  1.08    Ca    8.8      02 Nov 2020 06:41  Phos  3.8     11-02  Mg     2.4     11-02    TPro  7.1  /  Alb  3.7  /  TBili  0.5  /  DBili  < 0.2  /  AST  26  /  ALT  31  /  AlkPhos  67  11-02    PTT - ( 02 Nov 2020 06:41 )  PTT:66.4 SEC          RADIOLOGY & ADDITIONAL STUDIES: HPI:  65F with PMHx AT s/p failed ablation (not performed in 2015), positive tilt table/orthostatic hypotension, VSD, non-obstructive CAD, PVCs, chronic palpitations, renal donor (s/p L nephrectomy for son) presenting from PST for palpitations and SOB at rest, found to have PE.  Heme consulted for hypercoaguable workup.    Per chart, patient has never had her ablation.  SHe had never had a clot in the past and has never been on AC.  She presented with SOB and palpitations, was planned on having ablation here.  However, CTA Performed demonstrated extensive pulmonary embolus in all 5 lobes, extending proximally on the R from peripheral main pulmonary artery and prox on the left from both central arteries.  US LE showing acute, non occlusive DVT noted in R peroneal and posterior tibial veins, nothing in LLE. Pt started on hep gtt on 10/30. She has not required oxygen. She denies any previous clots in the past.  No recent surgery.  Reported recent travel with 4-6 hour flight.  Denied any family history of clotting or of cancer.          PAST MEDICAL & SURGICAL HISTORY:  Atrial tachycardia    Ventricular septal defect    History of orthostatic hypotension    Single kidney    S/p nephrectomy  donated kidney- left 1984      MEDICATIONS  (STANDING):  heparin  Infusion.  Unit(s)/Hr (16 mL/Hr) IV Continuous <Continuous>  influenza   Vaccine 0.5 milliLiter(s) IntraMuscular once    MEDICATIONS  (PRN):  acetaminophen   Tablet .. 650 milliGRAM(s) Oral every 6 hours PRN Moderate Pain (4 - 6)  heparin   Injectable 7000 Unit(s) IV Push every 6 hours PRN For aPTT less than 40  heparin   Injectable 3500 Unit(s) IV Push every 6 hours PRN For aPTT between 40 - 57      Allergies    No Known Allergies    Intolerances        SOCIAL HISTORY:    FAMILY HISTORY:  Family history of prostate cancer (Father)        Vital Signs Last 24 Hrs  T(C): 36.6 (02 Nov 2020 06:31), Max: 36.8 (01 Nov 2020 20:00)  T(F): 97.8 (02 Nov 2020 06:31), Max: 98.3 (01 Nov 2020 20:00)  HR: 65 (02 Nov 2020 06:31) (63 - 140)  BP: 132/74 (02 Nov 2020 06:31) (111/63 - 132/80)  BP(mean): --  RR: 17 (02 Nov 2020 06:31) (16 - 18)  SpO2: 96% (02 Nov 2020 06:31) (93% - 100%)    PHYSICAL EXAM:    GENERAL: NAD, AAOx3   HEAD:  NC/AT  EYES: EOMI, PERRLA, no scleral icterus  HEENT: Moist mucous membranes  LUNG: Clear to auscultation bilaterally; No rales, rhonchi, wheezing, or rubs  HEART: RRR; No murmurs, rubs, or gallops  ABDOMEN: +BS, ST/ND/NT  EXTREMITIES:  2+ Peripheral Pulses, No clubbing, cyanosis, or edema  LAD: no palpable adenopathy    LABS:                        11.3   7.88  )-----------( 210      ( 02 Nov 2020 06:41 )             36.1     11-02    139  |  107  |  21  ----------------------------<  108<H>  4.3   |  22  |  1.08    Ca    8.8      02 Nov 2020 06:41  Phos  3.8     11-02  Mg     2.4     11-02    TPro  7.1  /  Alb  3.7  /  TBili  0.5  /  DBili  < 0.2  /  AST  26  /  ALT  31  /  AlkPhos  67  11-02    PTT - ( 02 Nov 2020 06:41 )  PTT:66.4 SEC          RADIOLOGY & ADDITIONAL STUDIES:

## 2020-11-02 NOTE — PROGRESS NOTE ADULT - ASSESSMENT
Assessment and Plan    65F with PMHx AT s/p failed ablation (not performed in 2015), positive tilt table/orthostatic hypotension, VSD, non-obstructive CAD, PVCs, chronic palpitations, renal donor (s/p L nephrectomy for son) presenting from [place] for palpitations and SOB with exertion    EKG: NSR with twave inversions in leads AVF, III, V3-V6  Tele: NSR 60-70s with PAT    1. SOB  -patient states has SOB on exertion for past week. improving today  -CTA chest with extensive PE in all 5 lobes. on hep gtt. f/u MICU consult appreciated   -currently hemodynamically stable and not requiring oxygen at rest  -right heart strain noted on CTA chest, echo with Dilated RV and Decreased RV function , No troponin elevation , RV normal on MRI on 9/23/2020   -LE dopplers with acute Rt peroneal and PT vein DVT   -c/w heparin , discussed catheter directed lytics with family and Dr Nhung ruiz, given hemodynamic stability and Pt not hypoxic will hold off for now   - f/u heme eval for hypercoagulable work up      2. Palpitations  -patient diagnosed with AT and was getting workup for outpatient ablation  -no current plans for ablation   -f/u EP    3. Transaminitis  -patient with elevated LFTs  -denies abdominal pain  -abd US negative

## 2020-11-02 NOTE — CONSULT NOTE ADULT - ASSESSMENT
65F with PMHx AT s/p failed ablation (not performed in 2015), positive tilt table/orthostatic hypotension, VSD, non-obstructive CAD, PVCs, chronic palpitations, renal donor (s/p L nephrectomy for son) presenting from PST for palpitations and SOB at rest, found to have PE.  Heme consulted for hypercoaguable workup.    #Extensive PE with new DVT  - currently on hep gtt  - no interventions planned at this time  - rest of recs pending discussion with attending.    Pamela Lezama DO  Hematology/Oncology Fellow, PGY6  Pager: [phone] 65F with PMHx AT s/p failed ablation (not performed in 2015), positive tilt table/orthostatic hypotension, VSD, non-obstructive CAD, PVCs, chronic palpitations, renal donor (s/p L nephrectomy for son) presenting from PST for palpitations and SOB at rest, found to have PE.  Heme consulted for hypercoaguable workup.    #Extensive PE with new DVT  - currently on hep gtt  - no interventions planned at this time  - please send D-Dimer (to assess baseline D-Dimer)  - check CT A/P to rule out occult malignancy.  many occult cancers can be found after recent clots.   - send Lupus AC, B2 glycoprotein, anti cardiolipin antibodies, however even if positive they will need to be repeated in 12 weeks to confirm diagnosis  - this is likely a provoked PE from travel vs her atrial tachycardia, but can send hypercoag workup.  She will need to follow up results as an outpatient with hematology.  We will refer her upon discharge, however if she does not hear from the office within 5-7 days she can call [phone].    - when ready to be transitioned, can be transitioned to DOAC, however would send to her insurance prior to discharge to ensure coverage.    aPmela Lezama DO  Hematology/Oncology Fellow, PGY6  Pager: [phone]

## 2020-11-02 NOTE — PROGRESS NOTE ADULT - SUBJECTIVE AND OBJECTIVE BOX
Syed Mckay MD  Interventional Cardiology / Endovascular Specialist  [place] Office : [address] [place] [address] [place]. [address]  Tel:   [place] Office : [address] [facility] [place] [address]  Tel: [phone]  Cell : [phone]    Subjective/Overnight events: Patient seen sitting up in bed comfortably. Currently not having chest pain. States SOB improving, was able to walk to bathroom and back  	  MEDICATIONS:  heparin   Injectable 7000 Unit(s) IV Push every 6 hours PRN  heparin   Injectable 3500 Unit(s) IV Push every 6 hours PRN  heparin  Infusion.  Unit(s)/Hr IV Continuous <Continuous>        acetaminophen   Tablet .. 650 milliGRAM(s) Oral every 6 hours PRN        influenza   Vaccine 0.5 milliLiter(s) IntraMuscular once      PAST MEDICAL/SURGICAL HISTORY  PAST MEDICAL & SURGICAL HISTORY:  Atrial tachycardia    Ventricular septal defect    History of orthostatic hypotension    Single kidney    S/p nephrectomy  donated kidney- left 1984        SOCIAL HISTORY: Substance Use (street drugs): ( x ) never used  (  ) other:    FAMILY HISTORY:  Family history of prostate cancer (Father)        REVIEW OF SYSTEMS:  CONSTITUTIONAL: No fever, weight loss, or fatigue  EYES: No eye pain, visual disturbances, or discharge  ENMT:  No difficulty hearing, tinnitus, vertigo; No sinus or throat pain  BREASTS: No pain, masses, or nipple discharge  GASTROINTESTINAL: No abdominal or epigastric pain. No nausea, vomiting, or hematemesis; No diarrhea or constipation. No melena or hematochezia.  GENITOURINARY: No dysuria, frequency, hematuria, or incontinence  NEUROLOGICAL: No headaches, memory loss, loss of strength, numbness, or tremors  ENDOCRINE: No heat or cold intolerance; No hair loss  MUSCULOSKELETAL: No joint pain or swelling; No muscle, back, or extremity pain  PSYCHIATRIC: No depression, anxiety, mood swings, or difficulty sleeping  HEME/LYMPH: No easy bruising, or bleeding gums  All others negative    PHYSICAL EXAM:  T(C): 37 (11-02-20 @ 11:31), Max: 37 (11-02-20 @ 11:31)  HR: 71 (11-02-20 @ 11:31) (63 - 140)  BP: 133/64 (11-02-20 @ 11:31) (111/63 - 133/64)  RR: 17 (11-02-20 @ 11:31) (16 - 18)  SpO2: 95% (11-02-20 @ 11:31) (93% - 96%)  Wt(kg): --  I&O's Summary    01 Nov 2020 07:01  -  02 Nov 2020 07:00  --------------------------------------------------------  IN: 1366 mL / OUT: 800 mL / NET: 566 mL    02 Nov 2020 07:01  -  02 Nov 2020 13:37  --------------------------------------------------------  IN: 631 mL / OUT: 1 mL / NET: 630 mL        GENERAL: NAD, well-groomed, well-developed  EYES: EOMI, PERRLA, conjunctiva and sclera clear  ENMT: No tonsillar erythema, exudates, or enlargement  Cardiovascular: Normal S1 S2, No JVD, No murmurs, No edema  Respiratory: Lungs diminished to auscultation	  Gastrointestinal:  Soft, Non-tender, + BS	  Extremities: Normal range of motion, No clubbing, cyanosis or edema  LYMPH: No lymphadenopathy noted  NERVOUS SYSTEM:  Alert & Oriented X3                                  11.3   7.88  )-----------( 210      ( 02 Nov 2020 06:41 )             36.1     11-02    139  |  107  |  21  ----------------------------<  108<H>  4.3   |  22  |  1.08    Ca    8.8      02 Nov 2020 06:41  Phos  3.8     11-02  Mg     2.4     11-02    TPro  7.1  /  Alb  3.7  /  TBili  0.5  /  DBili  < 0.2  /  AST  26  /  ALT  31  /  AlkPhos  67  11-02    proBNP:   Lipid Profile:   HgA1c:   TSH:     Consultant(s) Notes Reviewed:  [x ] YES  [ ] NO    Care Discussed with Consultants/Other Providers [ x] YES  [ ] NO    Imaging Personally Reviewed independently:  [x] YES  [ ] NO    All labs, radiologic studies, vitals, orders and medications list reviewed. Patient is seen and examined at bedside. Case discussed with medical team.

## 2020-11-02 NOTE — PROGRESS NOTE ADULT - SUBJECTIVE AND OBJECTIVE BOX
CHIEF COMPLAINT: PE    Interval Events: The patient states she is feeling better. Denies dyspnea at this time.     REVIEW OF SYSTEMS:  [x] All other systems negative  [ ] Unable to assess ROS because ________    OBJECTIVE:  ICU Vital Signs Last 24 Hrs  T(C): 37 (02 Nov 2020 11:31), Max: 37 (02 Nov 2020 11:31)  T(F): 98.6 (02 Nov 2020 11:31), Max: 98.6 (02 Nov 2020 11:31)  HR: 71 (02 Nov 2020 11:31) (63 - 140)  BP: 133/64 (02 Nov 2020 11:31) (111/63 - 133/64)  BP(mean): --  ABP: --  ABP(mean): --  RR: 17 (02 Nov 2020 11:31) (16 - 18)  SpO2: 95% (02 Nov 2020 11:31) (93% - 96%)        11-01 @ 07:01  -  11-02 @ 07:00  --------------------------------------------------------  IN: 1366 mL / OUT: 800 mL / NET: 566 mL    11-02 @ 07:01  -  11-02 @ 14:00  --------------------------------------------------------  IN: 631 mL / OUT: 1 mL / NET: 630 mL      CAPILLARY BLOOD GLUCOSE          PHYSICAL EXAM:  GENERAL: NAD, well-groomed, well-developed  ENMT: No tonsillar erythema, exudates, or enlargement; Moist mucous membranes, Good dentition, No lesions  NECK: Supple, No JVD, Normal thyroid  CHEST/LUNG: Clear to percussion bilaterally; No rales, rhonchi, wheezing, or rubs  HEART: Regular rate and rhythm; No murmurs, rubs, or gallops  ABDOMEN: Soft, Nontender, Nondistended; Bowel sounds present  VASCULAR:  2+ Peripheral Pulses, No clubbing, cyanosis, or edema  LYMPH: No lymphadenopathy noted  SKIN: No rashes or lesions  NERVOUS SYSTEM:  Alert & Oriented X3, Good concentration; Motor Strength 5/5 B/L upper and lower extremities    HOSPITAL MEDICATIONS:  MEDICATIONS  (STANDING):  heparin  Infusion.  Unit(s)/Hr (16 mL/Hr) IV Continuous <Continuous>  influenza   Vaccine 0.5 milliLiter(s) IntraMuscular once    MEDICATIONS  (PRN):  acetaminophen   Tablet .. 650 milliGRAM(s) Oral every 6 hours PRN Moderate Pain (4 - 6)  heparin   Injectable 7000 Unit(s) IV Push every 6 hours PRN For aPTT less than 40  heparin   Injectable 3500 Unit(s) IV Push every 6 hours PRN For aPTT between 40 - 57      LABS:                        11.3   7.88  )-----------( 210      ( 02 Nov 2020 06:41 )             36.1     11-02    139  |  107  |  21  ----------------------------<  108<H>  4.3   |  22  |  1.08    Ca    8.8      02 Nov 2020 06:41  Phos  3.8     11-02  Mg     2.4     11-02    TPro  7.1  /  Alb  3.7  /  TBili  0.5  /  DBili  < 0.2  /  AST  26  /  ALT  31  /  AlkPhos  67  11-02    PTT - ( 02 Nov 2020 06:41 )  PTT:66.4 SEC          MICROBIOLOGY:     RADIOLOGY:  [x ] Reviewed and interpreted by me    < from: CT Angio Chest w/ IV Cont (10.29.20 @ 23:38) >  FINDINGS:    PULMONARY ARTERIES: Pulmonary emboli in all 5 lobes extending proximally on the right from the peripheral main pulmonary artery and proximally on the left from both central lumbar arteries.    AIRWAYS AND LUNGS: The central tracheobronchial tree is patent.  Right upper lobe 6 mm nodule.    MEDIASTINUM AND PLEURA: There are no enlarged mediastinal, hilar or axillary lymph nodes. The visualized portion of the thyroid gland is unremarkable. There is no pleural effusion. There is no pneumothorax.    HEART AND VESSELS: The heart is normal in size. Prominent right heart with enlarged pulmonary artery.  There are no atherosclerotic calcifications of the aorta.  There is no pericardial effusion.    UPPER ABDOMEN: Images of the upper abdomen demonstrate right periaortic postsurgical changes. Left hepatic cyst.    BONES AND SOFT TISSUES: The bones are unremarkable.  The soft tissues are unremarkable.    TUBES/LINES: None.    IMPRESSION:  Extensive pulmonary embolism. Suggestion right heart strain. Echo recommended for complete evaluation.    Right upper lobe 6 mm nodule is indeterminate. 6 month follow-up CT recommended for complete evaluation.    < end of copied text >      < from: Transthoracic Echocardiogram (10.30.20 @ 11:07) >  DIMENSIONS:  Dimensions:     Normal Values:  LA:     3.2 cm    2.0 - 4.0 cm  Ao:     4.0 cm    2.0 - 3.8 cm  SEPTUM: 0.8 cm    0.6 - 1.2 cm  PWT:    0.8 cm0.6 - 1.1 cm  LVIDd:  4.2 cm    3.0 - 5.6 cm  LVIDs:  2.5 cm    1.8 - 4.0 cm  Derived Variables:  LVMI: 51 g/m2  RWT: 0.38  Fractional short: 40 %  Ejection Fraction (Teicholtz): 72 %  ------------------------------------------------------------------------  OBSERVATIONS:  Mitral Valve: Normal mitral valve.  Aortic Root: Upper limits of normal aortic root size for  BSA.  Aortic Valve: Normal trileaflet aortic valve. Mild aortic  regurgitation.  Left Atrium: Normal left atrium.  LA volume index = 28  cc/m2.  Left Ventricle: Normal left ventricular systolic function.  No segmental wall motion abnormalities. Normal left  ventricular internal dimensions and wall thicknesses.  (DT:401 ms).  Right Heart: Mild right atrial enlargement. Right  ventricular enlargement with decreased right ventricular  systolic function. Normal tricuspid valve.  Mild-moderate  tricuspid regurgitation. Normal pulmonic valve.  Pericardium/PleuraNormal pericardium with no pericardial  effusion.  ------------------------------------------------------------------------  CONCLUSIONS:  1. Normal trileaflet aortic valve. Mild aortic  regurgitation.  2. Upper limits of normal aortic root size for BSA.  3. Normal left ventricular systolic function. No segmental  wall motion abnormalities.  4. Right ventricular enlargement with decreased right  ventricular systolic function.  5. Normal tricuspid valve.  Mild-moderate tricuspid  regurgitation.    < end of copied text >      Point of Care Ultrasound Findings:    PFT:    EKG:

## 2020-11-03 LAB
ALBUMIN SERPL ELPH-MCNC: 3.7 G/DL — SIGNIFICANT CHANGE UP (ref 3.3–5)
ALP SERPL-CCNC: 68 U/L — SIGNIFICANT CHANGE UP (ref 40–120)
ALT FLD-CCNC: 60 U/L — HIGH (ref 4–33)
ANION GAP SERPL CALC-SCNC: 13 MMO/L — SIGNIFICANT CHANGE UP (ref 7–14)
APTT BLD: 77.7 SEC — HIGH (ref 27–36.3)
AST SERPL-CCNC: 58 U/L — HIGH (ref 4–32)
BILIRUB SERPL-MCNC: 0.3 MG/DL — SIGNIFICANT CHANGE UP (ref 0.2–1.2)
BUN SERPL-MCNC: 26 MG/DL — HIGH (ref 7–23)
CALCIUM SERPL-MCNC: 9 MG/DL — SIGNIFICANT CHANGE UP (ref 8.4–10.5)
CHLORIDE SERPL-SCNC: 105 MMOL/L — SIGNIFICANT CHANGE UP (ref 98–107)
CO2 SERPL-SCNC: 22 MMOL/L — SIGNIFICANT CHANGE UP (ref 22–31)
CREAT SERPL-MCNC: 1.11 MG/DL — SIGNIFICANT CHANGE UP (ref 0.5–1.3)
D DIMER BLD IA.RAPID-MCNC: 2562 NG/ML — SIGNIFICANT CHANGE UP
GLUCOSE SERPL-MCNC: 107 MG/DL — HIGH (ref 70–99)
HCT VFR BLD CALC: 36.3 % — SIGNIFICANT CHANGE UP (ref 34.5–45)
HGB BLD-MCNC: 11.2 G/DL — LOW (ref 11.5–15.5)
INR BLD: 1.05 — SIGNIFICANT CHANGE UP (ref 0.88–1.16)
MAGNESIUM SERPL-MCNC: 2.3 MG/DL — SIGNIFICANT CHANGE UP (ref 1.6–2.6)
MCHC RBC-ENTMCNC: 26.7 PG — LOW (ref 27–34)
MCHC RBC-ENTMCNC: 30.9 % — LOW (ref 32–36)
MCV RBC AUTO: 86.4 FL — SIGNIFICANT CHANGE UP (ref 80–100)
NRBC # FLD: 0 K/UL — SIGNIFICANT CHANGE UP (ref 0–0)
PHOSPHATE SERPL-MCNC: 5.1 MG/DL — HIGH (ref 2.5–4.5)
PLATELET # BLD AUTO: 216 K/UL — SIGNIFICANT CHANGE UP (ref 150–400)
PMV BLD: 11.6 FL — SIGNIFICANT CHANGE UP (ref 7–13)
POTASSIUM SERPL-MCNC: 4.5 MMOL/L — SIGNIFICANT CHANGE UP (ref 3.5–5.3)
POTASSIUM SERPL-SCNC: 4.5 MMOL/L — SIGNIFICANT CHANGE UP (ref 3.5–5.3)
PROT SERPL-MCNC: 7.1 G/DL — SIGNIFICANT CHANGE UP (ref 6–8.3)
PROTHROM AB SERPL-ACNC: 12.1 SEC — SIGNIFICANT CHANGE UP (ref 10.6–13.6)
RBC # BLD: 4.2 M/UL — SIGNIFICANT CHANGE UP (ref 3.8–5.2)
RBC # FLD: 15.3 % — HIGH (ref 10.3–14.5)
SARS-COV-2 IGG SERPL IA-ACNC: 0.4 RATIO — SIGNIFICANT CHANGE UP
SARS-COV-2 IGG SERPL QL IA: NEGATIVE — SIGNIFICANT CHANGE UP
SARS-COV-2 IGG SERPL QL IA: NEGATIVE — SIGNIFICANT CHANGE UP
SARS-COV-2 IGM SERPL IA-ACNC: 0.2 RATIO — SIGNIFICANT CHANGE UP
SODIUM SERPL-SCNC: 140 MMOL/L — SIGNIFICANT CHANGE UP (ref 135–145)
THROMBIN TIME: > 300 SEC — HIGH (ref 16–26)
TSH SERPL-MCNC: 2.23 UIU/ML — SIGNIFICANT CHANGE UP (ref 0.27–4.2)
WBC # BLD: 7.65 K/UL — SIGNIFICANT CHANGE UP (ref 3.8–10.5)
WBC # FLD AUTO: 7.65 K/UL — SIGNIFICANT CHANGE UP (ref 3.8–10.5)

## 2020-11-03 PROCEDURE — 99232 SBSQ HOSP IP/OBS MODERATE 35: CPT

## 2020-11-03 RX ORDER — RIVAROXABAN 15 MG-20MG
15 KIT ORAL
Refills: 0 | Status: DISCONTINUED | OUTPATIENT
Start: 2020-11-03 | End: 2020-11-06

## 2020-11-03 RX ORDER — METOPROLOL TARTRATE 50 MG
25 TABLET ORAL DAILY
Refills: 0 | Status: DISCONTINUED | OUTPATIENT
Start: 2020-11-03 | End: 2020-11-06

## 2020-11-03 RX ADMIN — HEPARIN SODIUM 1300 UNIT(S)/HR: 5000 INJECTION INTRAVENOUS; SUBCUTANEOUS at 07:27

## 2020-11-03 RX ADMIN — Medication 25 MILLIGRAM(S): at 22:12

## 2020-11-03 RX ADMIN — RIVAROXABAN 15 MILLIGRAM(S): KIT at 21:06

## 2020-11-03 RX ADMIN — CEFTRIAXONE 100 MILLIGRAM(S): 500 INJECTION, POWDER, FOR SOLUTION INTRAMUSCULAR; INTRAVENOUS at 15:13

## 2020-11-03 NOTE — PROGRESS NOTE ADULT - SUBJECTIVE AND OBJECTIVE BOX
Syed Mckay MD  Interventional Cardiology / Endovascular Specialist  [place] Office : [address] [place] [address] [place] [address]  Tel:   [place] Office : [address] [facility] [place] [address]  Tel: [phone]  Cell : [phone]    Subjective/Overnight events: Patient seen sitting up in bed comfortably. Currently not having chest pain. States SOB improving  	  MEDICATIONS:  heparin   Injectable 7000 Unit(s) IV Push every 6 hours PRN  heparin   Injectable 3500 Unit(s) IV Push every 6 hours PRN  heparin  Infusion.  Unit(s)/Hr IV Continuous <Continuous>    cefTRIAXone   IVPB      cefTRIAXone   IVPB 1000 milliGRAM(s) IV Intermittent every 24 hours      acetaminophen   Tablet .. 650 milliGRAM(s) Oral every 6 hours PRN        influenza   Vaccine 0.5 milliLiter(s) IntraMuscular once      PAST MEDICAL/SURGICAL HISTORY  PAST MEDICAL & SURGICAL HISTORY:  Atrial tachycardia    Ventricular septal defect    History of orthostatic hypotension    Single kidney    S/p nephrectomy  donated kidney- left 1984        SOCIAL HISTORY: Substance Use (street drugs): ( x ) never used  (  ) other:    FAMILY HISTORY:  Family history of prostate cancer (Father)        REVIEW OF SYSTEMS:  CONSTITUTIONAL: No fever, weight loss, or fatigue  EYES: No eye pain, visual disturbances, or discharge  ENMT:  No difficulty hearing, tinnitus, vertigo; No sinus or throat pain  BREASTS: No pain, masses, or nipple discharge  GASTROINTESTINAL: No abdominal or epigastric pain. No nausea, vomiting, or hematemesis; No diarrhea or constipation. No melena or hematochezia.  GENITOURINARY: No dysuria, frequency, hematuria, or incontinence  NEUROLOGICAL: No headaches, memory loss, loss of strength, numbness, or tremors  ENDOCRINE: No heat or cold intolerance; No hair loss  MUSCULOSKELETAL: No joint pain or swelling; No muscle, back, or extremity pain  PSYCHIATRIC: No depression, anxiety, mood swings, or difficulty sleeping  HEME/LYMPH: No easy bruising, or bleeding gums  All others negative    PHYSICAL EXAM:  T(C): 36.7 (11-03-20 @ 05:21), Max: 37 (11-02-20 @ 11:31)  HR: 62 (11-03-20 @ 05:21) (62 - 75)  BP: 119/69 (11-03-20 @ 05:21) (119/69 - 137/62)  RR: 17 (11-03-20 @ 05:21) (16 - 17)  SpO2: 96% (11-03-20 @ 05:21) (94% - 96%)  Wt(kg): --  I&O's Summary    02 Nov 2020 07:01  -  03 Nov 2020 07:00  --------------------------------------------------------  IN: 1083 mL / OUT: 354 mL / NET: 729 mL          GENERAL: NAD, well-groomed, well-developed  EYES: EOMI, PERRLA, conjunctiva and sclera clear  ENMT: No tonsillar erythema, exudates, or enlargement  Cardiovascular: Normal S1 S2, No JVD, No murmurs, No edema  Respiratory: Lungs diminished to auscultation	  Gastrointestinal:  Soft, Non-tender, + BS	  Extremities: Normal range of motion, No clubbing, cyanosis or edema  LYMPH: No lymphadenopathy noted  NERVOUS SYSTEM:  Alert & Oriented X3                                    11.2   7.65  )-----------( 216      ( 03 Nov 2020 05:18 )             36.3     11-03    140  |  105  |  26<H>  ----------------------------<  107<H>  4.5   |  22  |  1.11    Ca    9.0      03 Nov 2020 05:11  Phos  5.1     11-03  Mg     2.3     11-03    TPro  7.1  /  Alb  3.7  /  TBili  0.3  /  DBili  x   /  AST  58<H>  /  ALT  60<H>  /  AlkPhos  68  11-03    proBNP:   Lipid Profile:   HgA1c:   TSH: Thyroid Stimulating Hormone, Serum: 2.23 uIU/mL (11-03 @ 05:11)      Consultant(s) Notes Reviewed:  [x ] YES  [ ] NO    Care Discussed with Consultants/Other Providers [ x] YES  [ ] NO    Imaging Personally Reviewed independently:  [x] YES  [ ] NO    All labs, radiologic studies, vitals, orders and medications list reviewed. Patient is seen and examined at bedside. Case discussed with medical team.

## 2020-11-03 NOTE — PROGRESS NOTE ADULT - SUBJECTIVE AND OBJECTIVE BOX
Interval History:  Patient resting comfortably in bed   Denies CP/SOB/palpitations/dizziness.  No acute events overnight.    Medications:  acetaminophen   Tablet .. 650 milliGRAM(s) Oral every 6 hours PRN  cefTRIAXone   IVPB      cefTRIAXone   IVPB 1000 milliGRAM(s) IV Intermittent every 24 hours  heparin   Injectable 7000 Unit(s) IV Push every 6 hours PRN  heparin   Injectable 3500 Unit(s) IV Push every 6 hours PRN  heparin  Infusion.  Unit(s)/Hr IV Continuous <Continuous>  influenza   Vaccine 0.5 milliLiter(s) IntraMuscular once      Vitals:  T(C): 36.6 (11-03-20 @ 11:47), Max: 36.8 (11-02-20 @ 21:19)  HR: 63 (11-03-20 @ 11:47) (62 - 75)  BP: 126/67 (11-03-20 @ 11:47) (119/69 - 137/62)  BP(mean): --  RR: 17 (11-03-20 @ 11:47) (16 - 17)  SpO2: 95% (11-03-20 @ 11:47) (94% - 96%)    Daily     Daily         I&O's Summary    02 Nov 2020 07:01  -  03 Nov 2020 07:00  --------------------------------------------------------  IN: 1083 mL / OUT: 354 mL / NET: 729 mL    03 Nov 2020 07:01  -  03 Nov 2020 13:42  --------------------------------------------------------  IN: 239 mL / OUT: 100 mL / NET: 139 mL        Physical Exam:  Appearance: No Acute Distress  HEENT: PERRL  Cardiovascular: Normal S1 S2, No murmurs/rubs/gallops  Respiratory: Clear to auscultation bilaterally  Gastrointestinal: Soft, Non-tender	  Skin: No cyanosis	  Neurologic: Non-focal  Extremities: No LE edema  Psychiatry: A & O x 3, Mood & affect appropriate    Labs:                        11.2   7.65  )-----------( 216      ( 03 Nov 2020 05:18 )             36.3     11-03    140  |  105  |  26<H>  ----------------------------<  107<H>  4.5   |  22  |  1.11    Ca    9.0      03 Nov 2020 05:11  Phos  5.1     11-03  Mg     2.3     11-03    TPro  7.1  /  Alb  3.7  /  TBili  0.3  /  DBili  x   /  AST  58<H>  /  ALT  60<H>  /  AlkPhos  68  11-03    PT/INR - ( 03 Nov 2020 05:11 )   PT: 12.1 SEC;   INR: 1.05          PTT - ( 03 Nov 2020 05:11 )  PTT:77.7 SEC        TELEMETRY: Sinus Rhythm     Echocardiogram:   Transthoracic Echocardiogram (10.30.20 @ 11:07)   ------------------------------------------------------------------------  DIMENSIONS:  Dimensions:     Normal Values:  LA:     3.2 cm    2.0 - 4.0 cm  Ao:     4.0 cm    2.0 - 3.8 cm  SEPTUM: 0.8 cm    0.6 - 1.2 cm  PWT:    0.8 cm0.6 - 1.1 cm  LVIDd:  4.2 cm    3.0 - 5.6 cm  LVIDs:  2.5 cm    1.8 - 4.0 cm  Derived Variables:  LVMI: 51 g/m2  RWT: 0.38  Fractional short: 40 %  Ejection Fraction (Yamilicholtz): 72 %  ------------------------------------------------------------------------  OBSERVATIONS:  Mitral Valve: Normal mitral valve.  Aortic Root: Upper limits of normal aortic root size for  BSA.  Aortic Valve: Normal trileaflet aortic valve. Mild aortic  regurgitation.  Left Atrium: Normal left atrium.  LA volume index = 28  cc/m2.  Left Ventricle: Normal left ventricular systolic function.  No segmental wall motion abnormalities. Normal left  ventricular internal dimensions and wall thicknesses.  (DT:401 ms).  Right Heart: Mild right atrial enlargement. Right  ventricular enlargement with decreased right ventricular  systolic function. Normal tricuspid valve.  Mild-moderate  tricuspid regurgitation. Normal pulmonic valve.  Pericardium/PleuraNormal pericardium with no pericardial  effusion.  ------------------------------------------------------------------------  CONCLUSIONS:  1. Normal trileaflet aortic valve. Mild aortic  regurgitation.  2. Upper limits of normal aortic root size for BSA.  3. Normal left ventricular systolic function. No segmental  wall motion abnormalities.  4. Right ventricular enlargement with decreased right  ventricular systolic function.  5. Normal tricuspid valve.  Mild-moderate tricuspid  regurgitation.

## 2020-11-03 NOTE — PROGRESS NOTE ADULT - ASSESSMENT
Problem/Plan - 1:  ·  Problem: Shortness of breath.  Plan:telemonitor  sec to PE   cards and pulmonary fu   cw heparin drip     Problem/Plan - 2:  ·  Problem: Palpitations.  Plan: telemonitor   -f/u EP recs    Problem/Plan - 3:  ·  Problem: Chest pain, unspecified type.  Plan: 2 episodes of chest pain overnight a few days ago, stabbing in nature, intermittent and left sided. Atypical in nature. Trop 27--26  Ischemia thomas as per cards     Problem/Plan - 4:·  Problem: Leg swelling.  Plan: No pitting edema appreciated on exam at this time though patient with RLE calf tendernes and reported edema day prior. She states was pitting  -LE duplex.     Problem/Plan - 5:  ·  Problem: Transaminitis.  Plan: AST 45 ALT 53 and normal one year ago. This may be related to congestive hepatopathy if with CHF though unclear at this time  fu LFTs    Problem/Plan - 6:Problem: Need for prophylactic measure. Plan: Lovenox  Regular diet.

## 2020-11-03 NOTE — PROGRESS NOTE ADULT - ASSESSMENT
Assessment and Plan    65F with PMHx AT s/p failed ablation (not performed in 2015), positive tilt table/orthostatic hypotension, VSD, non-obstructive CAD, PVCs, chronic palpitations, renal donor (s/p L nephrectomy for son) presenting from [facility] for palpitations and SOB with exertion    EKG: NSR with twave inversions in leads AVF, III, V3-V6  Tele: NSR 60-70s with PAT    1. SOB  -patient states has SOB on exertion for past week. improving today  -CTA chest with extensive PE in all 5 lobes. on hep gtt. f/u MICU consult appreciated   -currently hemodynamically stable and not requiring oxygen at rest  -right heart strain noted on CTA chest, echo with Dilated RV and Decreased RV function , No troponin elevation , RV normal on MRI on 9/23/2020   -LE dopplers with acute Rt peroneal and PT vein DVT   -c/w heparin , discussed catheter directed lytics with family and Dr Nhung ruiz, given hemodynamic stability and Pt not hypoxic will hold off for now   -appreciate heme eval for hypercoagulable work up      2. Palpitations  -patient diagnosed with AT and was getting workup for outpatient ablation  -no current plans for ablation   -f/u EP    3. Transaminitis  -patient with elevated LFTs  -denies abdominal pain  -abd US negative

## 2020-11-03 NOTE — PROGRESS NOTE ADULT - ASSESSMENT
65 year old Female with PMH of  Atrial tachycardia s/p failed ablation, positive tilt table/orthostatic hypotension, non-obstructive CAD, PVCs, chronic palpitations, renal donor (s/p L nephrectomy for son). Patient presented to ED from [facility] on 10/29 due to c/o palpitations and SOB at rest. Found to have extensive PE in all five lobes on CTA chest with evidence of RV strain. Started on IV Heparin.  Denies fever/chills/n/v/d, recent travel to the [place]. COVID PCR and Antibody negative.   TTE with Dilated RV and Decreased RV function (of note RV normal on MRI on 9/23/2020). LE dopplers with acute Rt peroneal and PT vein DVT.      Continue telemetry monitoring.  Monitor lytes and replete K>4.0 and Mg>2.0.   Start Xarelto after Heparin gtt  Follow up appointment (reschedule AT ablation) with Dr. Quijano     65 year old Female with PMH of  Atrial tachycardia s/p failed ablation, positive tilt table/orthostatic hypotension, non-obstructive CAD, PVCs, chronic palpitations, renal donor (s/p L nephrectomy for son). Patient presented to ED from [facility] on 10/29 due to c/o palpitations and SOB at rest. Found to have extensive PE in all five lobes on CTA chest with evidence of RV strain. Started on IV Heparin.  Denies fever/chills/n/v/d, recent travel to the [place]. COVID PCR and Antibody negative.   TTE with Dilated RV and Decreased RV function (of note RV normal on MRI on 9/23/2020). LE dopplers with acute Rt peroneal and PT vein DVT.    Continue telemetry monitoring.  Monitor lytes and replete K>4.0 and Mg>2.0.   Start Xarelto after Heparin gtt  Metoprolol 25mg daily    Follow up appointment (reschedule AT ablation) with Dr. Quijano     65 year old Female with PMH of  Atrial tachycardia s/p failed ablation, positive tilt table/orthostatic hypotension, non-obstructive CAD, PVCs, chronic palpitations, renal donor (s/p L nephrectomy for son). Patient presented to ED from [facility] on 10/29 due to c/o palpitations and SOB at rest. Found to have extensive PE in all five lobes on CTA chest with evidence of RV strain. Started on IV Heparin.  Denies fever/chills/n/v/d, recent travel to the [place]. COVID PCR and Antibody negative.   TTE with Dilated RV and Decreased RV function (of note RV normal on MRI on 9/23/2020). LE dopplers with acute Rt peroneal and PT vein DVT.    Continue telemetry monitoring.  Monitor lytes and replete K>4.0 and Mg>2.0.   Start Xarelto after Heparin gtt  Metoprolol 25mg daily    Appreciate Cardiology recommendations    Follow up appointment (reschedule AT ablation) with Dr. Quijano     65 year old Female with PMH of  Atrial tachycardia s/p failed ablation, positive tilt table/orthostatic hypotension, non-obstructive CAD, PVCs, chronic palpitations, renal donor (s/p L nephrectomy for son). Patient presented to ED from [facility] on 10/29 due to c/o palpitations and SOB at rest. Found to have extensive PE in all five lobes on CTA chest with evidence of RV strain. Started on IV Heparin.  Denies fever/chills/n/v/d, recent travel to the [place]. COVID PCR and Antibody negative.   TTE with Dilated RV and Decreased RV function (of note RV normal on MRI on 9/23/2020). LE dopplers with acute Rt peroneal and PT vein DVT.    Continue telemetry monitoring.  Monitor lytes and replete K>4.0 and Mg>2.0.   Start Xarelto after Heparin gtt  Metoprolol 25mg daily-please restart    Appreciate Cardiology recommendations    Follow up appointment (reschedule AT ablation) with Dr. Quijano

## 2020-11-03 NOTE — PROGRESS NOTE ADULT - SUBJECTIVE AND OBJECTIVE BOX
Patient is a 65y old  Female who presents with a chief complaint of SOB and palpitations (03 Nov 2020 08:58)      INTERVAL HPI/OVERNIGHT EVENTS:  T(C): 36.6 (11-03-20 @ 11:47), Max: 36.8 (11-02-20 @ 21:19)  HR: 63 (11-03-20 @ 11:47) (62 - 75)  BP: 126/67 (11-03-20 @ 11:47) (119/69 - 137/62)  RR: 17 (11-03-20 @ 11:47) (16 - 17)  SpO2: 95% (11-03-20 @ 11:47) (94% - 96%)  Wt(kg): --  I&O's Summary    02 Nov 2020 07:01  -  03 Nov 2020 07:00  --------------------------------------------------------  IN: 1083 mL / OUT: 354 mL / NET: 729 mL    03 Nov 2020 07:01  -  03 Nov 2020 13:44  --------------------------------------------------------  IN: 239 mL / OUT: 100 mL / NET: 139 mL        LABS:                        11.2   7.65  )-----------( 216      ( 03 Nov 2020 05:18 )             36.3     11-03    140  |  105  |  26<H>  ----------------------------<  107<H>  4.5   |  22  |  1.11    Ca    9.0      03 Nov 2020 05:11  Phos  5.1     11-03  Mg     2.3     11-03    TPro  7.1  /  Alb  3.7  /  TBili  0.3  /  DBili  x   /  AST  58<H>  /  ALT  60<H>  /  AlkPhos  68  11-03    PT/INR - ( 03 Nov 2020 05:11 )   PT: 12.1 SEC;   INR: 1.05          PTT - ( 03 Nov 2020 05:11 )  PTT:77.7 SEC    CAPILLARY BLOOD GLUCOSE                MEDICATIONS  (STANDING):  cefTRIAXone   IVPB      cefTRIAXone   IVPB 1000 milliGRAM(s) IV Intermittent every 24 hours  heparin  Infusion.  Unit(s)/Hr (16 mL/Hr) IV Continuous <Continuous>  influenza   Vaccine 0.5 milliLiter(s) IntraMuscular once    MEDICATIONS  (PRN):  acetaminophen   Tablet .. 650 milliGRAM(s) Oral every 6 hours PRN Moderate Pain (4 - 6)  heparin   Injectable 7000 Unit(s) IV Push every 6 hours PRN For aPTT less than 40  heparin   Injectable 3500 Unit(s) IV Push every 6 hours PRN For aPTT between 40 - 57          PHYSICAL EXAM:  GENERAL: NAD, well-groomed, well-developed  HEAD:  Atraumatic, Normocephalic  CHEST/LUNG: Clear to percussion bilaterally; No rales, rhonchi, wheezing, or rubs  HEART: Regular rate and rhythm; No murmurs, rubs, or gallops  ABDOMEN: Soft, Nontender, Nondistended; Bowel sounds present  EXTREMITIES:  2+ Peripheral Pulses, No clubbing, cyanosis, or edema  LYMPH: No lymphadenopathy noted  SKIN: No rashes or lesions    Care Discussed with Consultants/Other Providers [ ] YES  [ ] NO

## 2020-11-04 ENCOUNTER — TRANSCRIPTION ENCOUNTER (OUTPATIENT)
Age: 65
End: 2020-11-04

## 2020-11-04 PROBLEM — Q21.0 VENTRICULAR SEPTAL DEFECT: Chronic | Status: ACTIVE | Noted: 2020-10-29

## 2020-11-04 PROBLEM — Z86.79 PERSONAL HISTORY OF OTHER DISEASES OF THE CIRCULATORY SYSTEM: Chronic | Status: ACTIVE | Noted: 2020-10-29

## 2020-11-04 PROBLEM — I47.1 SUPRAVENTRICULAR TACHYCARDIA: Chronic | Status: ACTIVE | Noted: 2020-10-29

## 2020-11-04 LAB
ALBUMIN SERPL ELPH-MCNC: 3.8 G/DL — SIGNIFICANT CHANGE UP (ref 3.3–5)
ALP SERPL-CCNC: 74 U/L — SIGNIFICANT CHANGE UP (ref 40–120)
ALT FLD-CCNC: 88 U/L — HIGH (ref 4–33)
ANION GAP SERPL CALC-SCNC: 11 MMO/L — SIGNIFICANT CHANGE UP (ref 7–14)
APTT BLD: 35.6 SEC — SIGNIFICANT CHANGE UP (ref 27–36.3)
AST SERPL-CCNC: 68 U/L — HIGH (ref 4–32)
BILIRUB SERPL-MCNC: 0.5 MG/DL — SIGNIFICANT CHANGE UP (ref 0.2–1.2)
BUN SERPL-MCNC: 26 MG/DL — HIGH (ref 7–23)
CALCIUM SERPL-MCNC: 9.3 MG/DL — SIGNIFICANT CHANGE UP (ref 8.4–10.5)
CARDIOLIPIN IGM SER-MCNC: 2.09 GPL — SIGNIFICANT CHANGE UP (ref 0–23)
CARDIOLIPIN IGM SER-MCNC: 5.52 MPL — SIGNIFICANT CHANGE UP (ref 0–11)
CHLORIDE SERPL-SCNC: 104 MMOL/L — SIGNIFICANT CHANGE UP (ref 98–107)
CO2 SERPL-SCNC: 20 MMOL/L — LOW (ref 22–31)
CREAT SERPL-MCNC: 1.08 MG/DL — SIGNIFICANT CHANGE UP (ref 0.5–1.3)
DRVVT SCREEN TO CONFIRM RATIO: 0.87 — SIGNIFICANT CHANGE UP (ref 0–1.2)
DRVVT SCREEN TO CONFIRM RATIO: 1.22 — HIGH (ref 0–1.2)
GLUCOSE SERPL-MCNC: 115 MG/DL — HIGH (ref 70–99)
HCT VFR BLD CALC: 38.2 % — SIGNIFICANT CHANGE UP (ref 34.5–45)
HGB BLD-MCNC: 11.8 G/DL — SIGNIFICANT CHANGE UP (ref 11.5–15.5)
MAGNESIUM SERPL-MCNC: 2.3 MG/DL — SIGNIFICANT CHANGE UP (ref 1.6–2.6)
MCHC RBC-ENTMCNC: 26.9 PG — LOW (ref 27–34)
MCHC RBC-ENTMCNC: 30.9 % — LOW (ref 32–36)
MCV RBC AUTO: 87 FL — SIGNIFICANT CHANGE UP (ref 80–100)
NORMALIZED SCT PPP-RTO: 0.9 — SIGNIFICANT CHANGE UP (ref 0.86–1.2)
NORMALIZED SCT PPP-RTO: 0.91 — SIGNIFICANT CHANGE UP (ref 0.86–1.2)
NRBC # FLD: 0 K/UL — SIGNIFICANT CHANGE UP (ref 0–0)
PHOSPHATE SERPL-MCNC: 4.6 MG/DL — HIGH (ref 2.5–4.5)
PLATELET # BLD AUTO: 241 K/UL — SIGNIFICANT CHANGE UP (ref 150–400)
PMV BLD: 11.1 FL — SIGNIFICANT CHANGE UP (ref 7–13)
POTASSIUM SERPL-MCNC: 4.4 MMOL/L — SIGNIFICANT CHANGE UP (ref 3.5–5.3)
POTASSIUM SERPL-SCNC: 4.4 MMOL/L — SIGNIFICANT CHANGE UP (ref 3.5–5.3)
PROT SERPL-MCNC: 7.5 G/DL — SIGNIFICANT CHANGE UP (ref 6–8.3)
RBC # BLD: 4.39 M/UL — SIGNIFICANT CHANGE UP (ref 3.8–5.2)
RBC # FLD: 14.9 % — HIGH (ref 10.3–14.5)
SODIUM SERPL-SCNC: 135 MMOL/L — SIGNIFICANT CHANGE UP (ref 135–145)
WBC # BLD: 7.26 K/UL — SIGNIFICANT CHANGE UP (ref 3.8–10.5)
WBC # FLD AUTO: 7.26 K/UL — SIGNIFICANT CHANGE UP (ref 3.8–10.5)

## 2020-11-04 PROCEDURE — 99232 SBSQ HOSP IP/OBS MODERATE 35: CPT

## 2020-11-04 PROCEDURE — 74177 CT ABD & PELVIS W/CONTRAST: CPT | Mod: 26

## 2020-11-04 RX ORDER — SODIUM CHLORIDE 9 MG/ML
500 INJECTION INTRAMUSCULAR; INTRAVENOUS; SUBCUTANEOUS ONCE
Refills: 0 | Status: DISCONTINUED | OUTPATIENT
Start: 2020-11-04 | End: 2020-11-04

## 2020-11-04 RX ORDER — SODIUM CHLORIDE 9 MG/ML
500 INJECTION INTRAMUSCULAR; INTRAVENOUS; SUBCUTANEOUS ONCE
Refills: 0 | Status: COMPLETED | OUTPATIENT
Start: 2020-11-04 | End: 2020-11-04

## 2020-11-04 RX ORDER — SODIUM CHLORIDE 9 MG/ML
1000 INJECTION INTRAMUSCULAR; INTRAVENOUS; SUBCUTANEOUS
Refills: 0 | Status: DISCONTINUED | OUTPATIENT
Start: 2020-11-04 | End: 2020-11-06

## 2020-11-04 RX ADMIN — Medication 25 MILLIGRAM(S): at 05:17

## 2020-11-04 RX ADMIN — Medication 650 MILLIGRAM(S): at 05:38

## 2020-11-04 RX ADMIN — SODIUM CHLORIDE 500 MILLILITER(S): 9 INJECTION INTRAMUSCULAR; INTRAVENOUS; SUBCUTANEOUS at 13:50

## 2020-11-04 RX ADMIN — Medication 650 MILLIGRAM(S): at 05:15

## 2020-11-04 RX ADMIN — CEFTRIAXONE 100 MILLIGRAM(S): 500 INJECTION, POWDER, FOR SOLUTION INTRAMUSCULAR; INTRAVENOUS at 14:57

## 2020-11-04 RX ADMIN — SODIUM CHLORIDE 75 MILLILITER(S): 9 INJECTION INTRAMUSCULAR; INTRAVENOUS; SUBCUTANEOUS at 16:58

## 2020-11-04 RX ADMIN — RIVAROXABAN 15 MILLIGRAM(S): KIT at 16:58

## 2020-11-04 RX ADMIN — RIVAROXABAN 15 MILLIGRAM(S): KIT at 08:11

## 2020-11-04 NOTE — DISCHARGE NOTE PROVIDER - CARE PROVIDERS DIRECT ADDRESSES
[facility] ED  eMERGENCY dEPARTMENT eNCOUnter   Attending Attestation     Pt Name: Curt Patel  MRN: [id number]  [place] 1944  Date of evaluation: 7/26/19       Curt Patel is a 67 y.o. male who presents with Chest Pain      History: Patient presents with chest pain, no shortness of breath at this time. Patient says that symptoms are intermittent. Patient also denies chest pain at this moment. Patient has improved after given aspirin. Exam: Heart rate and rhythm are regular. Lungs are clear to auscultation bilaterally. Abdomen is soft, nontender. Patient is in no acute distress and appears well. Plan for cardiac work-up. Patient has had PE in the past however patient has no shortness of breath, oxygen saturations 100% on room air, pulse rate is 76. Consider observation for cardiac rule out versus discharge home with close follow-up depending on the rest of the work-up and how the patient's feeling. Patient will need reexamination/reassessment. I performed a history and physical examination of the patient and discussed management with the resident. I reviewed the residents note and agree with the documented findings and plan of care. Any areas of disagreement are noted on the chart. I was personally present for the key portions of any procedures. I have documented in the chart those procedures where I was not present during the key portions. I have personally reviewed all images and agree with the resident's interpretation. I have reviewed the emergency nurses triage note. I agree with the chief complaint, past medical history, past surgical history, allergies, medications, social and family history as documented unless otherwise noted below. Documentation of the HPI, Physical Exam and Medical Decision Making performed by medical students or scribes is based on my personal performance of the HPI, PE and MDM.  For Phys Assistant/ Nurse Practitioner cases/documentation I have ,[email or web address]

## 2020-11-04 NOTE — DISCHARGE NOTE PROVIDER - HOSPITAL COURSE
Ms Jasso is a 65 year old female with PMHx AT s/p failed ablation (not performed in 2015), positive tilt table/orthostatic hypotension, VSD, non-obstructive CAD, PVCs, chronic palpitations, renal donor (s/p L nephrectomy for son) presenting from PST for palpitations and SOB at rest. Admitted to [facility] for further medical management    Shortness of breath.   Plan: SOB for 6 days (starting prior to DR trip) in the setting of more frequent and more severe palpitations.   -ProBNP-2204  -CTA chest with extensive pulmonary embolism. Suggestion right heart strain. Echo recommended for complete evaluation.   Right upper lobe 6 mm nodule is indeterminate. 6 month follow-up CT recommended for complete evaluation  - Theraputic hep gtt.   - MICU consulted appreciated recs  -currently hemodynamically stable and not requiring oxygen at rest  -right heart strain noted on CTA chest, echo with Dilated RV and Decreased RV function , No troponin elevation , RV normal on MRI on 9/23/2020   -LE duplex Acute, non-occlusive deep vein thromboses noted in the right peroneal and posterior tibial veins.  -c/w heparin , discussed catheter directed lytics with family and Dr Nhung ruiz, given hemodynamic stability and Pt not hypoxic will hold off for now   - f/u heme eval for hypercoagulable work up    EKG NSR with TWI in V3-V6, AVF, III. Trop 27--26  TTE - with Dilated RV and Decreased RV function , No troponin elevation , RV normal on MRI on 9/23/2020       -hold off on further lasix for now as clinically appears well, pending TTE. Can likely give lasix 20mg IV qd if confirmed CHF, LE swelling increases, SOB. Note patient has orthostatic hypotension chronically and would be careful not to overdiurese as she is diuretic naive  -EP consult appreciated and plan for ablation  -Monitor and replete electrolytes  -likely will require ischemic eval such as stress test potentially after ablation.      Palpitations.   Plan: Was planned for ablation on 11/3 and instructed to hold metoprolol 25mg qhs for ten days prior  -f/u EP recs  -per EP will have ablation at later date  -resume metoprolol  -Follow up in the office with Dr Quijano 11/30/30 at 12: 30PM    Chest pain, unspecified type.    Plan: 2 episodes of chest pain overnight a few days ago, stabbing in nature, intermittent and left sided. Atypical in nature. Trop 27--26  -ischemic eval with likely stress test once optimized and likely after ablation  -if CP recurs, repeat EKG and troponin  -CTA chest with extensive pulmonary embolism. Suggestion right heart strain. Echo recommended for complete evaluation.   Right upper lobe 6 mm nodule is indeterminate. 6 month follow-up CT recommended for complete evaluation  TTE- EF 72% Normal left ventricular systolic function. No segmental wall motion abnormalities.  Right ventricular enlargement with decreased right  ventricular systolic function.  -Therapeutic IV Heparin  -Hematology consulted , appreciate recommendations      Leg swelling.   Plan: No pitting edema appreciated on exam at this time though patient with RLE calf tenderness and reported edema day prior. She states was pitting  -LE duplex Acute, non-occlusive deep vein thromboses noted in the right peroneal and posterior tibial veins.    Transaminitis.   Plan: AST 45 ALT 53 and normal one year ago. This may be related to congestive hepatopathy if with CHF though unclear at this time  -RUQ US  -hep panel  -repeat LFTs in AM.      Ms Jasso is a 65 year old female with PMHx AT s/p failed ablation (not performed in 2015), positive tilt table/orthostatic hypotension, VSD, non-obstructive CAD, PVCs, chronic palpitations, renal donor (s/p L nephrectomy for son) presenting from PST for palpitations and SOB at rest. Admitted to [facility] for further medical management    Shortness of breath.   Plan: SOB for 6 days in the setting of more frequent and more severe palpitations.   -ProBNP-2204  -EKG NSR with TWI in V3-V6, AVF, III. Trop 27--26  -CTA chest with extensive pulmonary embolism. Suggestion right heart strain. Echo recommended for complete evaluation.   Right upper lobe 6 mm nodule is indeterminate. 6 month follow-up CT recommended for complete evaluation  - Theraputic hep gtt.   - MICU consulted appreciated recs  -currently hemodynamically stable and not requiring oxygen at rest  -right heart strain noted on CTA chest, echo with Dilated RV and Decreased RV function , No troponin elevation , RV normal on MRI on 9/23/2020   -LE duplex Acute, non-occlusive deep vein thromboses noted in the right peroneal and posterior tibial veins.  - discussed catheter directed lytics with family and Dr Nhung ruiz, given hemodynamic stability and Pt not hypoxic will hold off for now   - f/u heme eval for hypercoagulable work up   - CTAP w/IV contrast -------------------------   -Monitor and replete electrolytes  -likely will require ischemic eval such as stress test potentially after ablation.    Palpitations.   Plan: Was planned for ablation on 11/3 and instructed to hold metoprolol 25mg qhs for ten days prior  -EP consult appreciated and plan for ablation at later date  -resume metoprolol  -Follow up in the office with Dr Quijano 11/30/30 at 12: 30PM    Chest pain, unspecified type.    Plan: 2 episodes of chest pain overnight a few days ago, stabbing in nature, intermittent and left sided. Atypical in nature. Trop 27--26  -ischemic eval with likely stress test once optimized and likely after ablation  -if CP recurs, repeat EKG and troponin  -CTA chest with extensive pulmonary embolism. Suggestion right heart strain. Echo recommended for complete evaluation.   Right upper lobe 6 mm nodule is indeterminate. 6 month follow-up CT recommended for complete evaluation  TTE- EF 72% Normal left ventricular systolic function. No segmental wall motion abnormalities.  Right ventricular enlargement with decreased right  ventricular systolic function.  -Therapeutic IV Heparin  -Hematology consulted , appreciate recommendations  -Transitioned to Xarelto    Leg swelling.   Plan: No pitting edema appreciated on exam at this time though patient with RLE calf tenderness and reported edema day prior. She states was pitting  -LE duplex Acute, non-occlusive deep vein thromboses noted in the right peroneal and posterior tibial veins.    Transaminitis.   Plan: AST 45 ALT 53 and normal one year ago. This may be related to congestive hepatopathy if with CHF though unclear at this time  -RUQ US  -hep panel  -repeat LFTs in AM.     Urinary Tract Infection  -urine culture + Klebsiella pneumoniae  - IV  Ceftriaxone (11/2------) for 7 days    Dispo: On ___ this case was reviewed with  ____, the patient is medically stable and optimized for discharge. All medications were reviewed and prescriptions were sent to mutually agreed upon pharmacy.   Ms Jasso is a 65 year old female with PMHx AT s/p failed ablation (not performed in 2015), positive tilt table/orthostatic hypotension, VSD, non-obstructive CAD, PVCs, chronic palpitations, renal donor (s/p L nephrectomy for son) presenting from PST for palpitations and SOB at rest.    Shortness of breath  -ProBNP-2204  -EKG NSR with TWI in V3-V6, AVF, III. Trop 27--26  -CTA chest with extensive pulmonary embolism. Suggestion right heart strain. Right upper lobe 6 mm nodule is indeterminate.   -MICU consulted but not MICU candidate   -Patient will need 6 month follow up for repeat Chest CT for complete evaluation  -S/p hep gtt. Started on Xarelto   -echo with Dilated RV and Decreased RV function, RV normal on MRI on 9/23/2020   -LE duplex Acute, non-occlusive deep vein thromboses noted in the right peroneal and posterior tibial veins.  - catheter directed lytics were discussed with family and Dr Nhung ruiz (cards) however given hemodynamic stability and Pt not hypoxic this was deferred    -Heme followed given hypercoagulable workup- B2 glycoprotein positive. Lupus AC, anti cardiolipin antibodies sent and pending and per Heme will need to be repeated in 12 weeks to confirm diagnosis  - CTAP w/ IV contrast - neg for malignancy   -Plan for outpatient follow up w/ Hematology     Palpitations.   -2/2 atrial tachycardia however given PE will plan for ablation at a later date  -c/w metoprolol  -Follow up in the office with Dr Quijano 11/30/30 at12: 30PM    Chest pain, unspecified type.   -2/2 PE  -CTA chest with extensive pulmonary embolism. Suggestion right heart strain. Echo recommended for complete evaluation.   Right upper lobe 6 mm nodule is indeterminate. 6 month follow-up CT recommended for complete evaluation  TTE- EF 72% Normal left ventricular systolic function. No segmental wall motion abnormalities.  Right ventricular enlargement with decreased right  ventricular systolic function.  -Transitioned to Xarelto    Leg swelling.   -2/2 DVT  -LE duplex Acute, non-occlusive deep vein thromboses noted in the right peroneal and posterior tibial veins.    Transaminitis  -multifactorial in setting of Abx use for UTI and reactive from PE w/right heart strain  -hep panel neg  - GI followed   -US and CT without acute gi pathology of concern  -GI followed and recommended repeat LFTs 1 week post discharge.     Urinary Tract Infection  -urine culture + Klebsiella pneumoniae  - s/p IV Ceftriaxone. Now transitioned to ceftin to complete a course through 11/8    Dispo: On 11/6 this case was reviewed with Dr. Mckay, the patient is medically stable and optimized for discharge. All medications were reviewed and prescriptions were sent to mutually agreed upon pharmacy.   Ms Jasso is a 65 year old female with PMHx AT s/p failed ablation (not performed in 2015), positive tilt table/orthostatic hypotension, VSD, non-obstructive CAD, PVCs, chronic palpitations, renal donor (s/p L nephrectomy for son) presenting from PST for palpitations and SOB at rest.    Shortness of breath  -ProBNP-2204  -EKG NSR with TWI in V3-V6, AVF, III. Trop 27--26  -CTA chest with extensive pulmonary embolism. Suggestion right heart strain. Right upper lobe 6 mm nodule is indeterminate.   -MICU consulted but not MICU candidate   -Patient will need 6 month follow up for repeat Chest CT for complete evaluation  -S/p hep gtt. Started on Xarelto   -echo with Dilated RV and Decreased RV function, RV normal on MRI on 9/23/2020   -LE duplex Acute, non-occlusive deep vein thromboses noted in the right peroneal and posterior tibial veins.  - catheter directed lytics were discussed with family and Dr Nhung ruiz (cards) however given hemodynamic stability and Pt not hypoxic this was deferred    -Heme followed given hypercoagulable workup- B2 glycoprotein positive. Lupus AC, anti cardiolipin antibodies sent and pending and per Heme will need to be repeated in 12 weeks to confirm diagnosis  - CTAP w/ IV contrast - neg for malignancy   -Plan for outpatient follow up w/ Hematology     Palpitations.   -2/2 atrial tachycardia however given PE will plan for ablation at a later date  -c/w metoprolol- dose reduced to 12.5 mg daily given bradycardia   -Follow up in the office with Dr Quijano 11/30/30 at12: 30PM    Chest pain, unspecified type.   -2/2 PE  -CTA chest with extensive pulmonary embolism. Suggestion right heart strain. Echo recommended for complete evaluation.   Right upper lobe 6 mm nodule is indeterminate. 6 month follow-up CT recommended for complete evaluation  TTE- EF 72% Normal left ventricular systolic function. No segmental wall motion abnormalities.  Right ventricular enlargement with decreased right  ventricular systolic function.  -Transitioned to Xarelto    Leg swelling.   -2/2 DVT  -LE duplex Acute, non-occlusive deep vein thromboses noted in the right peroneal and posterior tibial veins.    Transaminitis  -multifactorial in setting of Abx use for UTI and reactive from PE w/right heart strain  -hep panel neg  - GI followed   -US and CT without acute gi pathology of concern  -GI followed and recommended repeat LFTs 1 week post discharge.     Urinary Tract Infection  -urine culture + Klebsiella pneumoniae  - s/p IV Ceftriaxone. Now transitioned to ceftin to complete a course through 11/8    Dispo: On 11/6 this case was reviewed with Dr. Mckay, the patient is medically stable and optimized for discharge. All medications were reviewed and prescriptions were sent to mutually agreed upon pharmacy.

## 2020-11-04 NOTE — PROGRESS NOTE ADULT - SUBJECTIVE AND OBJECTIVE BOX
Syed Mckay MD  Interventional Cardiology / Endovascular Specialist  [place] Office : [address] [place] [address] [place]. [address]  Tel:   [place] Office : [address] [facility] [place] [address]  Tel: [phone]  Cell : [phone]    Subjective/Overnight events: Patient seen sitting up in bed comfortably. Currently not having chest pain. States SOB improving. Off hep gtt, on Xarelto now  	  MEDICATIONS:  metoprolol succinate ER 25 milliGRAM(s) Oral daily  rivaroxaban 15 milliGRAM(s) Oral two times a day with meals    cefTRIAXone   IVPB      cefTRIAXone   IVPB 1000 milliGRAM(s) IV Intermittent every 24 hours      acetaminophen   Tablet .. 650 milliGRAM(s) Oral every 6 hours PRN        influenza   Vaccine 0.5 milliLiter(s) IntraMuscular once      PAST MEDICAL/SURGICAL HISTORY  PAST MEDICAL & SURGICAL HISTORY:  Atrial tachycardia    Ventricular septal defect    History of orthostatic hypotension    Single kidney    S/p nephrectomy  donated kidney- left 1984        SOCIAL HISTORY: Substance Use (street drugs): ( x ) never used  (  ) other:    FAMILY HISTORY:  Family history of prostate cancer (Father)        REVIEW OF SYSTEMS:  CONSTITUTIONAL: No fever, weight loss, or fatigue  EYES: No eye pain, visual disturbances, or discharge  ENMT:  No difficulty hearing, tinnitus, vertigo; No sinus or throat pain  BREASTS: No pain, masses, or nipple discharge  GASTROINTESTINAL: No abdominal or epigastric pain. No nausea, vomiting, or hematemesis; No diarrhea or constipation. No melena or hematochezia.  GENITOURINARY: No dysuria, frequency, hematuria, or incontinence  NEUROLOGICAL: No headaches, memory loss, loss of strength, numbness, or tremors  ENDOCRINE: No heat or cold intolerance; No hair loss  MUSCULOSKELETAL: No joint pain or swelling; No muscle, back, or extremity pain  PSYCHIATRIC: No depression, anxiety, mood swings, or difficulty sleeping  HEME/LYMPH: No easy bruising, or bleeding gums  All others negative    PHYSICAL EXAM:  T(C): 36.4 (11-04-20 @ 11:45), Max: 36.7 (11-03-20 @ 21:29)  HR: 58 (11-04-20 @ 11:45) (58 - 82)  BP: 117/70 (11-04-20 @ 11:45) (117/70 - 151/82)  RR: 18 (11-04-20 @ 11:45) (18 - 18)  SpO2: 98% (11-04-20 @ 11:45) (95% - 98%)  Wt(kg): --  I&O's Summary    03 Nov 2020 07:01  -  04 Nov 2020 07:00  --------------------------------------------------------  IN: 783 mL / OUT: 300 mL / NET: 483 mL          GENERAL: NAD, well-groomed, well-developed  EYES: EOMI, PERRLA, conjunctiva and sclera clear  ENMT: No tonsillar erythema, exudates, or enlargement  Cardiovascular: Normal S1 S2, No JVD, No murmurs, No edema  Respiratory: Lungs diminished to auscultation	  Gastrointestinal:  Soft, Non-tender, + BS	  Extremities: Normal range of motion, No clubbing, cyanosis or edema  LYMPH: No lymphadenopathy noted  NERVOUS SYSTEM:  Alert & Oriented X3                                    11.8   7.26  )-----------( 241      ( 04 Nov 2020 05:55 )             38.2     11-04    135  |  104  |  26<H>  ----------------------------<  115<H>  4.4   |  20<L>  |  1.08    Ca    9.3      04 Nov 2020 05:55  Phos  4.6     11-04  Mg     2.3     11-04    TPro  7.5  /  Alb  3.8  /  TBili  0.5  /  DBili  x   /  AST  68<H>  /  ALT  88<H>  /  AlkPhos  74  11-04    proBNP:   Lipid Profile:   HgA1c:   TSH:     Consultant(s) Notes Reviewed:  [x ] YES  [ ] NO    Care Discussed with Consultants/Other Providers [ x] YES  [ ] NO    Imaging Personally Reviewed independently:  [x] YES  [ ] NO    All labs, radiologic studies, vitals, orders and medications list reviewed. Patient is seen and examined at bedside. Case discussed with medical team.

## 2020-11-04 NOTE — DISCHARGE NOTE PROVIDER - PROVIDER TOKENS
PROVIDER:[TOKEN:[[id number]],FOLLOWUP:[1 week],ESTABLISHEDPATIENT:[T]],PROVIDER:[TOKEN:[[id number]],SCHEDULEDAPPT:[11/30/2020],SCHEDULEDAPPTTIME:[12:30 PM],ESTABLISHEDPATIENT:[T]]

## 2020-11-04 NOTE — PROGRESS NOTE ADULT - ASSESSMENT
65 year old Female with PMH of  Atrial tachycardia s/p failed ablation, positive tilt table/orthostatic hypotension, non-obstructive CAD, PVCs, chronic palpitations, renal donor (s/p L nephrectomy for son). Patient presented to ED from [facility] on 10/29 due to c/o palpitations and SOB at rest. Found to have extensive PE in all five lobes on CTA chest with evidence of RV strain. Started on IV Heparin.  Denies fever/chills/n/v/d, recent travel to the [place]. COVID PCR and Antibody negative.   TTE with Dilated RV and Decreased RV function (of note RV normal on MRI on 9/23/2020). LE dopplers with acute Rt peroneal and PT vein DVT.    Continue telemetry monitoring.  Monitor lytes and replete K>4.0 and Mg>2.0.   Xarelto 15mg twice daily  Metoprolol 25mg daily    Appreciate Cardiology recommendations    Follow up appointment (future AT ablation) with Dr. Quijano  11/30/20 at 12:30pm

## 2020-11-04 NOTE — DISCHARGE NOTE PROVIDER - NSDCFUADDAPPT_GEN_ALL_CORE_FT
If you are in need of a general medicine physician and post-discharge medical follow-up for further care/recommendations you may contact the [facility] for an appointment [phone]    You have an outpatient tele health appointment follow up scheduled for Thursday November 12th

## 2020-11-04 NOTE — PROGRESS NOTE ADULT - ASSESSMENT
Assessment and Plan    65F with PMHx AT s/p failed ablation (not performed in 2015), positive tilt table/orthostatic hypotension, VSD, non-obstructive CAD, PVCs, chronic palpitations, renal donor (s/p L nephrectomy for son) presenting from PST for palpitations and SOB with exertion    EKG: NSR with twave inversions in leads AVF, III, V3-V6  Tele: NSR 60-70s with PAT    1. SOB  -patient states has SOB on exertion for past week. improving today  -CTA chest with extensive PE in all 5 lobes. on hep gtt. f/u MICU consult appreciated   -currently hemodynamically stable and not requiring oxygen at rest  -right heart strain noted on CTA chest, echo with Dilated RV and Decreased RV function , No troponin elevation , RV normal on MRI on 9/23/2020   -LE dopplers with acute Rt peroneal and PT vein DVT   -discussed catheter directed lytics with family and Dr Nhung ruiz, given hemodynamic stability and Pt not hypoxic will hold off for now   -hep gtt switched to Xarelto  -appreciate heme eval for hypercoagulable work up. will obtain CT A/P    2. Palpitations  -patient diagnosed with AT and was getting workup for outpatient ablation  -no current plans for ablation   -f/u EP    3. Transaminitis  -patient with elevated LFTs  -denies abdominal pain  -abd US negative   Assessment and Plan    65F with PMHx AT s/p failed ablation (not performed in 2015), positive tilt table/orthostatic hypotension, VSD, non-obstructive CAD, PVCs, chronic palpitations, renal donor (s/p L nephrectomy for son) presenting from PST for palpitations and SOB with exertion    EKG: NSR with twave inversions in leads AVF, III, V3-V6  Tele: NSR 60-70s with PAT    1. SOB  -patient states has SOB on exertion for past week. improving today  -CTA chest with extensive PE in all 5 lobes. on hep gtt. f/u MICU consult appreciated   -currently hemodynamically stable and not requiring oxygen at rest  -right heart strain noted on CTA chest, echo with Dilated RV and Decreased RV function , No troponin elevation , RV normal on MRI on 9/23/2020   -LE dopplers with acute Rt peroneal and PT vein DVT   -discussed catheter directed lytics with family and Dr Nhung ruiz, given hemodynamic stability and Pt not hypoxic will hold off for now   -hep gtt switched to Xarelto  -appreciate heme eval for hypercoagulable work up. will obtain CT A/P    2. Palpitations  -patient diagnosed with AT and was getting workup for outpatient ablation  -no current plans for ablation   -f/u EP    3. Transaminitis  -patient with elevated LFTs  -denies abdominal pain  -abd US negative    Patient has outpatient tele follow up scheduled for Thursday November 12th   Assessment and Plan    65F with PMHx AT s/p failed ablation (not performed in 2015), positive tilt table/orthostatic hypotension, VSD, non-obstructive CAD, PVCs, chronic palpitations, renal donor (s/p L nephrectomy for son) presenting from PST for palpitations and SOB with exertion    EKG: NSR with twave inversions in leads AVF, III, V3-V6  Tele: NSR 60-70s with PAT    1. SOB  -patient states has SOB on exertion for past week. improving today  -CTA chest with extensive PE in all 5 lobes. on hep gtt. f/u MICU consult appreciated   -currently hemodynamically stable and not requiring oxygen at rest  -right heart strain noted on CTA chest, echo with Dilated RV and Decreased RV function , No troponin elevation , RV normal on MRI on 9/23/2020   -LE dopplers with acute Rt peroneal and PT vein DVT   -hep gtt switched to Xarelto  -appreciate heme eval for hypercoagulable work up. will obtain CT A/P    2. Palpitations  -patient diagnosed with AT and was getting workup for outpatient ablation  -no current plans for ablation   -f/u EP    3. Transaminitis  -patient with elevated LFTs  -denies abdominal pain  -abd US negative    Patient has outpatient tele follow up scheduled for Thursday November 12th

## 2020-11-04 NOTE — DISCHARGE NOTE PROVIDER - NSDCMRMEDTOKEN_GEN_ALL_CORE_FT
metoprolol tartrate 25 mg oral tablet: 1 tab(s) orally once a day (at bedtime), last dose 10/24/2020   metoprolol tartrate 25 mg oral tablet: 1 tab(s) orally once a day (at bedtime), last dose 10/24/2020  rivaroxaban 15 mg oral tablet: 1 tab(s) orally 2 times a day (with meals)- price check pager [phone]   acetaminophen 325 mg oral tablet: 2 tab(s) orally every 6 hours, As needed, Moderate Pain (4 - 6)  aluminum hydroxide-magnesium hydroxide 200 mg-200 mg/5 mL oral suspension: 30 milliliter(s) orally every 6 hours, As needed, Dyspepsia  cefuroxime 250 mg oral tablet: 1 tab(s) orally every 12 hours til 11/9  Metoprolol Succinate ER 25 mg oral tablet, extended release: 0.5 tab(s) or 12.5 mg orally once a day - hold SBP &lt; 100, HR &lt; 60  pantoprazole 40 mg oral delayed release tablet: 1 tab(s) orally once a day (before a meal)  polyethylene glycol 3350 oral powder for reconstitution: 17 gram(s) orally 2 times a day, As needed, Constipation  rivaroxaban 15 mg oral tablet: 1 tab(s) orally 2 times a day (with meals)- price check pager [phone]  senna oral tablet: 2 tab(s) orally once a day (at bedtime)

## 2020-11-04 NOTE — DISCHARGE NOTE PROVIDER - NSDCCPCAREPLAN_GEN_ALL_CORE_FT
PRINCIPAL DISCHARGE DIAGNOSIS  Diagnosis: Pulmonary embolism  Assessment and Plan of Treatment: You came in to the hospital with shortness of breath and was found to have Pulmonary Embolism on CT scan. You were found to have a clot in your lungs and you were treated with heparin for anticoagulation while in patient and transitioned to Xarelto. Please continue taking your_Xarelto _ as directed and follow up with your primary care provider within 1 week of discharge for further care and recommendations. Report to the ED should you feel any shortness of breath, difficulty breathing with exersion, HA, dizziness, and continued or worsening chest pain.chest pain.  Please call and scheule follow up for repeat CT scan of the lungs in 6 months  Please follow up with your primary care physician , and cardiologist Dr Mckay in 1 week for further medical management.        SECONDARY DISCHARGE DIAGNOSES  Diagnosis: Palpitations  Assessment and Plan of Treatment: Follow up with Electrophysiologist Dr Quijano for Atrial tachycardia Ablation to be scheduled at a later date.     PRINCIPAL DISCHARGE DIAGNOSIS  Diagnosis: Pulmonary embolism  Assessment and Plan of Treatment: You came in to the hospital with shortness of breath and was found to have Pulmonary Embolism on CAT scan. You were found to have a clot in your lungs and you were treated with heparin for anticoagulation while in patient and transitioned to Xarelto. Please continue taking your Xarelto as directed and follow up with Dr. Mckay (Cardiology) within 1 week of discharge for further care and recommendations. Report to the ED should you feel any shortness of breath, difficulty breathing with exersion, HA, dizziness, and continued or worsening chest pain.chest pain.  Please call and schedule follow up for repeat CT scan of the lungs in 6 months. The Pulmonary clinic was emailed to set up an appointment for you and will contact you to scheudle this appointment- however if you do not hear from them within 5 days please call [phone] to schedule an appointment***  Please follow up with your primary care physician as well as cardiologist Dr Mckay in 1 week for further medical management.  Please also follow up your lab results as an outpatient with Hematology for hypercoagulable state. They will refer you upon discharge, however if you does not hear from the office within 5-7 days Please call [phone] to schedule an appointment****        SECONDARY DISCHARGE DIAGNOSES  Diagnosis: UTI (urinary tract infection)  Assessment and Plan of Treatment: Complete antibiotic therapy as directed.  Monitor for any further signs and symptoms of further infection including but not limited to fevers, rigors, chills and or difficulty breathing.      Diagnosis: Leg swelling  Assessment and Plan of Treatment: Due to blood clots in your legs continue your Xarelto as prescribed.    Diagnosis: Transaminitis  Assessment and Plan of Treatment: Your liver function tests were elevated likely due to the embolus in your lung versus medication that you have taken.   Please have your liver function tests repeated with your PCP within 1 week of discharge.    Diagnosis: Palpitations  Assessment and Plan of Treatment: Follow up with Electrophysiologist Dr Quijano for Atrial tachycardia Ablation to be scheduled at a later date.  You have an appointment on 11/30/30 at 12: 30PM w/ Dr. Quijano.

## 2020-11-04 NOTE — PROGRESS NOTE ADULT - SUBJECTIVE AND OBJECTIVE BOX
Interval History:  Patient resting comfortably in bed   Denies CP/SOB/palpitations/dizziness.  No acute events overnight.      Medications:  acetaminophen   Tablet .. 650 milliGRAM(s) Oral every 6 hours PRN  cefTRIAXone   IVPB      cefTRIAXone   IVPB 1000 milliGRAM(s) IV Intermittent every 24 hours  influenza   Vaccine 0.5 milliLiter(s) IntraMuscular once  metoprolol succinate ER 25 milliGRAM(s) Oral daily  rivaroxaban 15 milliGRAM(s) Oral two times a day with meals      Vitals:  T(C): 36.4 (11-04-20 @ 11:45), Max: 36.7 (11-03-20 @ 21:29)  HR: 58 (11-04-20 @ 11:45) (58 - 82)  BP: 117/70 (11-04-20 @ 11:45) (117/70 - 151/82)  BP(mean): --  RR: 18 (11-04-20 @ 11:45) (18 - 18)  SpO2: 98% (11-04-20 @ 11:45) (95% - 98%)    Daily     Daily         I&O's Summary    03 Nov 2020 07:01  -  04 Nov 2020 07:00  --------------------------------------------------------  IN: 783 mL / OUT: 300 mL / NET: 483 mL        Physical Exam:  Appearance: No Acute Distress  HEENT: PERRL  Cardiovascular: Normal S1 S2, No murmurs/rubs/gallops  Respiratory: Clear to auscultation bilaterally  Gastrointestinal: Soft, Non-tender	  Skin: No cyanosis	  Neurologic: Non-focal  Extremities: No LE edema  Psychiatry: A & O x 3, Mood & affect appropriate    Labs:                        11.8   7.26  )-----------( 241      ( 04 Nov 2020 05:55 )             38.2     11-04    135  |  104  |  26<H>  ----------------------------<  115<H>  4.4   |  20<L>  |  1.08    Ca    9.3      04 Nov 2020 05:55  Phos  4.6     11-04  Mg     2.3     11-04    TPro  7.5  /  Alb  3.8  /  TBili  0.5  /  DBili  x   /  AST  68<H>  /  ALT  88<H>  /  AlkPhos  74  11-04    PT/INR - ( 03 Nov 2020 05:11 )   PT: 12.1 SEC;   INR: 1.05          PTT - ( 04 Nov 2020 05:58 )  PTT:35.6 SEC      TELEMETRY: Sinus Rhythm (HR 60-80)

## 2020-11-05 ENCOUNTER — TRANSCRIPTION ENCOUNTER (OUTPATIENT)
Age: 65
End: 2020-11-05

## 2020-11-05 DIAGNOSIS — N39.0 URINARY TRACT INFECTION, SITE NOT SPECIFIED: ICD-10-CM

## 2020-11-05 DIAGNOSIS — Z71.89 OTHER SPECIFIED COUNSELING: ICD-10-CM

## 2020-11-05 LAB
ALBUMIN SERPL ELPH-MCNC: 3.6 G/DL — SIGNIFICANT CHANGE UP (ref 3.3–5)
ALP SERPL-CCNC: 71 U/L — SIGNIFICANT CHANGE UP (ref 40–120)
ALT FLD-CCNC: 100 U/L — HIGH (ref 4–33)
ANION GAP SERPL CALC-SCNC: 13 MMO/L — SIGNIFICANT CHANGE UP (ref 7–14)
AST SERPL-CCNC: 69 U/L — HIGH (ref 4–32)
B2 GLYCOPROT1 AB SER QL: POSITIVE — SIGNIFICANT CHANGE UP
BILIRUB SERPL-MCNC: 0.4 MG/DL — SIGNIFICANT CHANGE UP (ref 0.2–1.2)
BUN SERPL-MCNC: 24 MG/DL — HIGH (ref 7–23)
CALCIUM SERPL-MCNC: 8.9 MG/DL — SIGNIFICANT CHANGE UP (ref 8.4–10.5)
CHLORIDE SERPL-SCNC: 104 MMOL/L — SIGNIFICANT CHANGE UP (ref 98–107)
CO2 SERPL-SCNC: 20 MMOL/L — LOW (ref 22–31)
CREAT SERPL-MCNC: 0.98 MG/DL — SIGNIFICANT CHANGE UP (ref 0.5–1.3)
GLUCOSE SERPL-MCNC: 114 MG/DL — HIGH (ref 70–99)
HCT VFR BLD CALC: 36.4 % — SIGNIFICANT CHANGE UP (ref 34.5–45)
HGB BLD-MCNC: 11.5 G/DL — SIGNIFICANT CHANGE UP (ref 11.5–15.5)
MAGNESIUM SERPL-MCNC: 2.1 MG/DL — SIGNIFICANT CHANGE UP (ref 1.6–2.6)
MCHC RBC-ENTMCNC: 27.4 PG — SIGNIFICANT CHANGE UP (ref 27–34)
MCHC RBC-ENTMCNC: 31.6 % — LOW (ref 32–36)
MCV RBC AUTO: 86.7 FL — SIGNIFICANT CHANGE UP (ref 80–100)
NRBC # FLD: 0 K/UL — SIGNIFICANT CHANGE UP (ref 0–0)
PHOSPHATE SERPL-MCNC: 4.9 MG/DL — HIGH (ref 2.5–4.5)
PLATELET # BLD AUTO: 231 K/UL — SIGNIFICANT CHANGE UP (ref 150–400)
PMV BLD: 10.6 FL — SIGNIFICANT CHANGE UP (ref 7–13)
POTASSIUM SERPL-MCNC: 4.6 MMOL/L — SIGNIFICANT CHANGE UP (ref 3.5–5.3)
POTASSIUM SERPL-SCNC: 4.6 MMOL/L — SIGNIFICANT CHANGE UP (ref 3.5–5.3)
PROT SERPL-MCNC: 7.1 G/DL — SIGNIFICANT CHANGE UP (ref 6–8.3)
RBC # BLD: 4.2 M/UL — SIGNIFICANT CHANGE UP (ref 3.8–5.2)
RBC # FLD: 15 % — HIGH (ref 10.3–14.5)
SODIUM SERPL-SCNC: 137 MMOL/L — SIGNIFICANT CHANGE UP (ref 135–145)
WBC # BLD: 7.9 K/UL — SIGNIFICANT CHANGE UP (ref 3.8–10.5)
WBC # FLD AUTO: 7.9 K/UL — SIGNIFICANT CHANGE UP (ref 3.8–10.5)

## 2020-11-05 PROCEDURE — 99232 SBSQ HOSP IP/OBS MODERATE 35: CPT

## 2020-11-05 RX ORDER — RIVAROXABAN 15 MG-20MG
1 KIT ORAL
Qty: 60 | Refills: 0
Start: 2020-11-05 | End: 2020-12-04

## 2020-11-05 RX ORDER — POLYETHYLENE GLYCOL 3350 17 G/17G
17 POWDER, FOR SOLUTION ORAL
Refills: 0 | Status: DISCONTINUED | OUTPATIENT
Start: 2020-11-05 | End: 2020-11-06

## 2020-11-05 RX ORDER — PANTOPRAZOLE SODIUM 20 MG/1
40 TABLET, DELAYED RELEASE ORAL
Refills: 0 | Status: DISCONTINUED | OUTPATIENT
Start: 2020-11-05 | End: 2020-11-06

## 2020-11-05 RX ORDER — CEFUROXIME AXETIL 250 MG
250 TABLET ORAL EVERY 12 HOURS
Refills: 0 | Status: DISCONTINUED | OUTPATIENT
Start: 2020-11-05 | End: 2020-11-06

## 2020-11-05 RX ORDER — SENNA PLUS 8.6 MG/1
2 TABLET ORAL AT BEDTIME
Refills: 0 | Status: DISCONTINUED | OUTPATIENT
Start: 2020-11-05 | End: 2020-11-06

## 2020-11-05 RX ADMIN — Medication 25 MILLIGRAM(S): at 05:28

## 2020-11-05 RX ADMIN — Medication 650 MILLIGRAM(S): at 04:20

## 2020-11-05 RX ADMIN — Medication 250 MILLIGRAM(S): at 19:35

## 2020-11-05 RX ADMIN — SODIUM CHLORIDE 75 MILLILITER(S): 9 INJECTION INTRAMUSCULAR; INTRAVENOUS; SUBCUTANEOUS at 05:28

## 2020-11-05 RX ADMIN — RIVAROXABAN 15 MILLIGRAM(S): KIT at 18:20

## 2020-11-05 RX ADMIN — RIVAROXABAN 15 MILLIGRAM(S): KIT at 09:09

## 2020-11-05 RX ADMIN — Medication 650 MILLIGRAM(S): at 05:20

## 2020-11-05 NOTE — CHART NOTE - NSCHARTNOTEFT_GEN_A_CORE
[facility] consulted given uptrending LFTs. ID (Dr. Whitney) also consulted given UTI- will f/u recs.     Rupesh Alexandra NP [facility]   Pager [phone]

## 2020-11-05 NOTE — CONSULT NOTE ADULT - SUBJECTIVE AND OBJECTIVE BOX
Chief Complaint:  Patient is a 65y old  Female who presents with a chief complaint of SOB and palpitations (05 Nov 2020 14:05)    Atrial tachycardia    Ventricular septal defect    History of orthostatic hypotension    HTN (hypertension)    Single kidney    S/p nephrectomy       HPI:  65F with PMHx AT s/p failed ablation (not performed in 2015), positive tilt table/orthostatic hypotension, VSD, non-obstructive CAD, PVCs, chronic palpitations, renal donor (s/p L nephrectomy for son) presenting from [facility] for palpitations and SOB at rest. Pt initially started having palpitations 5 years ago but was unable to have ablation then. Since patient has on and off palpitations but has been able to perform ADLs. On 10/9 seen by Dr. Quijano and noted to have runs of Atach on holter monitor and plan was for EP study and ablation. Today patient presented to [facility] and noted to have palpitations, SOB at rest over last 6 days and TWI on EKG done. Sent to ED. Per patient [place] and had mild SOB prior to this trip 6 days ago which worsened during her stay. ET only about 10 steps but formerly multiple blocks. Denies orthopnea to me (endorsed to other provider). She states SOB is specifically when she gets palpitations which have started to have more in frequency and severity. At this point it is limiting her ADLs. Of note she also had CP left sided, nonradiating 3 days ago occurring overnight. This self resolved on its own and she hasn't had since. Denies hx prior CHF. Endorses dysuria 5 days ago now resolved, but denies fevers, chills, polyuria, foul smelling urine. Also endorses leg swelling b/l but now improved today. She only noticed this yesterday. (29 Oct 2020 21:07) GI consulted for increased liver enzymes. The patient is without abdominal complaints of pain or n/v. She does not use recreational drugs, rarely does she drink etoh, no herbal supplements. No recent travel.       No Known Allergies      acetaminophen   Tablet .. 650 milliGRAM(s) Oral every 6 hours PRN  cefTRIAXone   IVPB      cefTRIAXone   IVPB 1000 milliGRAM(s) IV Intermittent every 24 hours  influenza   Vaccine 0.5 milliLiter(s) IntraMuscular once  metoprolol succinate ER 25 milliGRAM(s) Oral daily  rivaroxaban 15 milliGRAM(s) Oral two times a day with meals  sodium chloride 0.9%. 1000 milliLiter(s) IV Continuous <Continuous>        FAMILY HISTORY:  Family history of prostate cancer (Father)          Review of Systems:    General:  No wt loss, fevers, chills, night sweats, fatigue  Eyes:  Good vision, no reported pain  ENT:  No sore throat, pain, runny nose, dysphagia  CV:  No pain, palpitations, no lightheadedness  Resp:  No dyspnea, cough, tachypnea, wheezing  GI: denies n/v/d/c, abdominal pain, melena or brbpr   :  No pain, bleeding, incontinence, nocturia  Muscle:  No pain, weakness  Neuro:  No weakness, tingling, memory problems  Psych:  No fatigue, insomnia, mood problems, depression  Endocrine:  No polyuria, polydypsia, cold/heat intolerance  Heme:  No petechiae, ecchymosis, easy bruisability  Skin:  No rash, tattoos, scars, edema    Relevant Family History:   n/c    Relevant Social History: n/c      Physical Exam:    Vital Signs:  Vital Signs Last 24 Hrs  T(C): 36.7 (05 Nov 2020 11:46), Max: 36.7 (05 Nov 2020 11:46)  T(F): 98 (05 Nov 2020 11:46), Max: 98 (05 Nov 2020 11:46)  HR: 60 (05 Nov 2020 11:46) (55 - 62)  BP: 149/85 (05 Nov 2020 11:46) (113/62 - 149/85)  BP(mean): --  RR: 17 (05 Nov 2020 11:46) (17 - 17)  SpO2: 96% (05 Nov 2020 11:46) (96% - 99%)  Daily     Daily     General:  Appears stated age, well-groomed, nad  HEENT:  NC/AT,  conjunctivae clear and pink, no thyromegaly, nodules, adenopathy, no JVD  Chest:  Full & symmetric excursion, no increased effort, breath sounds clear  Cardiovascular:  Regular rhythm, S1, S2, no murmur/rub/S3/S4, no abdominal bruit, no edema  Abdomen:  Soft, non-tender, non-distended, normoactive bowel sounds,  no masses ,no hepatosplenomeagaly, no signs of chronic liver disease  Extremities:  no cyanosis,clubbing or edema  Skin:  No rash/erythema/ecchymoses/petechiae/wounds/abscess/warm/dry  Neuro/Psych:  A&Ox3  , no asterixis, no tremor, no encephalopathy    Laboratory:                            11.5   7.90  )-----------( 231      ( 05 Nov 2020 06:20 )             36.4     11-05    137  |  104  |  24<H>  ----------------------------<  114<H>  4.6   |  20<L>  |  0.98    Ca    8.9      05 Nov 2020 06:20  Phos  4.9     11-05  Mg     2.1     11-05    TPro  7.1  /  Alb  3.6  /  TBili  0.4  /  DBili  x   /  AST  69<H>  /  ALT  100<H>  /  AlkPhos  71  11-05    LIVER FUNCTIONS - ( 05 Nov 2020 06:20 )  Alb: 3.6 g/dL / Pro: 7.1 g/dL / ALK PHOS: 71 u/L / ALT: 100 u/L / AST: 69 u/L / GGT: x           PTT - ( 04 Nov 2020 05:58 )  PTT:35.6 SEC      Imaging:      < from: US Abdomen Limited (10.30.20 @ 14:44) >    EXAM:  US ABDOMEN LIMITED        PROCEDURE DATE:  Oct 30 2020         INTERPRETATION:  CLINICAL INFORMATION: Liver enzymes are increased, shortness of breath    COMPARISON: None available.    TECHNIQUE: Sonography of the right upper quadrant.    FINDINGS:    Liver: 1.7 cm. Simple cyst within the left lobe of the liver.  Bile ducts: Normal caliber. Common bile duct measures 4 mm.  Gallbladder: Within normal limits.  Pancreas: Visualized portions are within normal limits.  Right kidney: Status post right nephrectomy  Left kidney: 15.1 cm. No hydronephrosis.  Ascites: None.  IVC: Visualized portions are within normal limits.    IMPRESSION:    No sonographic evidence of acute cholecystitis. No cholelithiasis. Normal caliber bile ducts.            NIKHIL SIU MD; Resident Radiology  This document has been electronically signed.  RAÚL LOVE MD; Attending Radiologist  This document has been electronically signed. Oct 30 2020  3:09PM    < end of copied text >      < from: CT Abdomen and Pelvis w/ IV Cont (11.04.20 @ 19:25) >    EXAM:  CT ABDOMEN AND PELVIS IC        PROCEDURE DATE:  Nov 4 2020         INTERPRETATION:  CLINICAL INFORMATION: Recent diagnosis of pulmonary emboli. Evaluate for malignancy.    COMPARISON: None.    PROCEDURE:  CT of the Abdomen and Pelvis was performed with intravenous contrast.  Intravenous contrast: 90 ml Omnipaque 350. 10 ml discarded.  Oral contrast: None.  Sagittal and coronal reformats were performed.    FINDINGS:  LOWER CHEST: Within normal limits.    LIVER: Few scattered hypoattenuating foci, likely cysts.  BILE DUCTS: Normal caliber.  GALLBLADDER: Tiny stones/sludge.  SPLEEN: Within normal limits.  PANCREAS: Within normal limits. Pancreas divisum.  ADRENALS: Within normal limits.  KIDNEYS/URETERS: Status post right nephrectomy.Left parapelvic cyst. Mild prominence of the left intrarenal collecting system, likely due to the course of the left ureter. Cortical scarring.    BLADDER: Within normal limits.  REPRODUCTIVE ORGANS: Dilated pelvic veins. Uterus and adnexa are withinnormal limits.    BOWEL: No bowel obstruction. Appendix is normal.  PERITONEUM: No ascites.  VESSELS: Trace atherosclerotic changes.  RETROPERITONEUM/LYMPH NODES: No lymphadenopathy.  ABDOMINAL WALL: Within normal limits.  BONES: Degenerative changes.    IMPRESSION:  No evidence of a neoplastic process in the abdomen and pelvis.              WAQAS PARSON MD; Resident Radiology  This document has been electronically signed.  SANDRA RAMÍREZ MD; Attending Radiologist  This document has been electronically signed. Nov 5 2020  9:05AM    < end of copied text >    < from: CT Angio Chest w/ IV Cont (10.29.20 @ 23:38) >    EXAM:  CT ANGIO CHEST (W)AW IC        PROCEDURE DATE:  Oct 29 2020         INTERPRETATION:  EXAMINATION: CT ANGIO CHEST WITHOUT AND OR WITH IV CONTRAST    CLINICAL INDICATION: palpitations, SOB, hypoxia  r/o PE    rule out PE    TECHNIQUE: CTA of the chest was performed for evaluation of pulmonary embolism after administration of 90 ml of Omnipaque-350, 10 ml discarded.  MIP images were reconstructed.    COMPARISON: None.    FINDINGS:    PULMONARY ARTERIES: Pulmonary emboli in all 5 lobes extending proximally on the right from the peripheral main pulmonary artery and proximally on the left from both central lumbar arteries.    AIRWAYS AND LUNGS: The central tracheobronchial tree is patent.  Right upper lobe 6 mm nodule.    MEDIASTINUM AND PLEURA: There are no enlarged mediastinal, hilar or axillary lymph nodes. The visualized portion of the thyroid gland is unremarkable. There is no pleural effusion. There is no pneumothorax.    HEART AND VESSELS: The heart is normal in size. Prominent right heart with enlarged pulmonary artery.  There are no atherosclerotic calcifications of the aorta.  There is no pericardial effusion.    UPPER ABDOMEN: Images of the upper abdomen demonstrate right periaortic postsurgical changes. Left hepatic cyst.    BONES AND SOFT TISSUES: The bones are unremarkable.  The soft tissues are unremarkable.    TUBES/LINES: None.    IMPRESSION:  Extensive pulmonary embolism. Suggestion right heart strain. Echo recommended for complete evaluation.    Right upper lobe 6 mm nodule is indeterminate. 6 month follow-up CT recommended for complete evaluation.              CUONG BOLAND MD; Attending Radiologist  This document has been electronically signed. Oct 30 2020  8:50AM    < end of copied text >     Chief Complaint:  Patient is a 65y old  Female who presents with a chief complaint of SOB and palpitations (05 Nov 2020 14:05)    Atrial tachycardia    Ventricular septal defect    History of orthostatic hypotension    HTN (hypertension)    Single kidney    S/p nephrectomy       HPI:  65F with PMHx AT s/p failed ablation (not performed in 2015), positive tilt table/orthostatic hypotension, VSD, non-obstructive CAD, PVCs, chronic palpitations, renal donor (s/p L nephrectomy for son) presenting from [place] for pal[place] and SOB at rest. Pt initially started having palpitations 5 years ago but was unable to have ablation then. Since patient has on and off palpitations but has been able to perform ADLs. On 10/9 seen by Dr. Quijano and noted to have runs of Atach on holter monitor and plan was for EP study and ablation. Today patient presented to [facility] and noted to have palpitations, SOB at rest over last 6 days and TWI on EKG done. Sent to ED. Per patient she recently [place] and had mild SOB prior to this trip 6 days ago which worsened during her stay. ET only about 10 steps but formerly multiple blocks. Denies orthopnea to me (endorsed to other provider). She states SOB is specifically when she gets palpitations which have started to have more in frequency and severity. At this point it is limiting her ADLs. Of note she also had CP left sided, nonradiating 3 days ago occurring overnight. This self resolved on its own and she hasn't had since. Denies hx prior CHF. Endorses dysuria 5 days ago now resolved, but denies fevers, chills, polyuria, foul smelling urine. Also endorses leg swelling b/l but now improved today. She only noticed this yesterday. (29 Oct 2020 21:07) GI consulted for increased liver enzymes. The patient is without abdominal complaints of pain or n/v. She does not use recreational drugs, rarely does she drink etoh, no herbal supplements. (+) recent air travel about a 5 hour flight. No h/o familial cancer to her knowledge      No Known Allergies      acetaminophen   Tablet .. 650 milliGRAM(s) Oral every 6 hours PRN  cefTRIAXone   IVPB      cefTRIAXone   IVPB 1000 milliGRAM(s) IV Intermittent every 24 hours  influenza   Vaccine 0.5 milliLiter(s) IntraMuscular once  metoprolol succinate ER 25 milliGRAM(s) Oral daily  rivaroxaban 15 milliGRAM(s) Oral two times a day with meals  sodium chloride 0.9%. 1000 milliLiter(s) IV Continuous <Continuous>        FAMILY HISTORY:  Family history of prostate cancer (Father)          Review of Systems:    General:  No wt loss, fevers, chills, night sweats, fatigue  Eyes:  Good vision, no reported pain  ENT:  No sore throat, pain, runny nose, dysphagia  CV:  No pain, palpitations, no lightheadedness  Resp:  No dyspnea, cough, tachypnea, wheezing  GI: denies n/v/d/c, abdominal pain, melena or brbpr   :  No pain, bleeding, incontinence, nocturia  Muscle:  No pain, weakness  Neuro:  No weakness, tingling, memory problems  Psych:  No fatigue, insomnia, mood problems, depression  Endocrine:  No polyuria, polydypsia, cold/heat intolerance  Heme:  No petechiae, ecchymosis, easy bruisability  Skin:  No rash, tattoos, scars, edema    Relevant Family History:   n/c    Relevant Social History: n/c      Physical Exam:    Vital Signs:  Vital Signs Last 24 Hrs  T(C): 36.7 (05 Nov 2020 11:46), Max: 36.7 (05 Nov 2020 11:46)  T(F): 98 (05 Nov 2020 11:46), Max: 98 (05 Nov 2020 11:46)  HR: 60 (05 Nov 2020 11:46) (55 - 62)  BP: 149/85 (05 Nov 2020 11:46) (113/62 - 149/85)  BP(mean): --  RR: 17 (05 Nov 2020 11:46) (17 - 17)  SpO2: 96% (05 Nov 2020 11:46) (96% - 99%)  Daily     Daily     General:  Appears stated age, well-groomed, nad  HEENT:  NC/AT,  conjunctivae clear and pink, no thyromegaly, nodules, adenopathy, no JVD  Chest:  Full & symmetric excursion, no increased effort, breath sounds clear  Cardiovascular:  Regular rhythm, S1, S2, no murmur/rub/S3/S4, no abdominal bruit, no edema  Abdomen:  Soft, non-tender, non-distended, normoactive bowel sounds,  no masses ,no hepatosplenomeagaly, no signs of chronic liver disease  Extremities:  no cyanosis,clubbing or edema  Skin:  No rash/erythema/ecchymoses/petechiae/wounds/abscess/warm/dry  Neuro/Psych:  A&Ox3  , no asterixis, no tremor, no encephalopathy    Laboratory:                            11.5   7.90  )-----------( 231      ( 05 Nov 2020 06:20 )             36.4     11-05    137  |  104  |  24<H>  ----------------------------<  114<H>  4.6   |  20<L>  |  0.98    Ca    8.9      05 Nov 2020 06:20  Phos  4.9     11-05  Mg     2.1     11-05    TPro  7.1  /  Alb  3.6  /  TBili  0.4  /  DBili  x   /  AST  69<H>  /  ALT  100<H>  /  AlkPhos  71  11-05    LIVER FUNCTIONS - ( 05 Nov 2020 06:20 )  Alb: 3.6 g/dL / Pro: 7.1 g/dL / ALK PHOS: 71 u/L / ALT: 100 u/L / AST: 69 u/L / GGT: x           PTT - ( 04 Nov 2020 05:58 )  PTT:35.6 SEC      Imaging:      < from: US Abdomen Limited (10.30.20 @ 14:44) >    EXAM:  US ABDOMEN LIMITED        PROCEDURE DATE:  Oct 30 2020         INTERPRETATION:  CLINICAL INFORMATION: Liver enzymes are increased, shortness of breath    COMPARISON: None available.    TECHNIQUE: Sonography of the right upper quadrant.    FINDINGS:    Liver: 1.7 cm. Simple cyst within the left lobe of the liver.  Bile ducts: Normal caliber. Common bile duct measures 4 mm.  Gallbladder: Within normal limits.  Pancreas: Visualized portions are within normal limits.  Right kidney: Status post right nephrectomy  Left kidney: 15.1 cm. No hydronephrosis.  Ascites: None.  IVC: Visualized portions are within normal limits.    IMPRESSION:    No sonographic evidence of acute cholecystitis. No cholelithiasis. Normal caliber bile ducts.            NIKHIL SIU MD; Resident Radiology  This document has been electronically signed.  RAÚL LOVE MD; Attending Radiologist  This document has been electronically signed. Oct 30 2020  3:09PM    < end of copied text >      < from: CT Abdomen and Pelvis w/ IV Cont (11.04.20 @ 19:25) >    EXAM:  CT ABDOMEN AND PELVIS IC        PROCEDURE DATE:  Nov 4 2020         INTERPRETATION:  CLINICAL INFORMATION: Recent diagnosis of pulmonary emboli. Evaluate for malignancy.    COMPARISON: None.    PROCEDURE:  CT of the Abdomen and Pelvis was performed with intravenous contrast.  Intravenous contrast: 90 ml Omnipaque 350. 10 ml discarded.  Oral contrast: None.  Sagittal and coronal reformats were performed.    FINDINGS:  LOWER CHEST: Within normal limits.    LIVER: Few scattered hypoattenuating foci, likely cysts.  BILE DUCTS: Normal caliber.  GALLBLADDER: Tiny stones/sludge.  SPLEEN: Within normal limits.  PANCREAS: Within normal limits. Pancreas divisum.  ADRENALS: Within normal limits.  KIDNEYS/URETERS: Status post right nephrectomy.Left parapelvic cyst. Mild prominence of the left intrarenal collecting system, likely due to the course of the left ureter. Cortical scarring.    BLADDER: Within normal limits.  REPRODUCTIVE ORGANS: Dilated pelvic veins. Uterus and adnexa are withinnormal limits.    BOWEL: No bowel obstruction. Appendix is normal.  PERITONEUM: No ascites.  VESSELS: Trace atherosclerotic changes.  RETROPERITONEUM/LYMPH NODES: No lymphadenopathy.  ABDOMINAL WALL: Within normal limits.  BONES: Degenerative changes.    IMPRESSION:  No evidence of a neoplastic process in the abdomen and pelvis.              WAQAS PAROSN MD; Resident Radiology  This document has been electronically signed.  SANDRA RAMÍREZ MD; Attending Radiologist  This document has been electronically signed. Nov 5 2020  9:05AM    < end of copied text >    < from: CT Angio Chest w/ IV Cont (10.29.20 @ 23:38) >    EXAM:  CT ANGIO CHEST (W)AW IC        PROCEDURE DATE:  Oct 29 2020         INTERPRETATION:  EXAMINATION: CT ANGIO CHEST WITHOUT AND OR WITH IV CONTRAST    CLINICAL INDICATION: palpitations, SOB, hypoxia  r/o PE    rule out PE    TECHNIQUE: CTA of the chest was performed for evaluation of pulmonary embolism after administration of 90 ml of Omnipaque-350, 10 ml discarded.  MIP images were reconstructed.    COMPARISON: None.    FINDINGS:    PULMONARY ARTERIES: Pulmonary emboli in all 5 lobes extending proximally on the right from the peripheral main pulmonary artery and proximally on the left from both central lumbar arteries.    AIRWAYS AND LUNGS: The central tracheobronchial tree is patent.  Right upper lobe 6 mm nodule.    MEDIASTINUM AND PLEURA: There are no enlarged mediastinal, hilar or axillary lymph nodes. The visualized portion of the thyroid gland is unremarkable. There is no pleural effusion. There is no pneumothorax.    HEART AND VESSELS: The heart is normal in size. Prominent right heart with enlarged pulmonary artery.  There are no atherosclerotic calcifications of the aorta.  There is no pericardial effusion.    UPPER ABDOMEN: Images of the upper abdomen demonstrate right periaortic postsurgical changes. Left hepatic cyst.    BONES AND SOFT TISSUES: The bones are unremarkable.  The soft tissues are unremarkable.    TUBES/LINES: None.    IMPRESSION:  Extensive pulmonary embolism. Suggestion right heart strain. Echo recommended for complete evaluation.    Right upper lobe 6 mm nodule is indeterminate. 6 month follow-up CT recommended for complete evaluation.              CUONG BOLAND MD; Attending Radiologist  This document has been electronically signed. Oct 30 2020  8:50AM    < end of copied text >

## 2020-11-05 NOTE — DIETITIAN INITIAL EVALUATION ADULT. - ADD RECOMMEND
1. Encourage & assist Pt with meals; Monitor PO diet tolerance; Honor food preferences;      2. Monitor labs, hydration status;

## 2020-11-05 NOTE — CONSULT NOTE ADULT - REASON FOR ADMISSION
SOB and palpitations
progressive worsening SOB and palpitation
SOB and palpitations

## 2020-11-05 NOTE — PROGRESS NOTE ADULT - ASSESSMENT
65 year old Female with PMH of  Atrial tachycardia s/p failed ablation, positive tilt table/orthostatic hypotension, non-obstructive CAD, PVCs, chronic palpitations, renal donor (s/p L nephrectomy for son). Patient presented to ED from [facility] on 10/29 due to c/o palpitations and SOB at rest. Found to have extensive PE in all five lobes on CTA chest with evidence of RV strain. Started on IV Heparin.  Denies fever/chills/n/v/d, recent travel to the [place]. COVID PCR and Antibody negative.   TTE with Dilated RV and Decreased RV function (of note RV normal on MRI on 9/23/2020). LE dopplers with acute Rt peroneal and PT vein DVT. 11/4 Abdominal CT completed shows no evidence of malignancy. AST/ALT trending slightly upward, will review with Dr. Mckay       Continue telemetry monitoring.  Monitor lytes and replete K>4.0 and Mg>2.0.   Xarelto 15mg twice daily  Metoprolol 25mg daily    Appreciate Cardiology recommendations    Follow up appointment (future AT ablation) with Dr. Quijano  11/30/20 at 12:30pm

## 2020-11-05 NOTE — CONSULT NOTE ADULT - PROBLEM SELECTOR RECOMMENDATION 2
-CT Chest w/extensive PE with Right heart strain  -TTE noted  -no gi objection to continuing a/c with Protonix QD for gi ppx   -cont management per pulm/cardiology and medicine teams; care appreciated

## 2020-11-05 NOTE — CONSULT NOTE ADULT - SUBJECTIVE AND OBJECTIVE BOX
Patient is a 65y old  Female who presents with a chief complaint of SOB and palpitations (05 Nov 2020 11:52)      HPI:  65F with PMHx AT s/p failed ablation (not performed in 2015), positive tilt table/orthostatic hypotension, VSD, non-obstructive CAD, PVCs, chronic palpitations, renal donor (s/p L nephrectomy for son) presenting from [facility] for palpitations and SOB at rest. Pt initially started having palpitations 5 years ago but was unable to have ablation then. Since patient has on and off palpitations but has been able to perform ADLs. On 10/9 seen by Dr. Quijano and noted to have runs of Atach on holter monitor and plan was for EP study and ablation. Today patient presented to [email or web address] and noted to have palpitations, SOB at rest over last 6 days and TWI on EKG done. Sent to ED. Per patient she recently visited the DR and had mild SOB prior to this trip 6 days ago which worsened during her stay. ET only about 10 steps but formerly multiple blocks. Denies orthopnea to me (endorsed to other provider). She states SOB is specifically when she gets palpitations which have started to have more in frequency and severity. At this point it is limiting her ADLs. Of note she also had CP left sided, nonradiating 3 days ago occurring overnight. This self resolved on its own and she hasn't had since. Denies hx prior CHF. Endorses dysuria 5 days ago now resolved, but denies fevers, chills, polyuria, foul smelling urine. Also endorses leg swelling b/l but now improved today. She only noticed this yesterday. (29 Oct 2020 21:07)      REVIEW OF SYSTEMS:    CONSTITUTIONAL: No fever, weight loss, or fatigue  EYES: No eye pain, visual disturbances, or discharge  ENMT:  No sore throat  NECK: No pain or stiffness  RESPIRATORY: No cough, wheezing, chills or hemoptysis; No shortness of breath  CARDIOVASCULAR: No chest pain, palpitations, dizziness, or leg swelling  GASTROINTESTINAL: No abdominal or epigastric pain. No nausea, vomiting, or hematemesis; No diarrhea or constipation. No melena or hematochezia.  GENITOURINARY: No dysuria, frequency, hematuria, or incontinence  NEUROLOGICAL: No headaches, memory loss, loss of strength, numbness, or tremors  SKIN: No itching, burning, rashes, or lesions   LYMPH NODES: No enlarged glands  MUSCULOSKELETAL: No joint pain or swelling; No muscle, back, or extremity pain      PAST MEDICAL & SURGICAL HISTORY:  Atrial tachycardia    Ventricular septal defect    History of orthostatic hypotension    Single kidney    S/p nephrectomy  donated kidney- left 1984        Allergies    No Known Allergies    Intolerances        FAMILY HISTORY:  Family history of prostate cancer (Father)        SOCIAL HISTORY:        MEDICATIONS  (STANDING):  cefTRIAXone   IVPB      cefTRIAXone   IVPB 1000 milliGRAM(s) IV Intermittent every 24 hours  influenza   Vaccine 0.5 milliLiter(s) IntraMuscular once  metoprolol succinate ER 25 milliGRAM(s) Oral daily  rivaroxaban 15 milliGRAM(s) Oral two times a day with meals  sodium chloride 0.9%. 1000 milliLiter(s) (75 mL/Hr) IV Continuous <Continuous>    MEDICATIONS  (PRN):  acetaminophen   Tablet .. 650 milliGRAM(s) Oral every 6 hours PRN Moderate Pain (4 - 6)      Vital Signs Last 24 Hrs  T(C): 36.7 (05 Nov 2020 11:46), Max: 36.7 (05 Nov 2020 11:46)  T(F): 98 (05 Nov 2020 11:46), Max: 98 (05 Nov 2020 11:46)  HR: 60 (05 Nov 2020 11:46) (55 - 62)  BP: 149/85 (05 Nov 2020 11:46) (113/62 - 149/85)  BP(mean): --  RR: 17 (05 Nov 2020 11:46) (17 - 17)  SpO2: 96% (05 Nov 2020 11:46) (96% - 99%)    PHYSICAL EXAM:    GENERAL: NAD, well-groomed  HEAD:  Atraumatic, Normocephalic  EYES: EOMI, PERRLA, conjunctiva and sclera clear  ENMT: No tonsillar erythema, exudates, or enlargement; Moist mucous membranes  NECK: Supple, No JVD  CHEST/LUNG: Clear to percussion bilaterally; No rales, rhonchi, wheezing, or rubs  HEART: Regular rate and rhythm; No murmurs, rubs, or gallops  ABDOMEN: Soft, Nontender, Nondistended; Bowel sounds present  EXTREMITIES:  2+ Peripheral Pulses, No clubbing, cyanosis, or edema  LYMPH: No lymphadenopathy noted  SKIN: No rashes or lesions    LABS:  CBC Full  -  ( 05 Nov 2020 06:20 )  WBC Count : 7.90 K/uL  RBC Count : 4.20 M/uL  Hemoglobin : 11.5 g/dL  Hematocrit : 36.4 %  Platelet Count - Automated : 231 K/uL  Mean Cell Volume : 86.7 fL  Mean Cell Hemoglobin : 27.4 pg  Mean Cell Hemoglobin Concentration : 31.6 %  Auto Neutrophil # : x  Auto Lymphocyte # : x  Auto Monocyte # : x  Auto Eosinophil # : x  Auto Basophil # : x  Auto Neutrophil % : x  Auto Lymphocyte % : x  Auto Monocyte % : x  Auto Eosinophil % : x  Auto Basophil % : x      11-05    137  |  104  |  24<H>  ----------------------------<  114<H>  4.6   |  20<L>  |  0.98    Ca    8.9      05 Nov 2020 06:20  Phos  4.9     11-05  Mg     2.1     11-05    TPro  7.1  /  Alb  3.6  /  TBili  0.4  /  DBili  x   /  AST  69<H>  /  ALT  100<H>  /  AlkPhos  71  11-05      LIVER FUNCTIONS - ( 05 Nov 2020 06:20 )  Alb: 3.6 g/dL / Pro: 7.1 g/dL / ALK PHOS: 71 u/L / ALT: 100 u/L / AST: 69 u/L / GGT: x                               MICROBIOLOGY:                    RADIOLOGY:                 Patient is a 65y old  Female who presents with a chief complaint of SOB and palpitations (05 Nov 2020 11:52)      HPI:  65F with PMHx AT s/p failed ablation (not performed in 2015), positive tilt table/orthostatic hypotension, VSD, non-obstructive CAD, PVCs, chronic palpitations, renal donor (s/p L nephrectomy for son) presenting from [facility] for palpitations and SOB at rest. Pt initially started having palpitations 5 years ago but was unable to have ablation then. Since patient has on and off palpitations but has been able to perform ADLs. On 10/9 seen by Dr. Quijano and noted to have runs of Atach on holter monitor and plan was for EP study and ablation. Today patient presented to [facility] and noted to have palpitations, SOB at rest over last 6 days and TWI on EKG done. Sent to ED. Per patient she recently visited the DR and had mild SOB prior to this trip 6 days ago which worsened during her stay. ET only about 10 steps but formerly multiple blocks. Denies orthopnea to me (endorsed to other provider). She states SOB is specifically when she gets palpitations which have started to have more in frequency and severity. At this point it is limiting her ADLs. Of note she also had CP left sided, nonradiating 3 days ago occurring overnight. This self resolved on its own and she hasn't had since. Denies hx prior CHF. Endorses dysuria 5 days ago now resolved, but denies fevers, chills, polyuria, foul smelling urine. Also endorses leg swelling b/l but now improved today. She only noticed this yesterday. (29 Oct 2020 21:07)          REVIEW OF SYSTEMS:    CONSTITUTIONAL: No fever, weight loss, or fatigue  EYES: No eye pain, visual disturbances, or discharge  ENMT:  No sore throat  NECK: No pain or stiffness  RESPIRATORY: No cough, wheezing, chills or hemoptysis; No shortness of breath  CARDIOVASCULAR: No chest pain, palpitations, dizziness, or leg swelling  GASTROINTESTINAL: No abdominal or epigastric pain. No nausea, vomiting, or hematemesis; No diarrhea or constipation. No melena or hematochezia.  GENITOURINARY: No dysuria, frequency, hematuria, or incontinence  NEUROLOGICAL: No headaches, memory loss, loss of strength, numbness, or tremors  SKIN: No itching, burning, rashes, or lesions   LYMPH NODES: No enlarged glands  MUSCULOSKELETAL: No joint pain or swelling; No muscle, back, or extremity pain      PAST MEDICAL & SURGICAL HISTORY:  Atrial tachycardia    Ventricular septal defect    History of orthostatic hypotension    Single kidney    S/p nephrectomy  donated kidney- left 1984        Allergies    No Known Allergies    Intolerances        FAMILY HISTORY:  Family history of prostate cancer (Father)        SOCIAL HISTORY:        MEDICATIONS  (STANDING):  cefTRIAXone   IVPB      cefTRIAXone   IVPB 1000 milliGRAM(s) IV Intermittent every 24 hours  influenza   Vaccine 0.5 milliLiter(s) IntraMuscular once  metoprolol succinate ER 25 milliGRAM(s) Oral daily  rivaroxaban 15 milliGRAM(s) Oral two times a day with meals  sodium chloride 0.9%. 1000 milliLiter(s) (75 mL/Hr) IV Continuous <Continuous>    MEDICATIONS  (PRN):  acetaminophen   Tablet .. 650 milliGRAM(s) Oral every 6 hours PRN Moderate Pain (4 - 6)      Vital Signs Last 24 Hrs  T(C): 36.7 (05 Nov 2020 11:46), Max: 36.7 (05 Nov 2020 11:46)  T(F): 98 (05 Nov 2020 11:46), Max: 98 (05 Nov 2020 11:46)  HR: 60 (05 Nov 2020 11:46) (55 - 62)  BP: 149/85 (05 Nov 2020 11:46) (113/62 - 149/85)  BP(mean): --  RR: 17 (05 Nov 2020 11:46) (17 - 17)  SpO2: 96% (05 Nov 2020 11:46) (96% - 99%)    PHYSICAL EXAM:    GENERAL: NAD, well-groomed  HEAD:  Atraumatic, Normocephalic  EYES: EOMI, PERRLA, conjunctiva and sclera clear  ENMT: No tonsillar erythema, exudates, or enlargement; Moist mucous membranes  NECK: Supple, No JVD  CHEST/LUNG: Clear to percussion bilaterally; No rales, rhonchi, wheezing, or rubs  HEART: Regular rate and rhythm; No murmurs, rubs, or gallops  ABDOMEN: Soft, Nontender, Nondistended; Bowel sounds present  EXTREMITIES:  2+ Peripheral Pulses, No clubbing, cyanosis, or edema  LYMPH: No lymphadenopathy noted  SKIN: No rashes or lesions    LABS:  CBC Full  -  ( 05 Nov 2020 06:20 )  WBC Count : 7.90 K/uL  RBC Count : 4.20 M/uL  Hemoglobin : 11.5 g/dL  Hematocrit : 36.4 %  Platelet Count - Automated : 231 K/uL  Mean Cell Volume : 86.7 fL  Mean Cell Hemoglobin : 27.4 pg  Mean Cell Hemoglobin Concentration : 31.6 %  Auto Neutrophil # : x  Auto Lymphocyte # : x  Auto Monocyte # : x  Auto Eosinophil # : x  Auto Basophil # : x  Auto Neutrophil % : x  Auto Lymphocyte % : x  Auto Monocyte % : x  Auto Eosinophil % : x  Auto Basophil % : x      11-05    137  |  104  |  24<H>  ----------------------------<  114<H>  4.6   |  20<L>  |  0.98    Ca    8.9      05 Nov 2020 06:20  Phos  4.9     11-05  Mg     2.1     11-05    TPro  7.1  /  Alb  3.6  /  TBili  0.4  /  DBili  x   /  AST  69<H>  /  ALT  100<H>  /  AlkPhos  71  11-05      LIVER FUNCTIONS - ( 05 Nov 2020 06:20 )  Alb: 3.6 g/dL / Pro: 7.1 g/dL / ALK PHOS: 71 u/L / ALT: 100 u/L / AST: 69 u/L / GGT: x                               MICROBIOLOGY:                    RADIOLOGY:                 Patient is a 65y old  Female who presents with a chief complaint of SOB and palpitations (05 Nov 2020 11:52)      HPI:  65F with PMHx AT s/p failed ablation (not performed in 2015), positive tilt table/orthostatic hypotension, VSD, non-obstructive CAD, PVCs, chronic palpitations, renal donor (s/p L nephrectomy for son) presenting [place] for [place] and SOB at rest. Pt initially started having palpitations 5 years ago but was unable to have ablation then. Since patient has on and off palpitations but has been able to perform ADLs. On 10/9 seen by Dr. Quijano and noted to have runs of Atach on holter monitor and plan was for EP study and ablation. Today patient presented to [facility] and noted to have palpitations, SOB at rest over last 6 days and TWI on EKG done. Sent to ED. Per patient she recently visited the DR and had mild SOB prior to this trip 6 days ago which worsened during her stay. ET only about 10 steps but formerly multiple blocks. Denies orthopnea to me (endorsed to other provider). She states SOB is specifically when she gets palpitations which have started to have more in frequency and severity. At this point it is limiting her ADLs. Of note she also had CP left sided, nonradiating 3 days ago occurring overnight. This self resolved on its own and she hasn't had since. Denies hx prior CHF. Endorses dysuria 5 days ago now resolved, but denies fevers, chills, polyuria, foul smelling urine. Also endorses leg swelling b/l but now improved today. She only noticed this yesterday. (29 Oct 2020 21:07)    Pt p/w SOB, CTA chest with extensive PE in all 5 lobes and LE dopplers with acute Rt peroneal and PT vein DVT.  Pt was on hep gtt, now on xarelto.  CTap no mass.  Heme w/u for hypercoagulability in progress.  Pt on rocephin for klebsiella UTI.  ID consulted for further abx managment.         REVIEW OF SYSTEMS:    CONSTITUTIONAL: No fever, weight loss, or fatigue  EYES: No eye pain, visual disturbances, or discharge  ENMT:  No sore throat  NECK: No pain or stiffness  RESPIRATORY: No cough, wheezing, chills or hemoptysis; No shortness of breath  CARDIOVASCULAR: No chest pain, palpitations, dizziness, or leg swelling  GASTROINTESTINAL: No abdominal or epigastric pain. No nausea, vomiting, or hematemesis; No diarrhea or constipation. No melena or hematochezia.  GENITOURINARY: No dysuria, frequency, hematuria, or incontinence  NEUROLOGICAL: No headaches, memory loss, loss of strength, numbness, or tremors  SKIN: No itching, burning, rashes, or lesions   LYMPH NODES: No enlarged glands  MUSCULOSKELETAL: No joint pain or swelling; No muscle, back, or extremity pain      PAST MEDICAL & SURGICAL HISTORY:  Atrial tachycardia    Ventricular septal defect    History of orthostatic hypotension    Single kidney    S/p nephrectomy  donated kidney- left 1984        Allergies    No Known Allergies    Intolerances        FAMILY HISTORY:  Family history of prostate cancer (Father)        SOCIAL HISTORY:    No smoking, ivdu, etoh    MEDICATIONS  (STANDING):  cefTRIAXone   IVPB      cefTRIAXone   IVPB 1000 milliGRAM(s) IV Intermittent every 24 hours  influenza   Vaccine 0.5 milliLiter(s) IntraMuscular once  metoprolol succinate ER 25 milliGRAM(s) Oral daily  rivaroxaban 15 milliGRAM(s) Oral two times a day with meals  sodium chloride 0.9%. 1000 milliLiter(s) (75 mL/Hr) IV Continuous <Continuous>    MEDICATIONS  (PRN):  acetaminophen   Tablet .. 650 milliGRAM(s) Oral every 6 hours PRN Moderate Pain (4 - 6)      Vital Signs Last 24 Hrs  T(C): 36.7 (05 Nov 2020 11:46), Max: 36.7 (05 Nov 2020 11:46)  T(F): 98 (05 Nov 2020 11:46), Max: 98 (05 Nov 2020 11:46)  HR: 60 (05 Nov 2020 11:46) (55 - 62)  BP: 149/85 (05 Nov 2020 11:46) (113/62 - 149/85)  BP(mean): --  RR: 17 (05 Nov 2020 11:46) (17 - 17)  SpO2: 96% (05 Nov 2020 11:46) (96% - 99%)    PHYSICAL EXAM:    GENERAL: NAD, well-groomed  HEAD:  Atraumatic, Normocephalic  EYES: EOMI, PERRLA, conjunctiva and sclera clear  ENMT: No tonsillar erythema, exudates, or enlargement; Moist mucous membranes  NECK: Supple, No JVD  CHEST/LUNG: Clear to percussion bilaterally; No rales, rhonchi, wheezing, or rubs  HEART: Regular rate and rhythm; No murmurs, rubs, or gallops  ABDOMEN: Soft, Nontender, Nondistended; Bowel sounds present  EXTREMITIES:  2+ Peripheral Pulses, No clubbing, cyanosis, or edema  LYMPH: No lymphadenopathy noted  SKIN: No rashes or lesions    LABS:  CBC Full  -  ( 05 Nov 2020 06:20 )  WBC Count : 7.90 K/uL  RBC Count : 4.20 M/uL  Hemoglobin : 11.5 g/dL  Hematocrit : 36.4 %  Platelet Count - Automated : 231 K/uL  Mean Cell Volume : 86.7 fL  Mean Cell Hemoglobin : 27.4 pg  Mean Cell Hemoglobin Concentration : 31.6 %  Auto Neutrophil # : x  Auto Lymphocyte # : x  Auto Monocyte # : x  Auto Eosinophil # : x  Auto Basophil # : x  Auto Neutrophil % : x  Auto Lymphocyte % : x  Auto Monocyte % : x  Auto Eosinophil % : x  Auto Basophil % : x      11-05    137  |  104  |  24<H>  ----------------------------<  114<H>  4.6   |  20<L>  |  0.98    Ca    8.9      05 Nov 2020 06:20  Phos  4.9     11-05  Mg     2.1     11-05    TPro  7.1  /  Alb  3.6  /  TBili  0.4  /  DBili  x   /  AST  69<H>  /  ALT  100<H>  /  AlkPhos  71  11-05      LIVER FUNCTIONS - ( 05 Nov 2020 06:20 )  Alb: 3.6 g/dL / Pro: 7.1 g/dL / ALK PHOS: 71 u/L / ALT: 100 u/L / AST: 69 u/L / GGT: x                               MICROBIOLOGY:      Culture - Urine (10.30.20 @ 05:10)   - Amikacin: S <=16   - Amoxicillin/Clavulanic Acid: S <=8/4   - Ampicillin: R >16 These ampicillin results predict results for amoxicillin   - Ampicillin/Sulbactam: S <=4/2 Enterobacter, Citrobacter, and Serratia may develop resistance during prolonged therapy (3-4 days)   - Aztreonam: S <=4   - Cefazolin: S <=2 ([id number]) For uncomplicated UTI with K. pneumoniae, E. coli, or P. mirablis: JENNA <=16 is sensitive and JENNA >=32 is resistant. This also predicts results for oral agents cefaclor, cefdinir, cefpodoxime, cefprozil, cefuroxime axetil, cephalexin and locarbef for uncomplicated UTI. Note that some isolates may be susceptible to these agents while testing resistant to cefazolin.   - Cefepime: S <=2   - Cefoxitin: S <=8   - Ceftriaxone: S <=1 Enterobacter, Citrobacter, and Serratia may develop resistance during prolonged therapy   - Ciprofloxacin: S <=0.25   - Ertapenem: S <=0.5   - Gentamicin: S <=2   - Imipenem: S <=1   - Levofloxacin: S <=0.5   - Meropenem: S <=1   - Nitrofurantoin: S <=32 Should not be used to treat pyelonephritis   - Piperacillin/Tazobactam: S <=8   - Tigecycline: S <=2   - Tobramycin: S <=2   - Trimethoprim/Sulfamethoxazole: S <=0.5/9.5   Specimen Source: .Urine Clean Catch (Midstream)   Culture Results:   >100,000 CFU/ml Klebsiella pneumoniae   Organism Identification: Klebsiella pneumoniae   Organism: Klebsiella pneumoniae   Method Type: JENNA       COVID-19 Antibody - inpatient disease monitoring (11.03.20 @ 11:55)   COVID-19 IgA Antibody Index: 0.4: [employer] CAPRICE   Negative Result:   Equivocal Result: 0.80 - 1.09 Ratio   Positive Result: >/= 1.10 Ratio Ratio   COVID-19 IgA Antibody Interpretation: Negative: This test has not been reviewed by the FDA by the standard review   process. It has been authorized for emergency use by the FDA. [facility] Communication has validated this test to be accurate.   Negative results do not rule out SARS-CoV-2 infection, particularly in   those who have been in recent contact with the virus. Follow-up testing   with a molecular diagnostic test should be considered to rule out   infection in these individuals.   Results from antibody testing should not be used as thesole basis to   diagnose or exclude SARS-CoV-2 infection, or to inform infection status.   Positive results may rarely be due to past or present infection with   non-SARS-CoV-2 coronavirus strains, such as coronavirus HKU1, NL63, OC43,   or 229E. [facility] Communication, through extensive validation   testing, has confirmed that this risk is minimal with this test.   COVID-19 IgG Antibody Index: 0.20: [employer] [facility]ICE   Negative Result: <= 0.79 Ratio   Equivocal Result: 0.80 - 1.09 Ratio   Positive Result: >= 1.10 Ratio Ratio   COVID-19 IgG Antibody Interpretation: Negative: This test has not been reviewed by the FDA by the standard review   process. It has been authorized for emergency use by the FDA. [facility] Communication has validated this test to be accurate.   Negative results do not rule out SARS-CoV-2 infection, particularly in   those who have been in recent contact with the virus. Follow-up testing   with a molecular diagnostic test should be considered to rule out   infection in these individuals.   Results from antibody testing should not be used as thesole basis to   diagnose or exclude SARS-CoV-2 infection, or to inform infection status.   Positive results may rarely be due to past or present infection with   non-SARS-CoV-2 coronavirus strains, such as coronavirus HKU1, NL63, OC43,   or 229E. [facility] Communication, through extensive validation   testing, has confirmed that this risk is minimal with this test.             RADIOLOGY:    < from: CT Abdomen and Pelvis w/ IV Cont (11.04.20 @ 19:25) >  EXAM:  CT ABDOMEN AND PELVIS IC        PROCEDURE DATE:  Nov 4 2020         INTERPRETATION:  CLINICAL INFORMATION: Recent diagnosis of pulmonary emboli. Evaluate for malignancy.    COMPARISON: None.    PROCEDURE:  CT of the Abdomen and Pelvis was performed with intravenous contrast.  Intravenous contrast: 90 ml Omnipaque 350. 10 ml discarded.  Oral contrast: None.  Sagittal and coronal reformats were performed.    FINDINGS:  LOWER CHEST: Within normal limits.    LIVER: Few scattered hypoattenuating foci, likely cysts.  BILE DUCTS: Normal caliber.  GALLBLADDER: Tiny stones/sludge.  SPLEEN: Within normal limits.  PANCREAS: Within normal limits. Pancreas divisum.  ADRENALS: Within normal limits.  KIDNEYS/URETERS: Status post right nephrectomy.Left parapelvic cyst. Mild prominence of the left intrarenal collecting system, likely due to the course of the left ureter. Cortical scarring.    BLADDER: Within normal limits.  REPRODUCTIVE ORGANS: Dilated pelvic veins. Uterus and adnexa are withinnormal limits.    BOWEL: No bowel obstruction. Appendix is normal.  PERITONEUM: No ascites.  VESSELS: Trace atherosclerotic changes.  RETROPERITONEUM/LYMPH NODES: No lymphadenopathy.  ABDOMINAL WALL: Within normal limits.  BONES: Degenerative changes.    IMPRESSION:  No evidence of a neoplastic process in the abdomen and pelvis.    < end of copied text >        < from: US Abdomen Limited (10.30.20 @ 14:44) >    EXAM:  US ABDOMEN LIMITED        PROCEDURE DATE:  Oct 30 2020         INTERPRETATION:  CLINICAL INFORMATION: Liver enzymes are increased, shortness of breath    COMPARISON: None available.    TECHNIQUE: Sonography of the right upper quadrant.    FINDINGS:    Liver: 1.7 cm. Simple cyst within the left lobe of the liver.  Bile ducts: Normal caliber. Common bile duct measures 4 mm.  Gallbladder: Within normal limits.  Pancreas: Visualized portions are within normal limits.  Right kidney: Status post right nephrectomy  Left kidney: 15.1 cm. No hydronephrosis.  Ascites: None.  IVC: Visualized portions are within normal limits.    IMPRESSION:    No sonographic evidence of acute cholecystitis. No cholelithiasis. Normal caliber bile ducts.    < end of copied text >        < from: CT Angio Chest w/ IV Cont (10.29.20 @ 23:38) >  EXAM:  CT ANGIO CHEST (W)AW IC        PROCEDURE DATE:  Oct 29 2020         INTERPRETATION:  EXAMINATION: CT ANGIO CHEST WITHOUT AND OR WITH IV CONTRAST    CLINICAL INDICATION: palpitations, SOB, hypoxia  r/o PE    rule out PE    TECHNIQUE: CTA of the chest was performed for evaluation of pulmonary embolism after administration of 90 ml of Omnipaque-350, 10 ml discarded.  MIP images were reconstructed.    COMPARISON: None.    FINDINGS:    PULMONARY ARTERIES: Pulmonary emboli in all 5 lobes extending proximally on the right from the peripheral main pulmonary artery and proximally on the left from both central lumbar arteries.    AIRWAYS AND LUNGS: The central tracheobronchial tree is patent.  Right upper lobe 6 mm nodule.    MEDIASTINUM AND PLEURA: There are no enlarged mediastinal, hilar or axillary lymph nodes. The visualized portion of the thyroid gland is unremarkable. There is no pleural effusion. There is no pneumothorax.    HEART AND VESSELS: The heart is normal in size. Prominent right heart with enlarged pulmonary artery.  There are no atherosclerotic calcifications of the aorta.  There is no pericardial effusion.    UPPER ABDOMEN: Images of the upper abdomen demonstrate right periaortic postsurgical changes. Left hepatic cyst.    BONES AND SOFT TISSUES: The bones are unremarkable.  The soft tissues are unremarkable.    TUBES/LINES: None.    IMPRESSION:  Extensive pulmonary embolism. Suggestion right heart strain. Echo recommended for complete evaluation.    Right upper lobe 6 mm nodule is indeterminate. 6 month follow-up CT recommended for complete evaluation.    < end of copied text >      < from: Xray Chest 2 Views PA/Lat (10.29.20 @ 17:47) >  FINDINGS:    The lungs are clear.  There is no pneumothorax or pleural effusion.  Cardiomegaly.  No acute osseous abnormality.  Surgical clips seen over the midabdomen.    IMPRESSION:    Clear lungs.    < end of copied text >

## 2020-11-05 NOTE — DISCHARGE NOTE NURSING/CASE MANAGEMENT/SOCIAL WORK - PATIENT PORTAL LINK FT
You can access the FollowMyHealth Patient Portal offered by [facility] by registering at the following website: [email or web address]. By joining [facility]’s FollowMyHealth portal, you will also be able to view your health information using other applications (apps) compatible with our system.

## 2020-11-05 NOTE — CONSULT NOTE ADULT - PROBLEM SELECTOR RECOMMENDATION 9
-US and CT without acute gi pathology of concern  -hepatitis panel negative  -multifactorial in setting of Abx use for UTI and reactive from PE w/right heart strain  -avoid unnecessary hepatotoxins; ?can abx be switched, fu with ID   -recommend repeat LFTs 1 week post discharge
Submassive PE in 5 lobes extending proximally on the right from the peripheral main pulmonary artery and proximally on the left from both central lumbar arteries. Elevated BNP 2202. POCUS with preserved LV systolic function, enlarged RV, septal bounce, and Freeman's sign. HD stable with SO2 > 95% on 2L nc. Unclear if provoked or unprovoked  - c/w Heparin gtt for now, will need AC for at least 3 months  - f/u official TTE and LE Duplex  - c/w tele monitoring  - Age-appropriate cancer screen, endorses up to date with mammogram but last colonoscopy long time ago with unclear findings    Bernard Mac MD  Pulmonary & Critical Care Fellow  [phone]

## 2020-11-05 NOTE — CONSULT NOTE ADULT - ASSESSMENT
65F with PMHx AT s/p failed ablation (not performed in 2015), positive tilt table/orthostatic hypotension, VSD, non-obstructive CAD, PVCs, chronic palpitations, renal donor (s/p L nephrectomy for son) presenting from [facility] for palpitations and SOB at rest. Pt initially started having palpitations 5 years ago but was unable to have ablation then. Since patient has on and off palpitations but has been able to perform ADLs. On 10/9 seen by Dr. Quijano and noted to have runs of Atach on holter monitor and plan was for EP study and ablation. Today patient presented to [facility] and noted to have palpitations, SOB at rest over last 6 days and TWI on EKG done. Sent to ED. Per patient she recently [place] and had mild SOB prior to this trip 6 days ago which worsened during her stay. ET only about 10 steps but formerly multiple blocks. Denies orthopnea to me (endorsed to other provider). She states SOB is specifically when she gets palpitations which have started to have more in frequency and severity. At this point it is limiting her ADLs. Of note she also had CP left sided, nonradiating 3 days ago occurring overnight. This self resolved on its own and she hasn't had since. Denies hx prior CHF. Endorses dysuria 5 days ago now resolved, but denies fevers, chills, polyuria, foul smelling urine. Also endorses leg swelling b/l but now improved today. She only noticed this yesterday. (29 Oct 2020 21:07)    Pt p/w SOB, CTA chest with extensive PE in all 5 lobes and LE dopplers with acute Rt peroneal and PT vein DVT.  Pt was on hep gtt, now on xarelto.  CTap no mass.  Heme w/u for hypercoagulability in progress.  Pt on rocephin for klebsiella UTI.  ID consulted for further abx managment.     UTI:    - Currently w/o dysuria, freq, urgency.  UA mod LE.  Ucx Klebsiella.    - On rocephin, can de-escalate to PO ceftin 250mg bid to complete total 7 days.      Joellen Nunezi  [phone]

## 2020-11-05 NOTE — PROGRESS NOTE ADULT - ASSESSMENT
Assessment and Plan    65F with PMHx AT s/p failed ablation (not performed in 2015), positive tilt table/orthostatic hypotension, VSD, non-obstructive CAD, PVCs, chronic palpitations, renal donor (s/p L nephrectomy for son) presenting from PST for palpitations and SOB with exertion    EKG: NSR with twave inversions in leads AVF, III, V3-V6  Tele: NSR 60-70s with PAT    1. SOB  -patient states has SOB on exertion for past week. improving today  -CTA chest with extensive PE in all 5 lobes. on hep gtt. f/u MICU consult appreciated   -currently hemodynamically stable and not requiring oxygen at rest  -right heart strain noted on CTA chest, echo with Dilated RV and Decreased RV function , No troponin elevation , RV normal on MRI on 9/23/2020   -LE dopplers with acute Rt peroneal and PT vein DVT   -hep gtt switched to Xarelto  -appreciate heme eval for hypercoagulable work up.  CT A/P negative for mass    2. Palpitations  -patient diagnosed with AT and was getting workup for outpatient ablation  -no current plans for ablation   -f/u EP    3. Transaminitis  -patient with elevated LFTs  -denies abdominal pain  -abd US negative, f/u GI    4. UTI   on abx f/u ID  Patient has outpatient tele follow up scheduled for Thursday November 12th

## 2020-11-05 NOTE — DISCHARGE NOTE NURSING/CASE MANAGEMENT/SOCIAL WORK - NSDCFUADDAPPT_GEN_ALL_CORE_FT
If you are in need of a general medicine physician and post-discharge medical follow-up for further care/recommendations you may contact the [facility] for an appointment [phone]    You have an outpatient tele health appointment follow up scheduled for Thursday November 12th

## 2020-11-05 NOTE — DIETITIAN INITIAL EVALUATION ADULT. - OTHER INFO
Pt 64 yo female appears alert, oriented. At time of visit Pt on her phone (talking to her grandson); Pt showed no interest to speak to RDN.   Per Pt her appetite usually good; no report of chewing or swallowing difficulty; no report of nausea, vomiting or diarrhea @ this time. +BM (11/5) - per flow sheet.   Per Pt her height: 67" & her weight: 196#; no report of weight loss or weight changes PTA. No food related question/concern voiced @ present. RDN remains available, Pt made aware.

## 2020-11-05 NOTE — PROGRESS NOTE ADULT - SUBJECTIVE AND OBJECTIVE BOX
Interval History:  Patient resting comfortably in bed   Denies CP/SOB/palpitations/dizziness.  No acute events overnight.      Medications:  acetaminophen   Tablet .. 650 milliGRAM(s) Oral every 6 hours PRN  cefTRIAXone   IVPB      cefTRIAXone   IVPB 1000 milliGRAM(s) IV Intermittent every 24 hours  influenza   Vaccine 0.5 milliLiter(s) IntraMuscular once  metoprolol succinate ER 25 milliGRAM(s) Oral daily  rivaroxaban 15 milliGRAM(s) Oral two times a day with meals  sodium chloride 0.9%. 1000 milliLiter(s) IV Continuous <Continuous>      Vitals:  T(C): 36.7 (11-05-20 @ 11:46), Max: 36.7 (11-05-20 @ 11:46)  HR: 60 (11-05-20 @ 11:46) (55 - 62)  BP: 149/85 (11-05-20 @ 11:46) (113/62 - 149/85)  BP(mean): --  RR: 17 (11-05-20 @ 11:46) (17 - 17)  SpO2: 96% (11-05-20 @ 11:46) (96% - 99%)      I&O's Summary    04 Nov 2020 07:01  -  05 Nov 2020 07:00  --------------------------------------------------------  IN: 920 mL / OUT: 550 mL / NET: 370 mL        Physical Exam:  Appearance: No Acute Distress  HEENT: PERRL  Cardiovascular: Normal S1 S2, No murmurs/rubs/gallops  Respiratory: Clear to auscultation bilaterally  Gastrointestinal: Soft, Non-tender	  Skin: No cyanosis	  Neurologic: Non-focal  Extremities: No LE edema  Psychiatry: A & O x 3, Mood & affect appropriate    Labs:                        11.5   7.90  )-----------( 231      ( 05 Nov 2020 06:20 )             36.4     11-05    137  |  104  |  24<H>  ----------------------------<  114<H>  4.6   |  20<L>  |  0.98    Ca    8.9      05 Nov 2020 06:20  Phos  4.9     11-05  Mg     2.1     11-05    TPro  7.1  /  Alb  3.6  /  TBili  0.4  /  DBili  x   /  AST  69<H>  /  ALT  100<H>  /  AlkPhos  71  11-05    PTT - ( 04 Nov 2020 05:58 )  PTT:35.6 SEC      TELEMETRY: Sinus Rhythm with brief episode ATach overnight

## 2020-11-05 NOTE — CONSULT NOTE ADULT - PROBLEM SELECTOR RECOMMENDATION 3
-on abx; bacid tid   -abx likely to be contributing to increase in lft's  -fu ID recs if abx can be changed to less hepatotoxic agent

## 2020-11-05 NOTE — CONSULT NOTE ADULT - ASSESSMENT
65F with PMHx AT s/p failed ablation (not performed in 2015), positive tilt table/orthostatic hypotension, VSD, non-obstructive CAD, PVCs, chronic palpitations, renal donor (s/p L nephrectomy for son) presenting from PST for palpitations and SOB at rest found to have extensive PE w/right heart strain now on a/c and UTI started on ceftriaxone. GI consulted for increasing LFTs

## 2020-11-05 NOTE — PROGRESS NOTE ADULT - SUBJECTIVE AND OBJECTIVE BOX
Syed Mckay MD  Interventional Cardiology / Advance Heart Failure and Cardiac Transplant Specialist  [place] Office : [address] [place] [address] [place]. [address]  Tel:   [place] Office : [address] [facility] [place] [address]  Tel: [phone]  Cell : [phone]    Pt is lying in bed comfortable not in distress, no chest pains no SOB no palpitations  	  MEDICATIONS:  metoprolol succinate ER 25 milliGRAM(s) Oral daily  rivaroxaban 15 milliGRAM(s) Oral two times a day with meals    cefTRIAXone   IVPB      cefTRIAXone   IVPB 1000 milliGRAM(s) IV Intermittent every 24 hours      acetaminophen   Tablet .. 650 milliGRAM(s) Oral every 6 hours PRN    aluminum hydroxide/magnesium hydroxide/simethicone Suspension 30 milliLiter(s) Oral every 6 hours PRN  pantoprazole    Tablet 40 milliGRAM(s) Oral before breakfast  polyethylene glycol 3350 17 Gram(s) Oral two times a day PRN  senna 2 Tablet(s) Oral at bedtime      influenza   Vaccine 0.5 milliLiter(s) IntraMuscular once  sodium chloride 0.9%. 1000 milliLiter(s) IV Continuous <Continuous>      PAST MEDICAL/SURGICAL HISTORY  PAST MEDICAL & SURGICAL HISTORY:  Atrial tachycardia    Ventricular septal defect    History of orthostatic hypotension    Single kidney    S/p nephrectomy  donated kidney- left 1984        SOCIAL HISTORY: Substance Use (street drugs): ( x ) never used  (  ) other:    FAMILY HISTORY:  Family history of prostate cancer (Father)        REVIEW OF SYSTEMS:  CONSTITUTIONAL: No fever, weight loss, or fatigue  EYES: No eye pain, visual disturbances, or discharge  ENMT:  No difficulty hearing, tinnitus, vertigo; No sinus or throat pain  BREASTS: No pain, masses, or nipple discharge  GASTROINTESTINAL: No abdominal or epigastric pain. No nausea, vomiting, or hematemesis; No diarrhea or constipation. No melena or hematochezia.  GENITOURINARY: No dysuria, frequency, hematuria, or incontinence  NEUROLOGICAL: No headaches, memory loss, loss of strength, numbness, or tremors  ENDOCRINE: No heat or cold intolerance; No hair loss  MUSCULOSKELETAL: No joint pain or swelling; No muscle, back, or extremity pain  PSYCHIATRIC: No depression, anxiety, mood swings, or difficulty sleeping  HEME/LYMPH: No easy bruising, or bleeding gums  All others negative    PHYSICAL EXAM:  T(C): 36.7 (11-05-20 @ 11:46), Max: 36.7 (11-05-20 @ 11:46)  HR: 60 (11-05-20 @ 11:46) (55 - 62)  BP: 149/85 (11-05-20 @ 11:46) (113/62 - 149/85)  RR: 17 (11-05-20 @ 11:46) (17 - 17)  SpO2: 96% (11-05-20 @ 11:46) (96% - 99%)  Wt(kg): --  I&O's Summary    04 Nov 2020 07:01  -  05 Nov 2020 07:00  --------------------------------------------------------  IN: 920 mL / OUT: 550 mL / NET: 370 mL          GENERAL: NAD  EYES: EOMI, PERRLA, conjunctiva and sclera clear  ENMT: No tonsillar erythema, exudates, or enlargement; Moist mucous membranes, Good dentition, No lesions  Cardiovascular: Normal S1 S2, No JVD, No murmurs, No edema  Respiratory: Lungs clear to auscultation	  Gastrointestinal:  Soft, Non-tender, + BS	  Extremities: Normal range of motion, No clubbing, cyanosis or edema  LYMPH: No lymphadenopathy noted  NERVOUS SYSTEM:  Alert & Oriented X3, Good concentration; Motor Strength 5/5 B/L upper and lower extremities; DTRs 2+ intact and symmetric                                    11.5   7.90  )-----------( 231      ( 05 Nov 2020 06:20 )             36.4     11-05    137  |  104  |  24<H>  ----------------------------<  114<H>  4.6   |  20<L>  |  0.98    Ca    8.9      05 Nov 2020 06:20  Phos  4.9     11-05  Mg     2.1     11-05    TPro  7.1  /  Alb  3.6  /  TBili  0.4  /  DBili  x   /  AST  69<H>  /  ALT  100<H>  /  AlkPhos  71  11-05    proBNP:   Lipid Profile:   HgA1c:   TSH:     Consultant(s) Notes Reviewed:  [x ] YES  [ ] NO    Care Discussed with Consultants/Other Providers [ x] YES  [ ] NO    Imaging Personally Reviewed independently:  [x] YES  [ ] NO    All labs, radiologic studies, vitals, orders and medications list reviewed. Patient is seen and examined at bedside. Case discussed with medical team.

## 2020-11-05 NOTE — DIETITIAN INITIAL EVALUATION ADULT. - NUTRITION CONSULT
[FreeTextEntry1] : Blood work was drawn and sent to the lab today. The patient has been instructed to call the office next week to discuss today's lab work.\par \par STD testing added to blood work at pt request\par \par Routine health counselling provided.\par Advised to wear helmet when biking\par \par Safe sex encouraged at all times\par \par Flu vaccine given\par \par Trial of GasX\par Return to GI for EGD\par \par Annual CC
no

## 2020-11-06 VITALS
OXYGEN SATURATION: 100 % | TEMPERATURE: 98 F | HEART RATE: 67 BPM | SYSTOLIC BLOOD PRESSURE: 121 MMHG | DIASTOLIC BLOOD PRESSURE: 63 MMHG | RESPIRATION RATE: 15 BRPM

## 2020-11-06 LAB
ALBUMIN SERPL ELPH-MCNC: 3.9 G/DL — SIGNIFICANT CHANGE UP (ref 3.3–5)
ALP SERPL-CCNC: 73 U/L — SIGNIFICANT CHANGE UP (ref 40–120)
ALT FLD-CCNC: 84 U/L — HIGH (ref 4–33)
ANION GAP SERPL CALC-SCNC: 13 MMO/L — SIGNIFICANT CHANGE UP (ref 7–14)
AST SERPL-CCNC: 43 U/L — HIGH (ref 4–32)
BASOPHILS # BLD AUTO: 0.04 K/UL — SIGNIFICANT CHANGE UP (ref 0–0.2)
BASOPHILS NFR BLD AUTO: 0.5 % — SIGNIFICANT CHANGE UP (ref 0–2)
BILIRUB SERPL-MCNC: 0.5 MG/DL — SIGNIFICANT CHANGE UP (ref 0.2–1.2)
BUN SERPL-MCNC: 26 MG/DL — HIGH (ref 7–23)
CALCIUM SERPL-MCNC: 8.7 MG/DL — SIGNIFICANT CHANGE UP (ref 8.4–10.5)
CHLORIDE SERPL-SCNC: 105 MMOL/L — SIGNIFICANT CHANGE UP (ref 98–107)
CO2 SERPL-SCNC: 22 MMOL/L — SIGNIFICANT CHANGE UP (ref 22–31)
CREAT SERPL-MCNC: 1.04 MG/DL — SIGNIFICANT CHANGE UP (ref 0.5–1.3)
EOSINOPHIL # BLD AUTO: 0.13 K/UL — SIGNIFICANT CHANGE UP (ref 0–0.5)
EOSINOPHIL NFR BLD AUTO: 1.8 % — SIGNIFICANT CHANGE UP (ref 0–6)
FERRITIN SERPL-MCNC: 181 NG/ML — HIGH (ref 15–150)
FOLATE SERPL-MCNC: 18.9 NG/ML — SIGNIFICANT CHANGE UP (ref 4.7–20)
GLUCOSE SERPL-MCNC: 100 MG/DL — HIGH (ref 70–99)
HAPTOGLOB SERPL-MCNC: 204 MG/DL — HIGH (ref 34–200)
HCT VFR BLD CALC: 36.8 % — SIGNIFICANT CHANGE UP (ref 34.5–45)
HGB BLD-MCNC: 11.4 G/DL — LOW (ref 11.5–15.5)
IMM GRANULOCYTES NFR BLD AUTO: 0.5 % — SIGNIFICANT CHANGE UP (ref 0–1.5)
IRON SATN MFR SERPL: 314 UG/DL — SIGNIFICANT CHANGE UP (ref 140–530)
IRON SATN MFR SERPL: 84 UG/DL — SIGNIFICANT CHANGE UP (ref 30–160)
LDH SERPL L TO P-CCNC: 226 U/L — HIGH (ref 135–225)
LYMPHOCYTES # BLD AUTO: 2.74 K/UL — SIGNIFICANT CHANGE UP (ref 1–3.3)
LYMPHOCYTES # BLD AUTO: 37.4 % — SIGNIFICANT CHANGE UP (ref 13–44)
MAGNESIUM SERPL-MCNC: 2.1 MG/DL — SIGNIFICANT CHANGE UP (ref 1.6–2.6)
MCHC RBC-ENTMCNC: 27 PG — SIGNIFICANT CHANGE UP (ref 27–34)
MCHC RBC-ENTMCNC: 31 % — LOW (ref 32–36)
MCV RBC AUTO: 87.2 FL — SIGNIFICANT CHANGE UP (ref 80–100)
MONOCYTES # BLD AUTO: 0.37 K/UL — SIGNIFICANT CHANGE UP (ref 0–0.9)
MONOCYTES NFR BLD AUTO: 5 % — SIGNIFICANT CHANGE UP (ref 2–14)
NEUTROPHILS # BLD AUTO: 4.01 K/UL — SIGNIFICANT CHANGE UP (ref 1.8–7.4)
NEUTROPHILS NFR BLD AUTO: 54.8 % — SIGNIFICANT CHANGE UP (ref 43–77)
NRBC # FLD: 0 K/UL — SIGNIFICANT CHANGE UP (ref 0–0)
PHOSPHATE SERPL-MCNC: 4.8 MG/DL — HIGH (ref 2.5–4.5)
PLATELET # BLD AUTO: 235 K/UL — SIGNIFICANT CHANGE UP (ref 150–400)
PMV BLD: 11.5 FL — SIGNIFICANT CHANGE UP (ref 7–13)
POTASSIUM SERPL-MCNC: 4.7 MMOL/L — SIGNIFICANT CHANGE UP (ref 3.5–5.3)
POTASSIUM SERPL-SCNC: 4.7 MMOL/L — SIGNIFICANT CHANGE UP (ref 3.5–5.3)
PROT SERPL-MCNC: 6.8 G/DL — SIGNIFICANT CHANGE UP (ref 6–8.3)
RBC # BLD: 4.22 M/UL — SIGNIFICANT CHANGE UP (ref 3.8–5.2)
RBC # FLD: 15 % — HIGH (ref 10.3–14.5)
RETICS #: 95 K/UL — SIGNIFICANT CHANGE UP (ref 25–125)
RETICS/RBC NFR: 2.2 % — SIGNIFICANT CHANGE UP (ref 0.5–2.5)
SODIUM SERPL-SCNC: 140 MMOL/L — SIGNIFICANT CHANGE UP (ref 135–145)
UIBC SERPL-MCNC: 229.6 UG/DL — SIGNIFICANT CHANGE UP (ref 110–370)
WBC # BLD: 7.33 K/UL — SIGNIFICANT CHANGE UP (ref 3.8–10.5)
WBC # FLD AUTO: 7.33 K/UL — SIGNIFICANT CHANGE UP (ref 3.8–10.5)

## 2020-11-06 PROCEDURE — 99232 SBSQ HOSP IP/OBS MODERATE 35: CPT

## 2020-11-06 RX ORDER — PANTOPRAZOLE SODIUM 20 MG/1
1 TABLET, DELAYED RELEASE ORAL
Qty: 30 | Refills: 0
Start: 2020-11-06 | End: 2020-12-05

## 2020-11-06 RX ORDER — METOPROLOL TARTRATE 50 MG
1 TABLET ORAL
Qty: 0 | Refills: 0 | DISCHARGE

## 2020-11-06 RX ORDER — POLYETHYLENE GLYCOL 3350 17 G/17G
17 POWDER, FOR SOLUTION ORAL
Qty: 0 | Refills: 0 | DISCHARGE
Start: 2020-11-06

## 2020-11-06 RX ORDER — METOPROLOL TARTRATE 50 MG
0.5 TABLET ORAL
Qty: 15 | Refills: 0
Start: 2020-11-06 | End: 2020-12-05

## 2020-11-06 RX ORDER — CEFUROXIME AXETIL 250 MG
1 TABLET ORAL
Qty: 7 | Refills: 0
Start: 2020-11-06

## 2020-11-06 RX ORDER — METOPROLOL TARTRATE 50 MG
12.5 TABLET ORAL DAILY
Refills: 0 | Status: DISCONTINUED | OUTPATIENT
Start: 2020-11-06 | End: 2020-11-06

## 2020-11-06 RX ORDER — SENNA PLUS 8.6 MG/1
2 TABLET ORAL
Qty: 0 | Refills: 0 | DISCHARGE
Start: 2020-11-06

## 2020-11-06 RX ORDER — ACETAMINOPHEN 500 MG
2 TABLET ORAL
Qty: 0 | Refills: 0 | DISCHARGE
Start: 2020-11-06

## 2020-11-06 RX ADMIN — Medication 250 MILLIGRAM(S): at 05:43

## 2020-11-06 RX ADMIN — SODIUM CHLORIDE 75 MILLILITER(S): 9 INJECTION INTRAMUSCULAR; INTRAVENOUS; SUBCUTANEOUS at 07:34

## 2020-11-06 RX ADMIN — PANTOPRAZOLE SODIUM 40 MILLIGRAM(S): 20 TABLET, DELAYED RELEASE ORAL at 05:44

## 2020-11-06 RX ADMIN — RIVAROXABAN 15 MILLIGRAM(S): KIT at 07:33

## 2020-11-06 NOTE — PROGRESS NOTE ADULT - ATTENDING COMMENTS
65F AT s/p failed ablation, positive tilt table/orthostatic hypotension, non-obstructive CAD, PVCs, chronic palpitations, renal donor (s/p L nephrectomy for son) presenting from PST for palpitations and SOB at rest in setting of recent travel to the [facility] [place]. Found to have PE on CTA chest 10/29 with evidence of RV strain. Ablation postponed for future date given recent PE. Start AC. FU as outpt.
65F AT s/p failed ablation, positive tilt table/orthostatic hypotension, non-obstructive CAD, PVCs, chronic palpitations, renal donor (s/p L nephrectomy for son) presenting from PST for palpitations and SOB at rest in setting of recent travel to the [place]. Found to have PE on CTA chest 10/29 with evidence of RV strain. Will postpone ablation for future date given recent PE. Start AC. FU as outpt.
64 yo F w/ AT (s/p failed ablation 2015), orthostatic hypotension (positive tilt table), ?VSD (not visualized on 9/2020 cardiac MRI), CAD, renal donor (s/p L nephrectomy for son) p/w submassive PE. Pt doing well, currently on RA. Walking to bathroom w/o difficulty. Denies other cardiopulmonary sx.   Agree w/ transition to NOAC. Thrombophilia w/u as outpt.
65F AT s/p failed ablation, positive tilt table/orthostatic hypotension, non-obstructive CAD, PVCs, chronic palpitations, renal donor (s/p L nephrectomy for son) presenting from PST for palpitations and SOB at rest in setting of recent travel to the [place]. Found to have PE on CTA chest 10/29 with evidence of RV strain. Will postpone ablation for future date given recent PE. Start AC. FU as outpt.
65F AT s/p failed ablation, positive tilt table/orthostatic hypotension, non-obstructive CAD, PVCs, chronic palpitations, renal donor (s/p L nephrectomy for son) presenting from PST for palpitations and SOB at rest in setting of recent travel to the [place]. Found to have PE on CTA chest 10/29 with evidence of RV strain. Will postpone ablation for future date given recent PE. Start AC. FU as outpt.
65F AT s/p attempted ablation in ?2016, positive tilt table/orthostatic hypotension, non-obstructive CAD, PVCs, chronic palpitations, renal donor (s/p L nephrectomy for son) presenting from PST for palpitations and SOB at rest in setting of recent travel to the [facility] [place]. Found to have PE on CTA chest 10/29 with evidence of RV strain. Ablation postponed for future date given recent PE. Start AC. FU as outpt.

## 2020-11-06 NOTE — PROGRESS NOTE ADULT - ASSESSMENT
65 year old Female with PMH of  atrial tachycardia s/p failed ablation, positive tilt table/orthostatic hypotension, non-obstructive CAD, PVCs, chronic palpitations, renal donor (s/p L nephrectomy for son) who presented to ED from [facility] 10/29  (scheduled for AT ablation w/Dr. Quijano 11.3.2020) with c/o palpitations and SOB at rest. Found to have extensive PE in all five lobes on CTA chest with evidence of RV strain. Started on IV Heparin and now on Xarelto.  Recent travel to the [place].  Denies fever/chills/n/v/d, COVID PCR and Antibody negative.   TTE with dilated RV and decreased RV function (of note RV normal on MRI on 9/23/2020).   LE dopplers with acute Rt peroneal and PT vein DVT. Patient on Xarelto.    Continue telemetry monitoring.  Monitor lytes and replete K>4.0 and Mg>2.0.   Continue Xarelto 15mg twice daily  Metoprolol 25mg daily    Appreciate Cardiology recommendations   Anticipate discharge later today.  Follow up appointment with Dr. Quijano  11/30/20 at 12:30pm   65 year old Female with PMH of  atrial tachycardia s/p ?ablation in 2016 at [facility], positive tilt table/orthostatic hypotension, non-obstructive CAD, PVCs, chronic palpitations, renal donor (s/p L nephrectomy for son) who presented to ED from [facility] 10/29  (scheduled for AT ablation w/Dr. Quijano 11.3.2020) with c/o palpitations and SOB at rest. Found to have extensive PE in all five lobes on CTA chest with evidence of RV strain. Started on IV Heparin and now on Xarelto.  Recent travel to the [place].  Denies fever/chills/n/v/d, COVID PCR and Antibody negative.   TTE with dilated RV and decreased RV function (of note RV normal on MRI on 9/23/2020).   LE dopplers with acute Rt peroneal and PT vein DVT. Patient on Xarelto.    Continue telemetry monitoring.  Monitor lytes and replete K>4.0 and Mg>2.0.   Continue Xarelto 15mg twice daily  Metoprolol 25mg daily    Appreciate Cardiology recommendations   Anticipate discharge later today.  Follow up appointment with Dr. Quijano  11/30/20 at 12:30pm

## 2020-11-06 NOTE — PROGRESS NOTE ADULT - ASSESSMENT
Assessment and Plan    65F with PMHx AT s/p failed ablation (not performed in 2015), positive tilt table/orthostatic hypotension, VSD, non-obstructive CAD, PVCs, chronic palpitations, renal donor (s/p L nephrectomy for son) presenting from PST for palpitations and SOB with exertion    EKG: NSR with twave inversions in leads AVF, III, V3-V6  Tele: NSR 50-70s    1. SOB  -patient states has SOB on exertion for past week. improving today  -CTA chest with extensive PE in all 5 lobes. on hep gtt. f/u MICU consult appreciated   -currently hemodynamically stable and not requiring oxygen at rest  -right heart strain noted on CTA chest, echo with Dilated RV and Decreased RV function , No troponin elevation , RV normal on MRI on 9/23/2020   -LE dopplers with acute Rt peroneal and PT vein DVT   -hep gtt switched to Xarelto  -appreciate heme eval for hypercoagulable work up.  CT A/P negative for mass    2. Palpitations  -patient diagnosed with AT and was getting workup for outpatient ablation  -no current plans for ablation   -f/u EP    3. Transaminitis  -patient with elevated LFTs  -denies abdominal pain  -abd US negative, f/u GI    4. UTI   on abx f/u ID    Patient has outpatient tele follow up scheduled for Thursday November 12th

## 2020-11-06 NOTE — PROGRESS NOTE ADULT - SUBJECTIVE AND OBJECTIVE BOX
INTERVAL HPI/OVERNIGHT EVENTS:    feeling well   no abd pain or n/v  tolerating PO intake  LFTs improving    MEDICATIONS  (STANDING):  cefuroxime   Tablet 250 milliGRAM(s) Oral every 12 hours  influenza   Vaccine 0.5 milliLiter(s) IntraMuscular once  metoprolol succinate ER 25 milliGRAM(s) Oral daily  pantoprazole    Tablet 40 milliGRAM(s) Oral before breakfast  rivaroxaban 15 milliGRAM(s) Oral two times a day with meals  senna 2 Tablet(s) Oral at bedtime    MEDICATIONS  (PRN):  acetaminophen   Tablet .. 650 milliGRAM(s) Oral every 6 hours PRN Moderate Pain (4 - 6)  aluminum hydroxide/magnesium hydroxide/simethicone Suspension 30 milliLiter(s) Oral every 6 hours PRN Dyspepsia  polyethylene glycol 3350 17 Gram(s) Oral two times a day PRN Constipation      Allergies    No Known Allergies    Intolerances        Review of Systems:    General:  No wt loss, fevers, chills, night sweats, fatigue   Eyes:  Good vision, no reported pain  ENT:  No sore throat, pain, runny nose, dysphagia  CV:  No pain, palpitations, hypo/hypertension  Resp:  No dyspnea, cough, tachypnea, wheezing  GI:  No pain, No nausea, No vomiting, No diarrhea, No constipation, No weight loss, No fever, No pruritis, No rectal bleeding, No melena, No dysphagia  :  No pain, bleeding, incontinence, nocturia  Muscle:  No pain, weakness  Neuro:  No weakness, tingling, memory problems  Psych:  No fatigue, insomnia, mood problems, depression  Endocrine:  No polyuria, polydypsia, cold/heat intolerance  Heme:  No petechiae, ecchymosis, easy bruisability  Skin:  No rash, tattoos, scars, edema      Vital Signs Last 24 Hrs  T(C): 36.3 (06 Nov 2020 05:40), Max: 36.7 (05 Nov 2020 11:46)  T(F): 97.3 (06 Nov 2020 05:40), Max: 98 (05 Nov 2020 11:46)  HR: 52 (06 Nov 2020 05:40) (52 - 66)  BP: 141/83 (06 Nov 2020 05:40) (122/61 - 149/85)  BP(mean): --  RR: 17 (06 Nov 2020 05:40) (17 - 17)  SpO2: 96% (06 Nov 2020 05:40) (96% - 98%)    PHYSICAL EXAM:    Constitutional: NAD  HEENT: EOMI, throat clear  Neck: No LAD, supple  Respiratory: CTA and P  Cardiovascular: S1 and S2, RRR, no M  Gastrointestinal: BS+, soft, NT/ND, neg HSM,  Extremities: No peripheral edema, neg clubbing, cyanosis  Vascular: 2+ peripheral pulses  Neurological: A/O x 3, no focal deficits  Psychiatric: Normal mood, normal affect  Skin: No rashes      LABS:                        11.4   7.33  )-----------( 235      ( 06 Nov 2020 06:25 )             36.8     11-06    140  |  105  |  26<H>  ----------------------------<  100<H>  4.7   |  22  |  1.04    Ca    8.7      06 Nov 2020 06:25  Phos  4.8     11-06  Mg     2.1     11-06    TPro  6.8  /  Alb  3.9  /  TBili  0.5  /  DBili  x   /  AST  43<H>  /  ALT  84<H>  /  AlkPhos  73  11-06          RADIOLOGY & ADDITIONAL TESTS:

## 2020-11-06 NOTE — PROGRESS NOTE ADULT - REASON FOR ADMISSION
SOB and palpitations

## 2020-11-06 NOTE — PROGRESS NOTE ADULT - PROBLEM SELECTOR PLAN 1
-US and CT without acute gi pathology of concern  -hepatitis panel negative  -multifactorial in setting of Abx use for UTI and reactive from PE w/right heart strain  -avoid unnecessary hepatotoxins; LFTs improving  -recommend repeat LFTs 1 week post discharge

## 2020-11-06 NOTE — PROGRESS NOTE ADULT - SUBJECTIVE AND OBJECTIVE BOX
Patient comfortable.  Denies chest pain, shortness of breath, palpitations or lightheadedness. Wants to go home.     Vital Signs Last 24 Hrs  T(C): 36.3 (06 Nov 2020 05:40), Max: 36.7 (05 Nov 2020 11:46)  T(F): 97.3 (06 Nov 2020 05:40), Max: 98 (05 Nov 2020 11:46)  HR: 52 (06 Nov 2020 05:40) (52 - 66)  BP: 141/83 (06 Nov 2020 05:40) (122/61 - 149/85)  BP(mean): --  RR: 17 (06 Nov 2020 05:40) (17 - 17)  SpO2: 96% (06 Nov 2020 05:40) (96% - 98%)        Telemetry: Sinus rhythm 50-60's with brief episode of AT followed by sinus lucero to 40 bpm at 5:30am.     MEDICATIONS  (STANDING):  cefuroxime   Tablet 250 milliGRAM(s) Oral every 12 hours  influenza   Vaccine 0.5 milliLiter(s) IntraMuscular once  metoprolol succinate ER 25 milliGRAM(s) Oral daily  pantoprazole    Tablet 40 milliGRAM(s) Oral before breakfast  rivaroxaban 15 milliGRAM(s) Oral two times a day with meals  senna 2 Tablet(s) Oral at bedtime  sodium chloride 0.9%. 1000 milliLiter(s) (75 mL/Hr) IV Continuous <Continuous>    MEDICATIONS  (PRN):  acetaminophen   Tablet .. 650 milliGRAM(s) Oral every 6 hours PRN Moderate Pain (4 - 6)  aluminum hydroxide/magnesium hydroxide/simethicone Suspension 30 milliLiter(s) Oral every 6 hours PRN Dyspepsia  polyethylene glycol 3350 17 Gram(s) Oral two times a day PRN Constipation          Physical exam:   Gen- well developed well nourished NAD. resting comfortably in bed.  Resp-  clear to auscultation. No wheezing, rales or rhonchi  CV- S1 and S2 RRR. No murmurs, gallops or rubs  ABD- soft nontender + bowel sounds  EXT- no edema or calf tenderness  Neuro- grossly nonfocal                            11.4   7.33  )-----------( 235      ( 06 Nov 2020 06:25 )             36.8       11-06    140  |  105  |  26<H>  ----------------------------<  100<H>  4.7   |  22  |  1.04    Ca    8.7      06 Nov 2020 06:25  Phos  4.8     11-06  Mg     2.1     11-06    TPro  6.8  /  Alb  3.9  /  TBili  0.5  /  DBili  x   /  AST  43<H>  /  ALT  84<H>  /  AlkPhos  73  11-06

## 2020-11-06 NOTE — PROGRESS NOTE ADULT - SUBJECTIVE AND OBJECTIVE BOX
Syed Mckay MD  Interventional Cardiology / Endovascular Specialist  [place] Office : [address] [place] [address] [place] [address]  Tel:   [place] Office : [address] [facility] [place] [address]  Tel: [phone]  Cell : [phone]    Subjective/Overnight events: Patient seen sitting up in bed comfortably. Currently not having chest pain. SOB improved  	  MEDICATIONS:  metoprolol succinate ER 25 milliGRAM(s) Oral daily  rivaroxaban 15 milliGRAM(s) Oral two times a day with meals    cefuroxime   Tablet 250 milliGRAM(s) Oral every 12 hours      acetaminophen   Tablet .. 650 milliGRAM(s) Oral every 6 hours PRN    aluminum hydroxide/magnesium hydroxide/simethicone Suspension 30 milliLiter(s) Oral every 6 hours PRN  pantoprazole    Tablet 40 milliGRAM(s) Oral before breakfast  polyethylene glycol 3350 17 Gram(s) Oral two times a day PRN  senna 2 Tablet(s) Oral at bedtime      influenza   Vaccine 0.5 milliLiter(s) IntraMuscular once  sodium chloride 0.9%. 1000 milliLiter(s) IV Continuous <Continuous>      PAST MEDICAL/SURGICAL HISTORY  PAST MEDICAL & SURGICAL HISTORY:  Atrial tachycardia    Ventricular septal defect    History of orthostatic hypotension    Single kidney    S/p nephrectomy  donated kidney- left 1984        SOCIAL HISTORY: Substance Use (street drugs): ( x ) never used  (  ) other:    FAMILY HISTORY:  Family history of prostate cancer (Father)        REVIEW OF SYSTEMS:  CONSTITUTIONAL: No fever, weight loss, or fatigue  EYES: No eye pain, visual disturbances, or discharge  ENMT:  No difficulty hearing, tinnitus, vertigo; No sinus or throat pain  BREASTS: No pain, masses, or nipple discharge  GASTROINTESTINAL: No abdominal or epigastric pain. No nausea, vomiting, or hematemesis; No diarrhea or constipation. No melena or hematochezia.  GENITOURINARY: No dysuria, frequency, hematuria, or incontinence  NEUROLOGICAL: No headaches, memory loss, loss of strength, numbness, or tremors  ENDOCRINE: No heat or cold intolerance; No hair loss  MUSCULOSKELETAL: No joint pain or swelling; No muscle, back, or extremity pain  PSYCHIATRIC: No depression, anxiety, mood swings, or difficulty sleeping  HEME/LYMPH: No easy bruising, or bleeding gums  All others negative    PHYSICAL EXAM:  T(C): 36.3 (11-06-20 @ 05:40), Max: 36.7 (11-05-20 @ 11:46)  HR: 52 (11-06-20 @ 05:40) (52 - 66)  BP: 141/83 (11-06-20 @ 05:40) (122/61 - 149/85)  RR: 17 (11-06-20 @ 05:40) (17 - 17)  SpO2: 96% (11-06-20 @ 05:40) (96% - 98%)  Wt(kg): --  I&O's Summary    05 Nov 2020 07:01  -  06 Nov 2020 07:00  --------------------------------------------------------  IN: 200 mL / OUT: 0 mL / NET: 200 mL          GENERAL: NAD, well-groomed, well-developed  EYES: EOMI, PERRLA, conjunctiva and sclera clear  ENMT: No tonsillar erythema, exudates, or enlargement  Cardiovascular: Normal S1 S2, No JVD, No murmurs, No edema  Respiratory: Lungs diminished to auscultation	  Gastrointestinal:  Soft, Non-tender, + BS	  Extremities: Normal range of motion, No clubbing, cyanosis or edema  LYMPH: No lymphadenopathy noted  NERVOUS SYSTEM:  Alert & Oriented X3                                    11.4   7.33  )-----------( 235      ( 06 Nov 2020 06:25 )             36.8     11-06    140  |  105  |  26<H>  ----------------------------<  100<H>  4.7   |  22  |  1.04    Ca    8.7      06 Nov 2020 06:25  Phos  4.8     11-06  Mg     2.1     11-06    TPro  6.8  /  Alb  3.9  /  TBili  0.5  /  DBili  x   /  AST  43<H>  /  ALT  84<H>  /  AlkPhos  73  11-06    proBNP:   Lipid Profile:   HgA1c:   TSH:     Consultant(s) Notes Reviewed:  [x ] YES  [ ] NO    Care Discussed with Consultants/Other Providers [ x] YES  [ ] NO    Imaging Personally Reviewed independently:  [x] YES  [ ] NO    All labs, radiologic studies, vitals, orders and medications list reviewed. Patient is seen and examined at bedside. Case discussed with medical team.                 Syed Mckay MD  Interventional Cardiology / Advance Heart Failure and Cardiac Transplant Specialist  [place] Office : [address] [place] [address] [place] [address]  Tel:   [place] Office : [address] [facility] [place] [address]  Tel: [phone]  Cell : [phone]    Subjective/Overnight events: Patient seen sitting up in bed comfortably. Currently not having chest pain. SOB improved  	  MEDICATIONS:  metoprolol succinate ER 25 milliGRAM(s) Oral daily  rivaroxaban 15 milliGRAM(s) Oral two times a day with meals    cefuroxime   Tablet 250 milliGRAM(s) Oral every 12 hours      acetaminophen   Tablet .. 650 milliGRAM(s) Oral every 6 hours PRN    aluminum hydroxide/magnesium hydroxide/simethicone Suspension 30 milliLiter(s) Oral every 6 hours PRN  pantoprazole    Tablet 40 milliGRAM(s) Oral before breakfast  polyethylene glycol 3350 17 Gram(s) Oral two times a day PRN  senna 2 Tablet(s) Oral at bedtime      influenza   Vaccine 0.5 milliLiter(s) IntraMuscular once  sodium chloride 0.9%. 1000 milliLiter(s) IV Continuous <Continuous>      PAST MEDICAL/SURGICAL HISTORY  PAST MEDICAL & SURGICAL HISTORY:  Atrial tachycardia    Ventricular septal defect    History of orthostatic hypotension    Single kidney    S/p nephrectomy  donated kidney- left 1984        SOCIAL HISTORY: Substance Use (street drugs): ( x ) never used  (  ) other:    FAMILY HISTORY:  Family history of prostate cancer (Father)        REVIEW OF SYSTEMS:  CONSTITUTIONAL: No fever, weight loss, or fatigue  EYES: No eye pain, visual disturbances, or discharge  ENMT:  No difficulty hearing, tinnitus, vertigo; No sinus or throat pain  BREASTS: No pain, masses, or nipple discharge  GASTROINTESTINAL: No abdominal or epigastric pain. No nausea, vomiting, or hematemesis; No diarrhea or constipation. No melena or hematochezia.  GENITOURINARY: No dysuria, frequency, hematuria, or incontinence  NEUROLOGICAL: No headaches, memory loss, loss of strength, numbness, or tremors  ENDOCRINE: No heat or cold intolerance; No hair loss  MUSCULOSKELETAL: No joint pain or swelling; No muscle, back, or extremity pain  PSYCHIATRIC: No depression, anxiety, mood swings, or difficulty sleeping  HEME/LYMPH: No easy bruising, or bleeding gums  All others negative    PHYSICAL EXAM:  T(C): 36.3 (11-06-20 @ 05:40), Max: 36.7 (11-05-20 @ 11:46)  HR: 52 (11-06-20 @ 05:40) (52 - 66)  BP: 141/83 (11-06-20 @ 05:40) (122/61 - 149/85)  RR: 17 (11-06-20 @ 05:40) (17 - 17)  SpO2: 96% (11-06-20 @ 05:40) (96% - 98%)  Wt(kg): --  I&O's Summary    05 Nov 2020 07:01  -  06 Nov 2020 07:00  --------------------------------------------------------  IN: 200 mL / OUT: 0 mL / NET: 200 mL          GENERAL: NAD, well-groomed, well-developed  EYES: EOMI, PERRLA, conjunctiva and sclera clear  ENMT: No tonsillar erythema, exudates, or enlargement  Cardiovascular: Normal S1 S2, No JVD, No murmurs, No edema  Respiratory: Lungs diminished to auscultation	  Gastrointestinal:  Soft, Non-tender, + BS	  Extremities: Normal range of motion, No clubbing, cyanosis or edema  LYMPH: No lymphadenopathy noted  NERVOUS SYSTEM:  Alert & Oriented X3                                    11.4   7.33  )-----------( 235      ( 06 Nov 2020 06:25 )             36.8     11-06    140  |  105  |  26<H>  ----------------------------<  100<H>  4.7   |  22  |  1.04    Ca    8.7      06 Nov 2020 06:25  Phos  4.8     11-06  Mg     2.1     11-06    TPro  6.8  /  Alb  3.9  /  TBili  0.5  /  DBili  x   /  AST  43<H>  /  ALT  84<H>  /  AlkPhos  73  11-06    proBNP:   Lipid Profile:   HgA1c:   TSH:     Consultant(s) Notes Reviewed:  [x ] YES  [ ] NO    Care Discussed with Consultants/Other Providers [ x] YES  [ ] NO    Imaging Personally Reviewed independently:  [x] YES  [ ] NO    All labs, radiologic studies, vitals, orders and medications list reviewed. Patient is seen and examined at bedside. Case discussed with medical team.

## 2020-11-06 NOTE — PROGRESS NOTE ADULT - PROBLEM SELECTOR PLAN 3
-on abx; bacid tid   -abx were likely contributing to increase in lft's now changed and lfts improving  -ID input appreciated

## 2020-11-11 LAB
CARDIOLIPIN IGM SER-MCNC: 17.37 GPL — SIGNIFICANT CHANGE UP (ref 0–23)
CARDIOLIPIN IGM SER-MCNC: 6.91 MPL — SIGNIFICANT CHANGE UP (ref 0–11)

## 2020-11-30 ENCOUNTER — APPOINTMENT (OUTPATIENT)
Dept: ELECTROPHYSIOLOGY | Facility: CLINIC | Age: 65
End: 2020-11-30
Payer: COMMERCIAL

## 2020-11-30 ENCOUNTER — NON-APPOINTMENT (OUTPATIENT)
Age: 65
End: 2020-11-30

## 2020-11-30 VITALS
TEMPERATURE: 97.9 F | SYSTOLIC BLOOD PRESSURE: 111 MMHG | HEIGHT: 67 IN | BODY MASS INDEX: 31.71 KG/M2 | WEIGHT: 202 LBS | HEART RATE: 60 BPM | OXYGEN SATURATION: 95 % | DIASTOLIC BLOOD PRESSURE: 70 MMHG

## 2020-11-30 PROCEDURE — 99072 ADDL SUPL MATRL&STAF TM PHE: CPT

## 2020-11-30 PROCEDURE — 93000 ELECTROCARDIOGRAM COMPLETE: CPT

## 2020-11-30 PROCEDURE — 99215 OFFICE O/P EST HI 40 MIN: CPT

## 2020-11-30 RX ORDER — PANTOPRAZOLE 40 MG/1
40 TABLET, DELAYED RELEASE ORAL
Qty: 30 | Refills: 0 | Status: DISCONTINUED | COMMUNITY
Start: 2020-11-06

## 2020-11-30 RX ORDER — PRIMIDONE 50 MG/1
50 TABLET ORAL
Qty: 30 | Refills: 0 | Status: DISCONTINUED | COMMUNITY
Start: 2020-08-11

## 2020-11-30 RX ORDER — CEFUROXIME AXETIL 250 MG/1
250 TABLET ORAL
Qty: 7 | Refills: 0 | Status: DISCONTINUED | COMMUNITY
Start: 2020-11-06

## 2020-11-30 NOTE — DISCUSSION/SUMMARY
[FreeTextEntry1] : Oscar Jasso is a 64y/o woman with Hx of positive tilt table test/orthostatic hypotension, non obstructive CAD, renal donor s/p nephrectomy, VSD, and recurring palpitations with attempted AT ablation (not performed- 2015) and recent hospitalization for PE who presents today for routine f/u. \par \par Impression:\par \par 1. Paroxysmal AT: EKG today NSR. Review of recent Holter reveals frequent runs of AT. Given highly symptomatic and unable to tolerate increased beta blocker dosing, recommend undergoing EP study and AT ablation. Risks, benefits, and alternatives to procedure discussed at length. Risks including that of bleeding, infection, stroke, and cardiac tamponade discussed and he verbalizes understanding of all.\par \par 2. Orthostatic hypotension: Instructed on safety mechanisms such as staying well hydrated, utilizing salt, avoiding prolonged standing with knees locked, and to lie down with feet elevated if symptoms of near syncope occur. Can also consider use of compression socks and should avoid known triggers for syncope such as alcohol and warm or crowded environments. Move slowly during positional changes. \par \par 3. PE: f/u with Hematology and resume Xarelto as prescribed. \par \par Plan for AT ablation.

## 2020-11-30 NOTE — HISTORY OF PRESENT ILLNESS
[FreeTextEntry1] : Apurva Nava MD\par \par Oscar Jasso is a 64y/o woman with Hx of positive tilt table test/orthostatic hypotension, non obstructive CAD, renal donor s/p nephrectomy, VSD, and recurring palpitations with attempted AT ablation (not performed- 2015) and recent hospitalization for PE who presents today for routine f/u. Was scheduled for AT ablation and noted to be dyspneic at PSTs. Admitted to the hospital and was found to have a PE. Now on AC, Xarelto 20mg daily. Remains on metoprolol. In 2015, underwent EP study which revealed runs of AT, unable to be sustained long enough to map or perform ablation. Still having palpitations which come and go spontaneously, moderate in intensity, lasting up to several minutes in duration. No known relieving factors. Unable to titrate up on metoprolol secondary to orthostasis. Denies chest pain, SOB, syncope or near syncope.

## 2020-11-30 NOTE — PHYSICAL EXAM
[General Appearance - Well Developed] : well developed [Normal Appearance] : normal appearance [Well Groomed] : well groomed [General Appearance - Well Nourished] : well nourished [No Deformities] : no deformities [General Appearance - In No Acute Distress] : no acute distress [Normal Conjunctiva] : the conjunctiva exhibited no abnormalities [Eyelids - No Xanthelasma] : the eyelids demonstrated no xanthelasmas [Normal Oral Mucosa] : normal oral mucosa [No Oral Pallor] : no oral pallor [No Oral Cyanosis] : no oral cyanosis [Normal Jugular Venous A Waves Present] : normal jugular venous A waves present [Normal Jugular Venous V Waves Present] : normal jugular venous V waves present [No Jugular Venous Bhakta A Waves] : no jugular venous bhakta A waves [Respiration, Rhythm And Depth] : normal respiratory rhythm and effort [Exaggerated Use Of Accessory Muscles For Inspiration] : no accessory muscle use [Auscultation Breath Sounds / Voice Sounds] : lungs were clear to auscultation bilaterally [Heart Rate And Rhythm] : heart rate and rhythm were normal [Heart Sounds] : normal S1 and S2 [Murmurs] : no murmurs present [Abdomen Soft] : soft [Abdomen Tenderness] : non-tender [Abdomen Mass (___ Cm)] : no abdominal mass palpated [Abnormal Walk] : normal gait [Gait - Sufficient For Exercise Testing] : the gait was sufficient for exercise testing [Nail Clubbing] : no clubbing of the fingernails [Cyanosis, Localized] : no localized cyanosis [Petechial Hemorrhages (___cm)] : no petechial hemorrhages [Skin Color & Pigmentation] : normal skin color and pigmentation [] : no rash [No Venous Stasis] : no venous stasis [Skin Lesions] : no skin lesions [No Skin Ulcers] : no skin ulcer [No Xanthoma] : no  xanthoma was observed [Oriented To Time, Place, And Person] : oriented to person, place, and time [Affect] : the affect was normal [Mood] : the mood was normal [No Anxiety] : not feeling anxious

## 2020-12-04 ENCOUNTER — OUTPATIENT (OUTPATIENT)
Dept: OUTPATIENT SERVICES | Facility: HOSPITAL | Age: 65
LOS: 1 days | Discharge: HOME | End: 2020-12-04

## 2020-12-04 DIAGNOSIS — I80.9 PHLEBITIS AND THROMBOPHLEBITIS OF UNSPECIFIED SITE: ICD-10-CM

## 2020-12-04 DIAGNOSIS — I26.99 OTHER PULMONARY EMBOLISM WITHOUT ACUTE COR PULMONALE: ICD-10-CM

## 2020-12-04 DIAGNOSIS — Z90.5 ACQUIRED ABSENCE OF KIDNEY: Chronic | ICD-10-CM

## 2020-12-08 ENCOUNTER — LABORATORY RESULT (OUTPATIENT)
Age: 65
End: 2020-12-08

## 2020-12-08 ENCOUNTER — APPOINTMENT (OUTPATIENT)
Dept: HEMATOLOGY ONCOLOGY | Facility: CLINIC | Age: 65
End: 2020-12-08
Payer: COMMERCIAL

## 2020-12-08 ENCOUNTER — RESULT REVIEW (OUTPATIENT)
Age: 65
End: 2020-12-08

## 2020-12-08 VITALS
OXYGEN SATURATION: 98 % | HEIGHT: 67.83 IN | DIASTOLIC BLOOD PRESSURE: 82 MMHG | WEIGHT: 200.62 LBS | HEART RATE: 88 BPM | BODY MASS INDEX: 30.76 KG/M2 | TEMPERATURE: 97.4 F | SYSTOLIC BLOOD PRESSURE: 164 MMHG | RESPIRATION RATE: 17 BRPM

## 2020-12-08 DIAGNOSIS — I82.409 ACUTE EMBOLISM AND THROMBOSIS OF UNSPECIFIED DEEP VEINS OF UNSPECIFIED LOWER EXTREMITY: ICD-10-CM

## 2020-12-08 DIAGNOSIS — Z72.89 OTHER PROBLEMS RELATED TO LIFESTYLE: ICD-10-CM

## 2020-12-08 DIAGNOSIS — Z82.49 FAMILY HISTORY OF ISCHEMIC HEART DISEASE AND OTHER DISEASES OF THE CIRCULATORY SYSTEM: ICD-10-CM

## 2020-12-08 DIAGNOSIS — Z80.0 FAMILY HISTORY OF MALIGNANT NEOPLASM OF DIGESTIVE ORGANS: ICD-10-CM

## 2020-12-08 DIAGNOSIS — Z80.42 FAMILY HISTORY OF MALIGNANT NEOPLASM OF PROSTATE: ICD-10-CM

## 2020-12-08 DIAGNOSIS — R06.02 SHORTNESS OF BREATH: ICD-10-CM

## 2020-12-08 LAB
APTT BLD: 39.9 SEC
BASOPHILS # BLD AUTO: 0.02 K/UL — SIGNIFICANT CHANGE UP (ref 0–0.2)
BASOPHILS NFR BLD AUTO: 0.2 % — SIGNIFICANT CHANGE UP (ref 0–2)
EOSINOPHIL # BLD AUTO: 0.03 K/UL — SIGNIFICANT CHANGE UP (ref 0–0.5)
EOSINOPHIL NFR BLD AUTO: 0.3 % — SIGNIFICANT CHANGE UP (ref 0–6)
HCT VFR BLD CALC: 37.5 % — SIGNIFICANT CHANGE UP (ref 34.5–45)
HGB BLD-MCNC: 11.8 G/DL — SIGNIFICANT CHANGE UP (ref 11.5–15.5)
IMM GRANULOCYTES NFR BLD AUTO: 0.3 % — SIGNIFICANT CHANGE UP (ref 0–1.5)
LUPUS ANTICOAGULANT CASCADE REFLEX: NORMAL
LYMPHOCYTES # BLD AUTO: 2.74 K/UL — SIGNIFICANT CHANGE UP (ref 1–3.3)
LYMPHOCYTES # BLD AUTO: 31.6 % — SIGNIFICANT CHANGE UP (ref 13–44)
MCHC RBC-ENTMCNC: 27.6 PG — SIGNIFICANT CHANGE UP (ref 27–34)
MCHC RBC-ENTMCNC: 31.5 G/DL — LOW (ref 32–36)
MCV RBC AUTO: 87.8 FL — SIGNIFICANT CHANGE UP (ref 80–100)
MONOCYTES # BLD AUTO: 0.4 K/UL — SIGNIFICANT CHANGE UP (ref 0–0.9)
MONOCYTES NFR BLD AUTO: 4.6 % — SIGNIFICANT CHANGE UP (ref 2–14)
NEUTROPHILS # BLD AUTO: 5.46 K/UL — SIGNIFICANT CHANGE UP (ref 1.8–7.4)
NEUTROPHILS NFR BLD AUTO: 63 % — SIGNIFICANT CHANGE UP (ref 43–77)
NRBC # BLD: 0 /100 WBCS — SIGNIFICANT CHANGE UP (ref 0–0)
PLATELET # BLD AUTO: 177 K/UL — SIGNIFICANT CHANGE UP (ref 150–400)
RBC # BLD: 4.27 M/UL — SIGNIFICANT CHANGE UP (ref 3.8–5.2)
RBC # FLD: 14 % — SIGNIFICANT CHANGE UP (ref 10.3–14.5)
WBC # BLD: 8.68 K/UL — SIGNIFICANT CHANGE UP (ref 3.8–10.5)
WBC # FLD AUTO: 8.68 K/UL — SIGNIFICANT CHANGE UP (ref 3.8–10.5)

## 2020-12-08 PROCEDURE — 99214 OFFICE O/P EST MOD 30 MIN: CPT

## 2020-12-08 PROCEDURE — 99072 ADDL SUPL MATRL&STAF TM PHE: CPT

## 2020-12-08 NOTE — REVIEW OF SYSTEMS
[Negative] : Allergic/Immunologic [Fatigue] : fatigue [SOB on Exertion] : shortness of breath during exertion [Joint Pain] : joint pain [FreeTextEntry9] : low back pain.

## 2020-12-08 NOTE — HISTORY OF PRESENT ILLNESS
[2 - Distress Level] : Distress Level: 2 [de-identified] : Ms Jasso is a 65 year old female with PMHx AT s/p failed ablation (not performed in 2015), positive tilt table/orthostatic hypotension, VSD, non-obstructive CAD, PVCs, chronic palpitations, renal donor (s/p L nephrectomy for son) admitted with palpitations rom PST, and SOB at rest.\par -CTA chest with extensive pulmonary embolism. Suggestion right heart strain. Right upper lobe 6 mm nodule is indeterminate.\par LE duplex Acute, non-occlusive deep vein thromboses noted in the right peroneal and posterior tibial veins.\par \par -S/p hep gtt. Started on Xarelto \par -echo with Dilated RV and Decreased RV function, RV normal on MRI on 9/23/2020 \par \par Patient complaints of low back pain, not taking pain meds, pain likely immobility. Denies fevers, night sweats or weight loss. \par \par Referred for hypercoagulable work up.  [FreeTextEntry7] : Back pain ( see note).

## 2020-12-10 LAB
ALBUMIN SERPL ELPH-MCNC: 4.1 G/DL
ALP BLD-CCNC: 80 U/L
ALT SERPL-CCNC: 19 U/L
ANION GAP SERPL CALC-SCNC: 12 MMOL/L
AST SERPL-CCNC: 20 U/L
AT III PPP CHRO-ACNC: 123 %
B2 GLYCOPROT1 AB SER QL: POSITIVE
BILIRUB SERPL-MCNC: 0.7 MG/DL
BUN SERPL-MCNC: 31 MG/DL
CALCIUM SERPL-MCNC: 9.3 MG/DL
CARDIOLIPIN AB SER IA-ACNC: NEGATIVE
CHLORIDE SERPL-SCNC: 104 MMOL/L
CO2 SERPL-SCNC: 24 MMOL/L
CREAT SERPL-MCNC: 1.15 MG/DL
GLUCOSE SERPL-MCNC: 100 MG/DL
INR PPP: 2.19 RATIO
POTASSIUM SERPL-SCNC: 4.2 MMOL/L
PROT C PPP CHRO-ACNC: 88 %
PROT S FREE PPP-ACNC: 126 %
PROT SERPL-MCNC: 7.4 G/DL
PT BLD: 24.9 SEC
SODIUM SERPL-SCNC: 140 MMOL/L

## 2020-12-11 LAB
APCR PPP: 3.61 RATIO
FVL BLANK: 41.3
FVL TEST: 148.9

## 2020-12-14 ENCOUNTER — APPOINTMENT (OUTPATIENT)
Dept: ELECTROPHYSIOLOGY | Facility: CLINIC | Age: 65
End: 2020-12-14

## 2020-12-14 LAB
DNA PLOIDY SPEC FC-IMP: NORMAL
PTR INTERP: NORMAL

## 2020-12-15 NOTE — H&P PST ADULT - PAIN SCALE PREFERRED, PROFILE
numerical 0-10

## 2020-12-31 ENCOUNTER — OUTPATIENT (OUTPATIENT)
Dept: OUTPATIENT SERVICES | Facility: HOSPITAL | Age: 65
LOS: 1 days | End: 2020-12-31

## 2020-12-31 VITALS
OXYGEN SATURATION: 98 % | RESPIRATION RATE: 16 BRPM | SYSTOLIC BLOOD PRESSURE: 108 MMHG | HEIGHT: 68 IN | WEIGHT: 203.93 LBS | HEART RATE: 66 BPM | TEMPERATURE: 98 F | DIASTOLIC BLOOD PRESSURE: 68 MMHG

## 2020-12-31 DIAGNOSIS — I47.2 VENTRICULAR TACHYCARDIA: ICD-10-CM

## 2020-12-31 DIAGNOSIS — I47.1 SUPRAVENTRICULAR TACHYCARDIA: ICD-10-CM

## 2020-12-31 DIAGNOSIS — I26.99 OTHER PULMONARY EMBOLISM WITHOUT ACUTE COR PULMONALE: ICD-10-CM

## 2020-12-31 DIAGNOSIS — Z90.5 ACQUIRED ABSENCE OF KIDNEY: Chronic | ICD-10-CM

## 2020-12-31 LAB
ALBUMIN SERPL ELPH-MCNC: 4.3 G/DL — SIGNIFICANT CHANGE UP (ref 3.3–5)
ALP SERPL-CCNC: 72 U/L — SIGNIFICANT CHANGE UP (ref 40–120)
ALT FLD-CCNC: 15 U/L — SIGNIFICANT CHANGE UP (ref 4–33)
ANION GAP SERPL CALC-SCNC: 10 MMOL/L — SIGNIFICANT CHANGE UP (ref 7–14)
AST SERPL-CCNC: 16 U/L — SIGNIFICANT CHANGE UP (ref 4–32)
BILIRUB SERPL-MCNC: 0.5 MG/DL — SIGNIFICANT CHANGE UP (ref 0.2–1.2)
BLD GP AB SCN SERPL QL: NEGATIVE — SIGNIFICANT CHANGE UP
BUN SERPL-MCNC: 34 MG/DL — HIGH (ref 7–23)
CALCIUM SERPL-MCNC: 9.2 MG/DL — SIGNIFICANT CHANGE UP (ref 8.4–10.5)
CHLORIDE SERPL-SCNC: 104 MMOL/L — SIGNIFICANT CHANGE UP (ref 98–107)
CO2 SERPL-SCNC: 25 MMOL/L — SIGNIFICANT CHANGE UP (ref 22–31)
CREAT SERPL-MCNC: 1.12 MG/DL — SIGNIFICANT CHANGE UP (ref 0.5–1.3)
GLUCOSE SERPL-MCNC: 87 MG/DL — SIGNIFICANT CHANGE UP (ref 70–99)
HCT VFR BLD CALC: 38 % — SIGNIFICANT CHANGE UP (ref 34.5–45)
HGB BLD-MCNC: 11.8 G/DL — SIGNIFICANT CHANGE UP (ref 11.5–15.5)
MCHC RBC-ENTMCNC: 27.2 PG — SIGNIFICANT CHANGE UP (ref 27–34)
MCHC RBC-ENTMCNC: 31.1 GM/DL — LOW (ref 32–36)
MCV RBC AUTO: 87.6 FL — SIGNIFICANT CHANGE UP (ref 80–100)
NRBC # BLD: 0 /100 WBCS — SIGNIFICANT CHANGE UP
NRBC # FLD: 0 K/UL — SIGNIFICANT CHANGE UP
PLATELET # BLD AUTO: 182 K/UL — SIGNIFICANT CHANGE UP (ref 150–400)
POTASSIUM SERPL-MCNC: 4.2 MMOL/L — SIGNIFICANT CHANGE UP (ref 3.5–5.3)
POTASSIUM SERPL-SCNC: 4.2 MMOL/L — SIGNIFICANT CHANGE UP (ref 3.5–5.3)
PROT SERPL-MCNC: 7.5 G/DL — SIGNIFICANT CHANGE UP (ref 6–8.3)
RBC # BLD: 4.34 M/UL — SIGNIFICANT CHANGE UP (ref 3.8–5.2)
RBC # FLD: 14.1 % — SIGNIFICANT CHANGE UP (ref 10.3–14.5)
RH IG SCN BLD-IMP: POSITIVE — SIGNIFICANT CHANGE UP
SODIUM SERPL-SCNC: 139 MMOL/L — SIGNIFICANT CHANGE UP (ref 135–145)
WBC # BLD: 8.95 K/UL — SIGNIFICANT CHANGE UP (ref 3.8–10.5)
WBC # FLD AUTO: 8.95 K/UL — SIGNIFICANT CHANGE UP (ref 3.8–10.5)

## 2020-12-31 NOTE — H&P PST ADULT - NSICDXPASTMEDICALHX_GEN_ALL_CORE_FT
PAST MEDICAL HISTORY:  Atrial tachycardia     History of orthostatic hypotension     Nonobstructive atherosclerosis of coronary artery     Pulmonary embolism     Single kidney     Supraventricular tachycardia     Ventricular septal defect      PAST MEDICAL HISTORY:  Atrial tachycardia     DVT, lower extremity     History of orthostatic hypotension     Nonobstructive atherosclerosis of coronary artery     Pulmonary embolism     Single kidney     Supraventricular tachycardia     Ventricular septal defect

## 2020-12-31 NOTE — H&P PST ADULT - NEGATIVE NEUROLOGICAL SYMPTOMS
no transient paralysis/no weakness/no paresthesias/no generalized seizures/no vertigo/no difficulty walking/no headache no transient paralysis/no weakness/no paresthesias/no generalized seizures/no syncope/no vertigo/no difficulty walking/no headache

## 2020-12-31 NOTE — H&P PST ADULT - GASTROINTESTINAL DETAILS
soft/nontender/no distention/no masses palpable/bowel sounds normal/no bruit/no rebound tenderness/no guarding/no rigidity/no organomegaly soft/nontender/no distention/no masses palpable/bowel sounds normal

## 2020-12-31 NOTE — H&P PST ADULT - NSICDXPROBLEM_GEN_ALL_CORE_FT
PROBLEM DIAGNOSES  Problem: Supraventricular tachycardia  Assessment and Plan: preop for complaex ablation on 1/12/21  pt awaiting updated preop instructions from Dr. Quijano's office (recently admitted for PE and currently on eliquis). Pt also waiting instructions regarding metoprolol use prior to ablation.  pt is scheduled for COVID testing preop         PROBLEM DIAGNOSES  Problem: Supraventricular tachycardia  Assessment and Plan: preop for complaex ablation on 1/12/21  pt awaiting updated preop instructions from Dr. Quijano's office (recently admitted for PE and currently on Xarelto). Pt also waiting instructions regarding metoprolol use prior to ablation.  pt is scheduled for COVID testing preop      Problem: Pulmonary thromboembolism  Assessment and Plan: last hematology visit note on chart        PROBLEM DIAGNOSES  Problem: Pulmonary thromboembolism  Assessment and Plan: last hematology visit note on chart     Problem: Supraventricular tachycardia  Assessment and Plan: preop for complaex ablation on 1/12/21  pt awaiting updated preop instructions from Dr. Quijano's office (recently admitted for DVT/PE and currently on Xarelto). Pt also waiting instructions regarding metoprolol use prior to ablation.  pt is scheduled for COVID testing preop

## 2020-12-31 NOTE — H&P PST ADULT - VTE RISK COMMENTS
pt recently diagnosed with PE and started on Xarelto pt recently diagnosed with DVT/PE and started on Xarelto

## 2020-12-31 NOTE — H&P PST ADULT - RS GEN PE MLT RESP DETAILS PC
breath sounds equal/good air movement/clear to auscultation bilaterally/no chest wall tenderness/no intercostal retractions/no rales/no rhonchi/no wheezes breath sounds equal/good air movement/respirations non-labored/clear to auscultation bilaterally/no chest wall tenderness/no intercostal retractions/no rales/no rhonchi/no wheezes

## 2020-12-31 NOTE — H&P PST ADULT - HISTORY OF PRESENT ILLNESS
64 y/o female with Orthostatic Hypotension, non obstructive CAD, VSD, renal donor and recurring palpitations (s/p attempted AT ablation in 2015) presents to PST preop for complex ablation on 1/12/21.  pt reports SOB and fatigue from her recurrent palpitations. she reports some relief with metoprolol.   pt was scheduled for ablation in november but had severe SOB and was subsequently diagnosed with PE. she is currently on Xarelto. 66 y/o female with Orthostatic Hypotension, non obstructive CAD, VSD, renal donor and recurring palpitations (s/p attempted AT ablation in 2015) presents to PST preop for complex ablation on 1/12/21.  pt reports SOB and fatigue from her recurrent palpitations. she reports some relief with metoprolol.   pt was scheduled for ablation in november but had severe SOB and was subsequently diagnosed with DVT/PE. she is currently on Xarelto.

## 2021-01-09 DIAGNOSIS — Z01.818 ENCOUNTER FOR OTHER PREPROCEDURAL EXAMINATION: ICD-10-CM

## 2021-01-10 ENCOUNTER — APPOINTMENT (OUTPATIENT)
Dept: DISASTER EMERGENCY | Facility: CLINIC | Age: 66
End: 2021-01-10

## 2021-01-12 ENCOUNTER — OUTPATIENT (OUTPATIENT)
Dept: OUTPATIENT SERVICES | Facility: HOSPITAL | Age: 66
LOS: 1 days | Discharge: HOME | End: 2021-01-12
Payer: COMMERCIAL

## 2021-01-12 DIAGNOSIS — Z90.5 ACQUIRED ABSENCE OF KIDNEY: Chronic | ICD-10-CM

## 2021-01-12 DIAGNOSIS — I47.2 VENTRICULAR TACHYCARDIA: ICD-10-CM

## 2021-01-12 LAB
BLD GP AB SCN SERPL QL: NEGATIVE — SIGNIFICANT CHANGE UP
RH IG SCN BLD-IMP: POSITIVE — SIGNIFICANT CHANGE UP
SARS-COV-2 N GENE NPH QL NAA+PROBE: NOT DETECTED

## 2021-01-12 PROCEDURE — 93010 ELECTROCARDIOGRAM REPORT: CPT | Mod: 76

## 2021-01-12 PROCEDURE — 93613 INTRACARDIAC EPHYS 3D MAPG: CPT

## 2021-01-12 PROCEDURE — 93653 COMPRE EP EVAL TX SVT: CPT

## 2021-01-12 PROCEDURE — 93662 INTRACARDIAC ECG (ICE): CPT | Mod: 26

## 2021-01-12 RX ORDER — SODIUM CHLORIDE 9 MG/ML
3 INJECTION INTRAMUSCULAR; INTRAVENOUS; SUBCUTANEOUS EVERY 8 HOURS
Refills: 0 | Status: DISCONTINUED | OUTPATIENT
Start: 2021-01-12 | End: 2021-01-26

## 2021-01-12 RX ORDER — ACETAMINOPHEN 500 MG
1000 TABLET ORAL ONCE
Refills: 0 | Status: DISCONTINUED | OUTPATIENT
Start: 2021-01-12 | End: 2021-01-26

## 2021-01-12 RX ORDER — FENTANYL CITRATE 50 UG/ML
25 INJECTION INTRAVENOUS
Refills: 0 | Status: DISCONTINUED | OUTPATIENT
Start: 2021-01-12 | End: 2021-01-12

## 2021-01-12 RX ORDER — PANTOPRAZOLE SODIUM 20 MG/1
1 TABLET, DELAYED RELEASE ORAL
Qty: 30 | Refills: 0
Start: 2021-01-12 | End: 2021-02-10

## 2021-01-12 RX ORDER — ONDANSETRON 8 MG/1
4 TABLET, FILM COATED ORAL ONCE
Refills: 0 | Status: DISCONTINUED | OUTPATIENT
Start: 2021-01-12 | End: 2021-01-26

## 2021-01-12 RX ORDER — METOPROLOL TARTRATE 50 MG
1 TABLET ORAL
Qty: 0 | Refills: 0 | DISCHARGE

## 2021-01-12 RX ORDER — RIVAROXABAN 15 MG-20MG
1 KIT ORAL
Qty: 0 | Refills: 0 | DISCHARGE

## 2021-01-12 RX ORDER — MEPERIDINE HYDROCHLORIDE 50 MG/ML
6.25 INJECTION INTRAMUSCULAR; INTRAVENOUS; SUBCUTANEOUS ONCE
Refills: 0 | Status: DISCONTINUED | OUTPATIENT
Start: 2021-01-12 | End: 2021-01-12

## 2021-01-12 RX ADMIN — MEPERIDINE HYDROCHLORIDE 6.25 MILLIGRAM(S): 50 INJECTION INTRAMUSCULAR; INTRAVENOUS; SUBCUTANEOUS at 13:58

## 2021-01-12 RX ADMIN — SODIUM CHLORIDE 3 MILLILITER(S): 9 INJECTION INTRAMUSCULAR; INTRAVENOUS; SUBCUTANEOUS at 14:32

## 2021-01-12 NOTE — CONSULT NOTE ADULT - ASSESSMENT
Assessment and Plan    66 y/o female with positive tilt table/orthostatic hypotension, non obstructive CAD, VSD, renal donor and recurring palpitations (s/p attempted AT ablation in 2015), PE on xarelto presents for AT ablation    1. AT  -s/p ablation  -f/u EP    2. PE  -on Xarelto   -echo from previous admission with Dilated RV and Decreased RV function

## 2021-01-12 NOTE — CONSULT NOTE ADULT - SUBJECTIVE AND OBJECTIVE BOX
Syed Mckay MD  Interventional Cardiology / Endovascular Specialist  [place] Office : [address] [place] [address] [place] [address]  Tel:   [place] Office : [address] [facility] [place] [address]  Tel: [phone]  Cell : [phone]    HISTORY OF PRESENTING ILLNESS:  64 y/o female with positive tilt table/orthostatic hypotension, non obstructive CAD, VSD, renal donor and recurring palpitations (s/p attempted AT ablation in 2015), PE on xarelto presents for AT ablation  	  MEDICATIONS:        acetaminophen  IVPB .. 1000 milliGRAM(s) IV Intermittent once PRN  [id number]    Injectable 25 MICROGram(s) IV Push every 5 minutes PRN  ondansetron Injectable 4 milliGRAM(s) IV Push once PRN        sodium chloride 0.9% lock flush 3 milliLiter(s) IV Push every 8 hours      PAST MEDICAL/SURGICAL HISTORY  PAST MEDICAL & SURGICAL HISTORY:  DVT, lower extremity    Nonobstructive atherosclerosis of coronary artery    Supraventricular tachycardia    Pulmonary embolism    Atrial tachycardia    Ventricular septal defect    History of orthostatic hypotension    Single kidney    S/p nephrectomy  donated kidney- left 1984        SOCIAL HISTORY: Substance Use (street drugs): ( x ) never used  (  ) other:    FAMILY HISTORY:  Family history of prostate cancer (Father)        REVIEW OF SYSTEMS:  CONSTITUTIONAL: No fever, weight loss, or fatigue  EYES: No eye pain, visual disturbances, or discharge  ENMT:  No difficulty hearing, tinnitus, vertigo; No sinus or throat pain  BREASTS: No pain, masses, or nipple discharge  GASTROINTESTINAL: No abdominal or epigastric pain. No nausea, vomiting, or hematemesis; No diarrhea or constipation. No melena or hematochezia.  GENITOURINARY: No dysuria, frequency, hematuria, or incontinence  NEUROLOGICAL: No headaches, memory loss, loss of strength, numbness, or tremors  ENDOCRINE: No heat or cold intolerance; No hair loss  MUSCULOSKELETAL: No joint pain or swelling; No muscle, back, or extremity pain  PSYCHIATRIC: No depression, anxiety, mood swings, or difficulty sleeping  HEME/LYMPH: No easy bruising, or bleeding gums  All others negative    PHYSICAL EXAM:  T(C): --  HR: --  BP: --  RR: --  SpO2: --  Wt(kg): --  I&O's Summary    Height (cm): 170.2 (01-12 @ 06:43)  Weight (kg): 92.079 (01-12 @ 06:43)  BMI (kg/m2): 31.8 (01-12 @ 06:43)  BSA (m2): 2.04 (01-12 @ 06:43)    GENERAL: NAD  EYES: EOMI, PERRLA, conjunctiva and sclera clear  ENMT: No tonsillar erythema, exudates, or enlargement  Cardiovascular: Normal S1 S2, No JVD, No murmurs, No edema  Respiratory: Lungs clear to auscultation	  Gastrointestinal:  Soft, Non-tender, + BS	  Extremities: No edema                        proBNP:   Lipid Profile:   HgA1c:   TSH:     Consultant(s) Notes Reviewed:  [x ] YES  [ ] NO    Care Discussed with Consultants/Other Providers [ x] YES  [ ] NO    Imaging Personally Reviewed independently:  [x] YES  [ ] NO    All labs, radiologic studies, vitals, orders and medications list reviewed. Patient is seen and examined at bedside. Case discussed with medical team.

## 2021-01-12 NOTE — CHART NOTE - NSCHARTNOTEFT_GEN_A_CORE
The patient presents today for elective atrial tachycardia ablation  See H&P from presurgical testing.   The patient denies any new complaints since the last time she was seen by Dr. Quijano.  Medications reviewed. Last dose Xarelto was morning dose on 1/11 and metoprolol evening dose on 1/10
Type of procedure: PVI  Licensed independent practitioner: Samson Quijano MD  Assistant: None  Description of procedure: After informed consent was obtained, the patient was brought to the Electrophysiology laboratory in the fasting state, and was prepped and draped in the usual sterile fashion. The patient was electively intubated by members of the anesthesia department, who provided sedation throughout the case. In addition an esophageal temperature probe was placed in the esophagus to allow dynamic temperate monitoring during ablation. The patient was under uninterrupted xarelto therapy.  Sheaths were placed as described in the procedure report, and catheters were advanced into the heart under fluoroscopic guidance without complications. Baseline intra-cardiac echocardiography (ICE) demonstrated the following: The LV size and functions were normal. There was no pericardial effusion at baseline  IV Heparin bolus was given followed by continuous drip to maintain the -400s throughout the case.  The left atrium was entered under fluoroscopic and ICE guidance by a single transseptal approach using a medium curve Agilis sheath.  Entry of the LA was verified by pressure measurements (LA pressure 15 mmHg). There were no complications.  A PentaRaGetFeedback Manoj F curve catheter and a 3.5 mm irrigated-tip Navistar ThermoCool D/F-curve ablation catheter were used for mapping and ablation. A 3D anatomy of the LA was performed using Carto 3 V7. There were no areas of low voltage in the LA.  We first isolated the left pulmonary veins in an antral approach with demonstration of entrance block. No emerson lesions were required for isolation of the left PVs. We then similarly isolated the right pulmonary veins in an antral approach. Lesions were required in the septal emerson near the RIPV, as well as in the posterior emerson, near the RSPV, for isolation. Bilateral PVs showed evidence of entrance and exit block after pacing maneuvers. With pacing inside the PV, there was local PV capture without capture of the LA in the LSPV, LIPV and RSPV. Esophageal temperatures damián from a baseline of 35.8 degrees Celsius to a peak of 36.8 degrees Celsius.  The pulmonary veins remained isolated for >30 minutes, without evidence of acute reconnection. Burst pacing was performed from the proximal CS bipole down to 200ms with induction of atrial fibrillation. Posterior wall isolation resulted in conversion to sinus rhythm.  Next a CTI ablation was performed with evidence of bidirectional block.  As such, heparin was stopped and the sheaths were pulled back to the right atrium. Repeat ICE imaging revealed no pericardial effusion.  All catheters were removed from the body and sheaths were left in place for removal once ACT declines to below 200 seconds. The final left atrial pressure was 20 mmHg.  A total of 70mg of protamine was given to reverse the Heparin effect.    The patient tolerated the procedure well and was successfully extubated and transferred to the [facility] for further monitoring. There were no complications.  During the procedure, a [employer] rep was present to manage a complex mapping system.    Findings of procedure: Successful isolation of the pulmonary veins in an antral circumferential approach. Posterior wall isolation and CTI ablation with bidirectional block. No acute reconnections.  Estimated blood loss: <5cc  Specimen removed: N/A  Preoperative Dx: Afib  Postoperative Dx: Afib  Complications: None  Anesthesia type: General

## 2021-01-30 NOTE — H&P PST ADULT - TEACHING/LEARNING FACTORS IMPACT ABILITY TO LEARN
I have reviewed discharge instructions with the patient. The patient verbalized understanding. Patient left ED via Discharge Method: ambulatory to Home with self. Opportunity for questions and clarification provided. Patient given 4 scripts. To continue your aftercare when you leave the hospital, you may receive an automated call from our care team to check in on how you are doing. This is a free service and part of our promise to provide the best care and service to meet your aftercare needs.  If you have questions, or wish to unsubscribe from this service please call [phone]. Thank you for Choosing our [facility] Emergency Department.
Report to TALA Aguillon. Care transferred at this time.
none

## 2021-02-16 ENCOUNTER — NON-APPOINTMENT (OUTPATIENT)
Age: 66
End: 2021-02-16

## 2021-02-18 ENCOUNTER — APPOINTMENT (OUTPATIENT)
Dept: ELECTROPHYSIOLOGY | Facility: CLINIC | Age: 66
End: 2021-02-18
Payer: COMMERCIAL

## 2021-02-18 DIAGNOSIS — I26.99 OTHER PULMONARY EMBOLISM W/OUT ACUTE COR PULMONALE: ICD-10-CM

## 2021-02-18 PROCEDURE — 99215 OFFICE O/P EST HI 40 MIN: CPT | Mod: 95

## 2021-02-18 NOTE — HISTORY OF PRESENT ILLNESS
[FreeTextEntry1] : Apurva Nava MD\par \par Oscar Jasso is a 64y/o woman with Hx of positive tilt table test/orthostatic hypotension, non obstructive CAD, renal donor s/p nephrectomy, VSD, and recurring palpitations with attempted AT ablation (not performed- 2015) and now s/p PVI, PWI, and CTI ablation on 1/21/2021 who presents today for routine f/u. Admits doing well post ablation with no issues or complaints. No longer feeling palpitations or recurrent tachyarrhythmias. Denies chest pain, palpitations, SOB, syncope or near syncope. Right groin without pain, swelling, or bruising. \par \par Prior to ablation, she had a recent hospitalization for PE. She was previously for a AT ablation but was noted to be dyspneic during PSTs and sent to the ER where she was admitted with newly found PE. Now on AC, Xarelto 20mg daily. Remains on metoprolol. In 2015, underwent EP study which revealed runs of AT, unable to be sustained long enough to map or perform ablation.

## 2021-02-18 NOTE — PHYSICAL EXAM
[General Appearance - Well Developed] : well developed [Normal Appearance] : normal appearance [Well Groomed] : well groomed [General Appearance - Well Nourished] : well nourished [No Deformities] : no deformities [General Appearance - In No Acute Distress] : no acute distress [FreeTextEntry1] : Visit performed via TELE HEALTH, unable to complete physical exam

## 2021-02-18 NOTE — DISCUSSION/SUMMARY
[Home] : at home, [unfilled] , at the time of the visit. [Medical Office: ([facility])___] : at the medical office located in  [FreeTextEntry1] : Oscar Jasso is a 64y/o woman with Hx of positive tilt table test/orthostatic hypotension, non obstructive CAD, renal donor s/p nephrectomy, VSD, and recurring palpitations with attempted AT ablation (not performed- 2015) and now s/p PVI, PWI, and CTI ablation on 1/21/2021 who presents today for routine f/u.\par \par Impression:\par \par 1. Paroxysmal AT: s/p PVI, PWI, and CTI ablation on 1/21/2021. No EKG performed today. Feeling well without recurrent palpitations. Right groin without pain, swelling, or bruising. Will resume metoprolol and Xarelto as prescribed. \par \par 2. Orthostatic hypotension: No recent syncope or near syncope and no dizziness. Instructed on safety mechanisms such as staying well hydrated, utilizing salt, avoiding prolonged standing with knees locked, and to lie down with feet elevated if symptoms of near syncope occur. Can also consider use of compression socks and should avoid known triggers for syncope such as alcohol and warm or crowded environments. Move slowly during positional changes. \par \par 3. PE: reviewed hospital records including: CTA chest with extensive pulmonary embolism. Suggestion right heart strain. Right upper lobe 6 mm nodule is indeterminate. LE duplex with  Acute, non-occlusive deep vein thromboses noted in the right peroneal and posterior tibial veins. Echo with Dilated RV and Decreased RV function, RV normal on MRI on 9/23/2020. Also reviewed f/u with Hematology for hypercoagulable work up. Recommendation to resume Xarelto for at least one year as prescribed. \par \par Will continue f/u with Cardiologist and may RTO as needed or if any new or worsening symptoms or findings occur.\par This TELEHealth visit required approximately 25 minutes in total over the telephone.

## 2021-03-26 ENCOUNTER — RX RENEWAL (OUTPATIENT)
Age: 66
End: 2021-03-26

## 2021-04-07 ENCOUNTER — OUTPATIENT (OUTPATIENT)
Dept: OUTPATIENT SERVICES | Facility: HOSPITAL | Age: 66
LOS: 1 days | Discharge: HOME | End: 2021-04-07

## 2021-04-07 DIAGNOSIS — Z90.5 ACQUIRED ABSENCE OF KIDNEY: Chronic | ICD-10-CM

## 2021-04-07 DIAGNOSIS — I26.99 OTHER PULMONARY EMBOLISM WITHOUT ACUTE COR PULMONALE: ICD-10-CM

## 2021-04-07 PROBLEM — I82.409 ACUTE EMBOLISM AND THROMBOSIS OF UNSPECIFIED DEEP VEINS OF UNSPECIFIED LOWER EXTREMITY: Chronic | Status: ACTIVE | Noted: 2020-12-31

## 2021-04-07 PROBLEM — I47.1 SUPRAVENTRICULAR TACHYCARDIA: Chronic | Status: ACTIVE | Noted: 2020-12-31

## 2021-04-07 PROBLEM — I25.10 ATHEROSCLEROTIC HEART DISEASE OF NATIVE CORONARY ARTERY WITHOUT ANGINA PECTORIS: Chronic | Status: ACTIVE | Noted: 2020-12-31

## 2021-04-09 ENCOUNTER — APPOINTMENT (OUTPATIENT)
Dept: HEMATOLOGY ONCOLOGY | Facility: CLINIC | Age: 66
End: 2021-04-09

## 2021-12-03 NOTE — PATIENT PROFILE ADULT - NSASFUNCLEVELADLTRANSFER_GEN_A_NUR
2 = assistive person Consent (Nose)/Introductory Paragraph: The rationale for Mohs was explained to the patient and consent was obtained. The risks, benefits and alternatives to therapy were discussed in detail. Specifically, the risks of nasal deformity, changes in the flow of air through the nose, infection, scarring, bleeding, prolonged wound healing, incomplete removal, allergy to anesthesia, nerve injury and recurrence were addressed. Prior to the procedure, the treatment site was clearly identified and confirmed by the patient. All components of Universal Protocol/PAUSE Rule completed.

## 2021-12-17 ENCOUNTER — NON-APPOINTMENT (OUTPATIENT)
Age: 66
End: 2021-12-17

## 2022-01-26 ENCOUNTER — APPOINTMENT (OUTPATIENT)
Dept: GASTROENTEROLOGY | Facility: CLINIC | Age: 67
End: 2022-01-26

## 2022-02-24 ENCOUNTER — APPOINTMENT (OUTPATIENT)
Dept: GASTROENTEROLOGY | Facility: CLINIC | Age: 67
End: 2022-02-24
Payer: MEDICARE

## 2022-02-24 VITALS
OXYGEN SATURATION: 96 % | DIASTOLIC BLOOD PRESSURE: 68 MMHG | HEART RATE: 67 BPM | SYSTOLIC BLOOD PRESSURE: 102 MMHG | HEIGHT: 67 IN | WEIGHT: 186 LBS | TEMPERATURE: 97.9 F | BODY MASS INDEX: 29.19 KG/M2

## 2022-02-24 DIAGNOSIS — R19.5 OTHER FECAL ABNORMALITIES: ICD-10-CM

## 2022-02-24 DIAGNOSIS — Z12.11 ENCOUNTER FOR SCREENING FOR MALIGNANT NEOPLASM OF COLON: ICD-10-CM

## 2022-02-24 DIAGNOSIS — Z01.818 ENCOUNTER FOR OTHER PREPROCEDURAL EXAMINATION: ICD-10-CM

## 2022-02-24 PROCEDURE — 99203 OFFICE O/P NEW LOW 30 MIN: CPT

## 2022-02-24 RX ORDER — METOPROLOL SUCCINATE 25 MG/1
25 TABLET, EXTENDED RELEASE ORAL
Refills: 2 | Status: ACTIVE | COMMUNITY
Start: 2020-10-08

## 2022-02-24 RX ORDER — GABAPENTIN 100 MG/1
100 CAPSULE ORAL DAILY
Refills: 0 | Status: COMPLETED | COMMUNITY
Start: 2020-10-08 | End: 2022-02-24

## 2022-02-24 RX ORDER — CLONAZEPAM 0.5 MG/1
0.5 TABLET ORAL
Qty: 15 | Refills: 0 | Status: DISCONTINUED | COMMUNITY
Start: 2020-06-19 | End: 2022-02-24

## 2022-02-24 RX ORDER — SODIUM SULFATE, POTASSIUM SULFATE, MAGNESIUM SULFATE 17.5; 3.13; 1.6 G/ML; G/ML; G/ML
17.5-3.13-1.6 SOLUTION, CONCENTRATE ORAL
Qty: 1 | Refills: 0 | Status: ACTIVE | COMMUNITY
Start: 2022-02-24 | End: 1900-01-01

## 2022-02-24 RX ORDER — RIVAROXABAN 20 MG/1
20 TABLET, FILM COATED ORAL
Qty: 30 | Refills: 5 | Status: ACTIVE | COMMUNITY
Start: 2020-11-25

## 2022-02-24 NOTE — PHYSICAL EXAM
[General Appearance - Alert] : alert [General Appearance - In No Acute Distress] : in no acute distress [Bowel Sounds] : normal bowel sounds [Abdomen Soft] : soft [Abdomen Tenderness] : non-tender [Abdomen Mass (___ Cm)] : no abdominal mass palpated [No CVA Tenderness] : no ~M costovertebral angle tenderness [No Spinal Tenderness] : no spinal tenderness [Abnormal Walk] : normal gait [Nail Clubbing] : no clubbing  or cyanosis of the fingernails [Musculoskeletal - Swelling] : no joint swelling seen [Motor Tone] : muscle strength and tone were normal [Skin Color & Pigmentation] : normal skin color and pigmentation [Skin Turgor] : normal skin turgor [] : no rash [Deep Tendon Reflexes (DTR)] : deep tendon reflexes were 2+ and symmetric [Sensation] : the sensory exam was normal to light touch and pinprick [No Focal Deficits] : no focal deficits [Oriented To Time, Place, And Person] : oriented to person, place, and time [Impaired Insight] : insight and judgment were intact [Affect] : the affect was normal

## 2022-02-25 ENCOUNTER — TRANSCRIPTION ENCOUNTER (OUTPATIENT)
Age: 67
End: 2022-02-25

## 2022-02-27 NOTE — ASSESSMENT
[FreeTextEntry1] : This is a 66 year old female here for evaluation of positive occult stool. PMh of left shoulder pain, PE, DVT, Afib s/p ablation on Xarelto. Father with prostate cancer. Denies any difficulty swallowing or acid reflux. Stool caliber normal. No blood or dark tarry stool. Weight stable. Last colonoscopy 10 years ago and polyp noted per patient. \par ETOH/smoke: none\par I don’t have a baseline CBC on chart and patient is over due for colonoscopy\par \par My plan\par -colonoscopy off xarelto 2 days\par -Suprep\par -CBC\par -covid swab

## 2022-02-27 NOTE — HISTORY OF PRESENT ILLNESS
[de-identified] : This is a 66 year old female here for evaluation of positive occult stool. PMh of left shoulder pain, PE, DVT, Afib s/p ablation on Xarelto. Father with prostate cancer. Denies any difficulty swallowing or acid reflux. Stool caliber normal. No blood or dark tarry stool. Weight stable. Last colonoscopy 10 years ago and polyp noted per patient. \par ETOH/smoke: none

## 2022-03-08 LAB
BASOPHILS # BLD AUTO: 0.06 K/UL
BASOPHILS NFR BLD AUTO: 0.5 %
EOSINOPHIL # BLD AUTO: 0.06 K/UL
EOSINOPHIL NFR BLD AUTO: 0.5 %
HCT VFR BLD CALC: 37.9 %
HGB BLD-MCNC: 11.9 G/DL
IMM GRANULOCYTES NFR BLD AUTO: 0.2 %
LYMPHOCYTES # BLD AUTO: 3.35 K/UL
LYMPHOCYTES NFR BLD AUTO: 28.4 %
MAN DIFF?: NORMAL
MCHC RBC-ENTMCNC: 27.5 PG
MCHC RBC-ENTMCNC: 31.4 GM/DL
MCV RBC AUTO: 87.7 FL
MONOCYTES # BLD AUTO: 0.44 K/UL
MONOCYTES NFR BLD AUTO: 3.7 %
NEUTROPHILS # BLD AUTO: 7.85 K/UL
NEUTROPHILS NFR BLD AUTO: 66.7 %
PLATELET # BLD AUTO: 198 K/UL
RBC # BLD: 4.32 M/UL
RBC # FLD: 14.6 %
WBC # FLD AUTO: 11.78 K/UL

## 2022-03-14 ENCOUNTER — RESULT REVIEW (OUTPATIENT)
Age: 67
End: 2022-03-14

## 2022-03-14 ENCOUNTER — APPOINTMENT (OUTPATIENT)
Dept: GASTROENTEROLOGY | Facility: HOSPITAL | Age: 67
End: 2022-03-14

## 2022-03-14 ENCOUNTER — OUTPATIENT (OUTPATIENT)
Dept: OUTPATIENT SERVICES | Facility: HOSPITAL | Age: 67
LOS: 1 days | End: 2022-03-14
Payer: MEDICARE

## 2022-03-14 DIAGNOSIS — Z12.11 ENCOUNTER FOR SCREENING FOR MALIGNANT NEOPLASM OF COLON: ICD-10-CM

## 2022-03-14 DIAGNOSIS — R19.5 OTHER FECAL ABNORMALITIES: ICD-10-CM

## 2022-03-14 DIAGNOSIS — Z90.5 ACQUIRED ABSENCE OF KIDNEY: Chronic | ICD-10-CM

## 2022-03-14 PROCEDURE — 43239 EGD BIOPSY SINGLE/MULTIPLE: CPT

## 2022-03-14 PROCEDURE — 88312 SPECIAL STAINS GROUP 1: CPT

## 2022-03-14 PROCEDURE — 45378 DIAGNOSTIC COLONOSCOPY: CPT

## 2022-03-14 PROCEDURE — G0105: CPT

## 2022-03-14 PROCEDURE — 88312 SPECIAL STAINS GROUP 1: CPT | Mod: 26

## 2022-03-14 PROCEDURE — 88305 TISSUE EXAM BY PATHOLOGIST: CPT

## 2022-03-14 PROCEDURE — 88305 TISSUE EXAM BY PATHOLOGIST: CPT | Mod: 26

## 2022-03-14 DEVICE — CATH BALLOON DIL 6-8MM: Type: IMPLANTABLE DEVICE | Status: FUNCTIONAL

## 2022-03-14 DEVICE — CATH ESOPH DIL 9 ATM 6FR 8-10MM: Type: IMPLANTABLE DEVICE | Status: FUNCTIONAL

## 2022-03-14 DEVICE — CATH ESOPH DIL 8 ATM 6FR10-12M: Type: IMPLANTABLE DEVICE | Status: FUNCTIONAL

## 2022-03-15 LAB — SARS-COV-2 N GENE NPH QL NAA+PROBE: NOT DETECTED

## 2022-03-16 LAB — SURGICAL PATHOLOGY STUDY: SIGNIFICANT CHANGE UP

## 2022-03-27 DIAGNOSIS — A04.8 OTHER SPECIFIED BACTERIAL INTESTINAL INFECTIONS: ICD-10-CM

## 2022-04-12 RX ORDER — OMEPRAZOLE 20 MG/1
20 CAPSULE, DELAYED RELEASE ORAL TWICE DAILY
Qty: 28 | Refills: 0 | Status: ACTIVE | COMMUNITY
Start: 2022-03-27 | End: 1900-01-01

## 2022-04-12 RX ORDER — AMOXICILLIN 500 MG/1
500 CAPSULE ORAL TWICE DAILY
Qty: 56 | Refills: 0 | Status: ACTIVE | COMMUNITY
Start: 2022-03-27 | End: 1900-01-01

## 2022-04-12 RX ORDER — BISMUTH SUBSALICYLATE 262 MG
262 TABLET,CHEWABLE ORAL 4 TIMES DAILY
Qty: 8 | Refills: 1 | Status: ACTIVE | COMMUNITY
Start: 2022-03-27 | End: 1900-01-01

## 2022-04-12 RX ORDER — METRONIDAZOLE 250 MG/1
250 TABLET ORAL EVERY 6 HOURS
Qty: 28 | Refills: 0 | Status: ACTIVE | COMMUNITY
Start: 2022-03-27 | End: 1900-01-01

## 2022-05-24 LAB — UREA BREATH TEST QL: NEGATIVE

## 2022-06-06 ENCOUNTER — APPOINTMENT (OUTPATIENT)
Dept: ELECTROPHYSIOLOGY | Facility: CLINIC | Age: 67
End: 2022-06-06
Payer: MEDICARE

## 2022-06-06 ENCOUNTER — NON-APPOINTMENT (OUTPATIENT)
Age: 67
End: 2022-06-06

## 2022-06-06 VITALS
TEMPERATURE: 96.1 F | HEART RATE: 61 BPM | SYSTOLIC BLOOD PRESSURE: 120 MMHG | WEIGHT: 182 LBS | OXYGEN SATURATION: 98 % | DIASTOLIC BLOOD PRESSURE: 80 MMHG | HEIGHT: 67 IN | BODY MASS INDEX: 28.56 KG/M2 | RESPIRATION RATE: 16 BRPM

## 2022-06-06 DIAGNOSIS — Z98.890 OTHER SPECIFIED POSTPROCEDURAL STATES: ICD-10-CM

## 2022-06-06 DIAGNOSIS — Z86.79 OTHER SPECIFIED POSTPROCEDURAL STATES: ICD-10-CM

## 2022-06-06 DIAGNOSIS — I95.1 ORTHOSTATIC HYPOTENSION: ICD-10-CM

## 2022-06-06 DIAGNOSIS — R00.2 PALPITATIONS: ICD-10-CM

## 2022-06-06 DIAGNOSIS — I47.1 SUPRAVENTRICULAR TACHYCARDIA: ICD-10-CM

## 2022-06-06 PROCEDURE — 93000 ELECTROCARDIOGRAM COMPLETE: CPT

## 2022-06-06 PROCEDURE — 99215 OFFICE O/P EST HI 40 MIN: CPT

## 2022-06-06 RX ORDER — CLOTRIMAZOLE AND BETAMETHASONE DIPROPIONATE 10; .5 MG/G; MG/G
1-0.05 CREAM TOPICAL
Qty: 45 | Refills: 0 | Status: ACTIVE | COMMUNITY
Start: 2022-04-11

## 2022-06-06 NOTE — HISTORY OF PRESENT ILLNESS
[FreeTextEntry1] : Oscar Jasso is a 66y/o woman with Hx of positive tilt table test/orthostatic hypotension, non obstructive CAD, renal donor s/p nephrectomy, VSD, and recurring palpitations with attempted AT ablation (not performed- 2015) and now s/p PVI, PWI, and CTI ablation on 1/21/2021 who presents today for routine f/u. Had been doing well but struggles with occasional palpitations and fatigue. Remains on Xarelto 20mg daily. Denies s/s of bleeding. Denies chest pain, SOB, syncope or near syncope.\par \par Prior to ablation, she had a recent hospitalization for PE. She was previously for a AT ablation but was noted to be dyspneic during PSTs and sent to the ER where she was admitted with newly found PE. Now on AC, Xarelto 20mg daily. Remains on metoprolol. In 2015, underwent EP study which revealed runs of AT, unable to be sustained long enough to map or perform ablation.

## 2022-06-06 NOTE — CARDIOLOGY SUMMARY
[de-identified] : 12/13/2018, normal LV function  [de-identified] : Cardiac MRI: 9/23/2020, Normal LV size and systolic function. No myocardial edema. No LGE. Mild LA enlargement. Mild right atrial enlargement. Normal RV size. No ASD or VSD. No valvular abnormalities.

## 2022-06-06 NOTE — DISCUSSION/SUMMARY
[FreeTextEntry1] : Impression:\par \par 1. Paroxysmal afib:  s/p PVI, PWI, and CTI ablation on 1/21/2021. EKG performed today to assess for presence of recurrent afib and reveals NSR with apc. Review of ECHO with normal LVEF. Remains on Xarelto. Recommend 30 day CardioNet to assess for presence of recurrent afib or tachyarrhythmias associated with symptoms. \par \par 2. Orthostatic hypotension: No recent syncope or near syncope and no dizziness. Instructed on safety mechanisms such as staying well hydrated, utilizing salt, avoiding prolonged standing with knees locked, and to lie down with feet elevated if symptoms of near syncope occur. Can also consider use of compression socks and should avoid known triggers for syncope such as alcohol and warm or crowded environments. Move slowly during positional changes. \par \par Plan for 30 day CardioNet.

## 2022-06-14 ENCOUNTER — NON-APPOINTMENT (OUTPATIENT)
Age: 67
End: 2022-06-14

## 2022-08-16 ENCOUNTER — NON-APPOINTMENT (OUTPATIENT)
Age: 67
End: 2022-08-16

## 2022-12-02 NOTE — H&P PST ADULT - FUNCTIONAL LEVEL PRIOR: TOILETING
PT akilahal performed - pt increased pain and nausea limiting mobility ~30ft CGA with RW. Discussed with RN. Will re-attempt this PM for stair navigation as appropriate. 0 = independent

## 2023-04-20 NOTE — ED PROVIDER NOTE - NS ED MD EM SELECTION
DOI:   04/17/2023  Initial Treatment Date:   4/17/2023   Date Last Seen: 4/18/2023  [facility] of Onset:  Unknown  Occupation: Retired/disabled  Referring by:Kike Santana DC  Primary Care Physician: Jing Kulkarni MD  Date informed consent signed:  6/20/2018  ABN: Not applicable  I have reviewed the past medical history, family history, social history, medications and allergies listed in the medical record as obtained by my nursing staff and support staff and agree with their documentation.  Allergies, Medications, Medical history, Surgical history, Social history and Family history were reviewed and updated.  Romel  reports that he has been smoking Cigarettes.  He has a 17.50 pack-year smoking history. He has never used smokeless tobacco.  Romel has No Known Allergies.  Advance Directive Status: Intact  Visit Number of Current Episode:  3     Subjective:  Romel is a 64 year old male who comes in today for treatment of acute mid to low back pain.  patient is rubbing, but still some back soreness..   He has been able to manage care fairly well:  He would like a few more treatments next week.       Recent history:  Prior to today he has been able to do basic activities of daily living very well.  back has been acting up the last month.  No particular injury.   More right than left.  He has a hard time walking and basically appears to be describing stenotic symptomatology.  As noted below he has had fusion in 2013/14 by Dr. Arambula.   He felt the mobilization/Fortune technique works very well for him.         Patient rates his pain today at 3-4/10 with 10 being worst.     Medications currently taking:   Herbal remedies/vitamins, Aspirin/anti-inflammatories, muscle relaxants, other see Epic     On average how many times per week do you exercise for at least 20 minutes? 0  What type of exercise do you perform? Simple stretches to the best of his tolerance.     Objective:       OBJECTIVE FINDINGS:   Objective disability index  score from the initial encounter for this episode is 64 using the Oswestry questionnaire.     Lumbar: Observation patient is sitting somewhat flexed.   continue slow and guarded from sitting to standing.  All ranges of motion are very limited to 20-25 degrees.     Was able to lie prone.  There is fairly specific point tenderness L4-5, L5-S1 segments with some soft tissue spasming noted.   and tissue L3-L5 S1 bilaterally.        Patient Emotional screening patient very pleasant.     Relevant Diagnostic Imaging/Testin plain films:FINDINGS: 2 views were performed of the lumbar spine.     The patient is status post L3-L5 laminectomy and L4-5 fusion. Pedicle  screws and interbody spacers are seen. Lateral bone graft is seen as well.  The postsurgical appearance is stable. No fracture nor dislocation is  seen. Mild multilevel degenerative disc disease elsewhere is stable.     IMPRESSION: Stable post surgical appearance of the lumbar spine.        Assessment:  Postlaminectomy low back syndrome/muscle spasm   Thoracic, lumbar, sacral base some attic and segmental dysfunction     Complicating Factors/Co-morbidities:  Lumbar fusion L4 5     Plan:  Recommended therapeutic trial of decompression.  Patient was able to lie prone.  Graston tool 1 and 3 on the left lumbar paraspinals.   Fortune protocol to very gently applied to 50 - 60 % range of motion to the L5-S1 level.  Secondarily L4-5.  Sacral base traction utilizing sacrum and lower thoracic.  He got up feeling slightly better from the table than when he was lying down.  No manipulative thrust.   him on Thursday.       Therapeutic Exercise/Rehab/Modalities performed today:  Seated moist heat x 8 minutes.    Patient Instruction/Education:  Walking to tolerance, flexion based gently applied in bed. Moist heat recommended. Patient stated understanding of, and was in agreement with, the discussed instructions.     Goals of Care: Goal of care is to improve muscular and  skeletal function and provide symptom relief.  Recommendation of follow-up daily this week for manual care. We did discuss I expect this to do some natural resolution but we may be potentially able to assist the healing.     Length of Visit: 15 minutes.   [id number] Comprehensive

## 2024-06-24 PROBLEM — R91.1 LUNG NODULE: Status: ACTIVE | Noted: 2024-06-24

## 2024-06-25 ENCOUNTER — APPOINTMENT (OUTPATIENT)
Dept: THORACIC SURGERY | Facility: CLINIC | Age: 69
End: 2024-06-25
Payer: MEDICARE

## 2024-06-25 VITALS
SYSTOLIC BLOOD PRESSURE: 145 MMHG | OXYGEN SATURATION: 95 % | WEIGHT: 182 LBS | HEIGHT: 67 IN | HEART RATE: 83 BPM | DIASTOLIC BLOOD PRESSURE: 80 MMHG | BODY MASS INDEX: 28.56 KG/M2

## 2024-06-25 DIAGNOSIS — R91.1 SOLITARY PULMONARY NODULE: ICD-10-CM

## 2024-06-25 PROCEDURE — 99205 OFFICE O/P NEW HI 60 MIN: CPT

## 2024-07-03 ENCOUNTER — APPOINTMENT (OUTPATIENT)
Dept: NUCLEAR MEDICINE | Facility: CLINIC | Age: 69
End: 2024-07-03
Payer: MEDICARE

## 2024-07-03 PROCEDURE — 78815 PET IMAGE W/CT SKULL-THIGH: CPT | Mod: PI

## 2024-07-03 PROCEDURE — A9552: CPT

## 2024-07-09 ENCOUNTER — APPOINTMENT (OUTPATIENT)
Dept: PULMONOLOGY | Facility: CLINIC | Age: 69
End: 2024-07-09

## 2024-07-12 ENCOUNTER — APPOINTMENT (OUTPATIENT)
Dept: PULMONOLOGY | Facility: CLINIC | Age: 69
End: 2024-07-12

## 2024-07-12 VITALS
OXYGEN SATURATION: 97 % | TEMPERATURE: 97.2 F | BODY MASS INDEX: 29.03 KG/M2 | HEART RATE: 77 BPM | SYSTOLIC BLOOD PRESSURE: 126 MMHG | HEIGHT: 67 IN | DIASTOLIC BLOOD PRESSURE: 76 MMHG | WEIGHT: 185 LBS

## 2024-07-12 PROCEDURE — 94727 GAS DIL/WSHOT DETER LNG VOL: CPT

## 2024-07-12 PROCEDURE — 94060 EVALUATION OF WHEEZING: CPT

## 2024-07-12 PROCEDURE — 99203 OFFICE O/P NEW LOW 30 MIN: CPT | Mod: 25

## 2024-07-12 PROCEDURE — 94729 DIFFUSING CAPACITY: CPT

## 2024-07-18 ENCOUNTER — OUTPATIENT (OUTPATIENT)
Dept: OUTPATIENT SERVICES | Facility: HOSPITAL | Age: 69
LOS: 1 days | End: 2024-07-18

## 2024-07-18 VITALS
SYSTOLIC BLOOD PRESSURE: 124 MMHG | WEIGHT: 184.97 LBS | DIASTOLIC BLOOD PRESSURE: 76 MMHG | OXYGEN SATURATION: 97 % | TEMPERATURE: 98 F | RESPIRATION RATE: 16 BRPM | HEIGHT: 67 IN | HEART RATE: 66 BPM

## 2024-07-18 DIAGNOSIS — R91.1 SOLITARY PULMONARY NODULE: ICD-10-CM

## 2024-07-18 DIAGNOSIS — Z86.711 PERSONAL HISTORY OF PULMONARY EMBOLISM: ICD-10-CM

## 2024-07-18 DIAGNOSIS — R91.8 OTHER NONSPECIFIC ABNORMAL FINDING OF LUNG FIELD: ICD-10-CM

## 2024-07-18 DIAGNOSIS — Z90.5 ACQUIRED ABSENCE OF KIDNEY: Chronic | ICD-10-CM

## 2024-07-18 LAB
ANION GAP SERPL CALC-SCNC: 16 MMOL/L — HIGH (ref 7–14)
BLD GP AB SCN SERPL QL: NEGATIVE — SIGNIFICANT CHANGE UP
BUN SERPL-MCNC: 25 MG/DL — HIGH (ref 7–23)
CALCIUM SERPL-MCNC: 9.1 MG/DL — SIGNIFICANT CHANGE UP (ref 8.4–10.5)
CHLORIDE SERPL-SCNC: 104 MMOL/L — SIGNIFICANT CHANGE UP (ref 98–107)
CO2 SERPL-SCNC: 21 MMOL/L — LOW (ref 22–31)
CREAT SERPL-MCNC: 0.95 MG/DL — SIGNIFICANT CHANGE UP (ref 0.5–1.3)
EGFR: 64 ML/MIN/1.73M2 — SIGNIFICANT CHANGE UP
GLUCOSE SERPL-MCNC: 80 MG/DL — SIGNIFICANT CHANGE UP (ref 70–99)
HCT VFR BLD CALC: 36.5 % — SIGNIFICANT CHANGE UP (ref 34.5–45)
HGB BLD-MCNC: 11.8 G/DL — SIGNIFICANT CHANGE UP (ref 11.5–15.5)
MCHC RBC-ENTMCNC: 27.6 PG — SIGNIFICANT CHANGE UP (ref 27–34)
MCHC RBC-ENTMCNC: 32.3 GM/DL — SIGNIFICANT CHANGE UP (ref 32–36)
MCV RBC AUTO: 85.5 FL — SIGNIFICANT CHANGE UP (ref 80–100)
NRBC # BLD: 0 /100 WBCS — SIGNIFICANT CHANGE UP (ref 0–0)
NRBC # FLD: 0 K/UL — SIGNIFICANT CHANGE UP (ref 0–0)
PLATELET # BLD AUTO: 200 K/UL — SIGNIFICANT CHANGE UP (ref 150–400)
POTASSIUM SERPL-MCNC: 4.2 MMOL/L — SIGNIFICANT CHANGE UP (ref 3.5–5.3)
POTASSIUM SERPL-SCNC: 4.2 MMOL/L — SIGNIFICANT CHANGE UP (ref 3.5–5.3)
RBC # BLD: 4.27 M/UL — SIGNIFICANT CHANGE UP (ref 3.8–5.2)
RBC # FLD: 14.3 % — SIGNIFICANT CHANGE UP (ref 10.3–14.5)
RH IG SCN BLD-IMP: POSITIVE — SIGNIFICANT CHANGE UP
RH IG SCN BLD-IMP: POSITIVE — SIGNIFICANT CHANGE UP
SODIUM SERPL-SCNC: 141 MMOL/L — SIGNIFICANT CHANGE UP (ref 135–145)
WBC # BLD: 8.61 K/UL — SIGNIFICANT CHANGE UP (ref 3.8–10.5)
WBC # FLD AUTO: 8.61 K/UL — SIGNIFICANT CHANGE UP (ref 3.8–10.5)

## 2024-07-18 RX ORDER — DEXTROSE MONOHYDRATE, SODIUM CHLORIDE, SODIUM LACTATE, CALCIUM CHLORIDE, MAGNESIUM CHLORIDE 1.5; 538; 448; 18.4; 5.08 G/100ML; MG/100ML; MG/100ML; MG/100ML; MG/100ML
1000 SOLUTION INTRAPERITONEAL
Refills: 0 | Status: DISCONTINUED | OUTPATIENT
Start: 2024-07-25 | End: 2024-07-26

## 2024-07-18 NOTE — H&P PST ADULT - ASSESSMENT
Pt. is a 68 yo female accompanied by her daughter.  Pt. has a right lung mass that has increased in size.

## 2024-07-18 NOTE — H&P PST ADULT - NSICDXPASTMEDICALHX_GEN_ALL_CORE_FT
PAST MEDICAL HISTORY:  Atrial tachycardia     DVT, lower extremity     History of orthostatic hypotension     History of palpitations     Mass of right lung     Nonobstructive atherosclerosis of coronary artery     Pulmonary embolism     Single kidney     Supraventricular tachycardia     Ventricular septal defect

## 2024-07-18 NOTE — H&P PST ADULT - SKIN/BREAST
[General Appearance - Alert] : alert [General Appearance - In No Acute Distress] : in no acute distress [General Appearance - Well-Appearing] : healthy appearing [Oriented To Time, Place, And Person] : oriented to person, place, and time [Impaired Insight] : insight and judgment were intact [Affect] : the affect was normal [Memory Recent] : recent memory was not impaired [Person] : oriented to person [Place] : oriented to place [Time] : oriented to time [Short Term Intact] : short term memory impaired [Remote Intact] : remote memory intact [Registration Intact] : recent registration memory intact [Concentration Intact] : normal concentrating ability [Fluency] : fluency intact [Comprehension] : comprehension intact [Past History] : adequate knowledge of personal past history [Cranial Nerves Optic (II)] : visual acuity intact bilaterally,  visual fields full to confrontation, pupils equal round and reactive to light [Cranial Nerves Oculomotor (III)] : extraocular motion intact [Cranial Nerves Trigeminal (V)] : facial sensation intact symmetrically [Cranial Nerves Facial (VII)] : face symmetrical [Cranial Nerves Vestibulocochlear (VIII)] : hearing was intact bilaterally [Motor Tone] : muscle tone was normal in all four extremities [Motor Strength] : muscle strength was normal in all four extremities [No Muscle Atrophy] : normal bulk in all four extremities [Paresis Pronator Drift Right-Sided] : no pronator drift on the right [Paresis Pronator Drift Left-Sided] : no pronator drift on the left [Sensation Tactile Decrease] : light touch was intact [Sensation Pain / Temperature Decrease] : pain and temperature was intact [Abnormal Walk] : normal gait [Balance] : balance was intact [Past-pointing] : there was no past-pointing [Tremor] : no tremor present [Coordination - Dysmetria Impaired Finger-to-Nose Bilateral] : not present [Coordination - Dysmetria Impaired Heel-to-Shin Bilateral] : not present [2+] : Ankle jerk left 2+ [Plantar Reflex Right Only] : normal on the right [Plantar Reflex Left Only] : normal on the left [FreeTextEntry4] : Short-term recall 2/3.   [PERRL With Normal Accommodation] : pupils were equal in size, round, reactive to light, with normal accommodation details…

## 2024-07-18 NOTE — H&P PST ADULT - PROBLEM SELECTOR PLAN 1
Pt. is scheduled for a robotic assisted VATs, right upper lobe wedge resection...7/25/24.  Pt. verbalized understanding of instructions and that Chlorhexidine is for external use.

## 2024-07-18 NOTE — H&P PST ADULT - HISTORY OF PRESENT ILLNESS
Pt. is a 68 yo female with a lung mass that has been watched for the last few years.  The last CT scan revealed that it has increased in size.

## 2024-07-18 NOTE — H&P PST ADULT - ATTENDING COMMENTS
patient seen - plan for lung resection via robotic right vats, possible open with IR marking of RUL lesion. discussed risks of surgery including and not limited to bleeding, infection, injury to surrounding structures, prolonged air leak, afib, post op pneumonia, dvt, cardiac event. all questions answered, patient expressed understanding and agreeable to proceed. daughter at bedside.

## 2024-07-18 NOTE — H&P PST ADULT - NSICDXFAMILYHX_GEN_ALL_CORE_FT
FAMILY HISTORY:  Father  Still living? Unknown  Family history of prostate cancer, Age at diagnosis: Age Unknown    Mother  Still living? Yes, Estimated age: Age Unknown  FH: diabetes mellitus, Age at diagnosis: Age Unknown

## 2024-07-22 ENCOUNTER — NON-APPOINTMENT (OUTPATIENT)
Age: 69
End: 2024-07-22

## 2024-07-23 ENCOUNTER — NON-APPOINTMENT (OUTPATIENT)
Age: 69
End: 2024-07-23

## 2024-07-23 ENCOUNTER — APPOINTMENT (OUTPATIENT)
Dept: INTERVENTIONAL RADIOLOGY/VASCULAR | Facility: CLINIC | Age: 69
End: 2024-07-23
Payer: MEDICARE

## 2024-07-23 VITALS — HEIGHT: 67 IN | BODY MASS INDEX: 29.66 KG/M2 | WEIGHT: 189 LBS

## 2024-07-23 DIAGNOSIS — Z77.22 CONTACT WITH AND (SUSPECTED) EXPOSURE TO ENVIRONMENTAL TOBACCO SMOKE (ACUTE) (CHRONIC): ICD-10-CM

## 2024-07-23 DIAGNOSIS — Z78.9 OTHER SPECIFIED HEALTH STATUS: ICD-10-CM

## 2024-07-23 DIAGNOSIS — R91.1 SOLITARY PULMONARY NODULE: ICD-10-CM

## 2024-07-23 PROCEDURE — 99204 OFFICE O/P NEW MOD 45 MIN: CPT

## 2024-07-23 NOTE — PHYSICAL EXAM
[Alert] : alert [Well Nourished] : well nourished [Oriented x3] : oriented to person, place, and time

## 2024-07-23 NOTE — REASON FOR VISIT
[Home] : at home, [unfilled] , at the time of the visit. [Medical Office: ([facility])___] : at the medical office located in  [Patient] : the patient [Self] : self [FreeTextEntry1] : right lung marking

## 2024-07-23 NOTE — HISTORY OF PRESENT ILLNESS
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   CT angio chest on 06/08/2024: ([facility]) A noncalcified right upper lobe lung nodule with lobulated margins now measures 9 x 8 x 8 mm, previously 7 x 6 x 6 mm (2021).Dilated proximal aorta: Sinus of Valsalva 4.2 cm and proximal ascending thoracic aorta 4.2 cm, not significantly changed. Enlarged main pulmonary artery suggesting pulmonary artery hypertension. Stable small hiatal hernia with small distal esophageal diverticulum.  PET/CT demonstrated, "Revisualized RIGHT UPPER lobe rounded pulmonary nodule is without abnormal uptake. This is indeterminate for neoplasm and continued surveillance is suggested with dedicated chest CT. Otherwise, no evidence of FDG-avid malignancy."  Patient reports that 6 months ago she had hemoptysis, resolved on its own (was evaluated by ENT and dentist) and has had one episode since then about 2 weeks ago which resolved on the same day.   Patient was seen by Dr. Edmonds and plan is for Right VATS RUL wedge resection possible segmentectomy with IR marking, MLND.   Patient was seen by PCP Dr. Rafaela Evans who referred patient to CT surgery for consultation  Denies any recent SOB, CP, fever, chills, n/v/d.  **last dose of Xarelto was on 7/22/24

## 2024-07-23 NOTE — REASON FOR VISIT
[Home] : at home, [unfilled] , at the time of the visit. [Medical Office: ([facility])___] : at the medical office located in  [Patient] : the patient [Self] : self [FreeTextEntry1] : right lung marking

## 2024-07-23 NOTE — ASSESSMENT
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  CT angio chest on 06/08/2024: ([facility]) A noncalcified right upper lobe lung nodule with lobulated margins now measures 9 x 8 x 8 mm, previously 7 x 6 x 6 mm (2021).Dilated proximal aorta: Sinus of Valsalva 4.2 cm and proximal ascending thoracic aorta 4.2 cm, not significantly changed. Enlarged main pulmonary artery suggesting pulmonary artery hypertension. Stable small hiatal hernia with small distal esophageal diverticulum. PET/CT demonstrated, "Revisualized RIGHT UPPER lobe rounded pulmonary nodule is without abnormal uptake. This is indeterminate for neoplasm and continued surveillance is suggested with dedicated chest CT. Otherwise, no evidence of FDG-avid malignancy."  Patient was seen by Dr. Edmonds and plan is for Right VATS RUL wedge resection possible segmentectomy with IR marking, MLND.   The full procedure of CT guided lung nodule localization was discussed with the patient. This included a discussion of the risks, benefits, and alternatives. Risks discussed included, but were not limited to, the risks of bleeding, infection, pneumothorax, potential need for chest tube insertion, coil dislodgement and migration. Ample time was provided to answer their questions. Consent was obtained at the time of consultation.   Plan: CT guided lung nodule localization. Scan before sedation. Patient in prone position. CT Chest [facility] 6/8/24 4-22

## 2024-07-23 NOTE — REVIEW OF SYSTEMS
[SOB on Exertion] : shortness of breath during exertion [Easy Bleeding] : a tendency for easy bleeding [Easy Bruising] : a tendency for easy bruising [Fever] : no fever [Chills] : no chills [Nosebleeds] : no nosebleeds [Sore Throat] : no sore throat [Chest Pain] : no chest pain [Shortness Of Breath] : no shortness of breath [Wheezing] : no wheezing [Cough] : no cough [Abdominal Pain] : no abdominal pain [Vomiting] : no vomiting [Constipation] : no constipation

## 2024-07-24 ENCOUNTER — TRANSCRIPTION ENCOUNTER (OUTPATIENT)
Age: 69
End: 2024-07-24

## 2024-07-24 RX ORDER — HEPARIN SODIUM 1000 [USP'U]/ML
5000 INJECTION, SOLUTION INTRAVENOUS; SUBCUTANEOUS ONCE
Refills: 0 | Status: DISCONTINUED | OUTPATIENT
Start: 2024-07-25 | End: 2024-07-25

## 2024-07-24 RX ORDER — ACETAMINOPHEN 500 MG
975 TABLET ORAL ONCE
Refills: 0 | Status: DISCONTINUED | OUTPATIENT
Start: 2024-07-25 | End: 2024-07-25

## 2024-07-24 NOTE — ASU PATIENT PROFILE, ADULT - TEACHING/LEARNING LEARNING PREFERENCES
Patient is requesting K+ to be sent to mail order. Patient is requesting myrbetriq to be sent to [facility].//charlie   written material

## 2024-07-24 NOTE — ASU PATIENT PROFILE, ADULT - DOES PATIENT HAVE ADVANCE DIRECTIVE
Notified attending MD Alisa r/t patient requiring non-rebreather mask and elevated BP w/tachycardia.   Educated and declined/No

## 2024-07-25 ENCOUNTER — RESULT REVIEW (OUTPATIENT)
Age: 69
End: 2024-07-25

## 2024-07-25 ENCOUNTER — INPATIENT (INPATIENT)
Facility: HOSPITAL | Age: 69
LOS: 6 days | Discharge: HOME | End: 2024-08-01
Attending: STUDENT IN AN ORGANIZED HEALTH CARE EDUCATION/TRAINING PROGRAM | Admitting: STUDENT IN AN ORGANIZED HEALTH CARE EDUCATION/TRAINING PROGRAM
Payer: MEDICARE

## 2024-07-25 ENCOUNTER — APPOINTMENT (OUTPATIENT)
Dept: THORACIC SURGERY | Facility: HOSPITAL | Age: 69
End: 2024-07-25

## 2024-07-25 VITALS
DIASTOLIC BLOOD PRESSURE: 76 MMHG | HEART RATE: 66 BPM | OXYGEN SATURATION: 97 % | SYSTOLIC BLOOD PRESSURE: 119 MMHG | RESPIRATION RATE: 16 BRPM | WEIGHT: 184.97 LBS | HEIGHT: 67 IN | TEMPERATURE: 98 F

## 2024-07-25 DIAGNOSIS — Z90.5 ACQUIRED ABSENCE OF KIDNEY: Chronic | ICD-10-CM

## 2024-07-25 DIAGNOSIS — R91.1 SOLITARY PULMONARY NODULE: ICD-10-CM

## 2024-07-25 LAB
ANION GAP SERPL CALC-SCNC: 12 MMOL/L — SIGNIFICANT CHANGE UP (ref 7–14)
APTT BLD: 26.5 SEC — SIGNIFICANT CHANGE UP (ref 24.5–35.6)
BUN SERPL-MCNC: 20 MG/DL — SIGNIFICANT CHANGE UP (ref 7–23)
CALCIUM SERPL-MCNC: 8.5 MG/DL — SIGNIFICANT CHANGE UP (ref 8.4–10.5)
CHLORIDE SERPL-SCNC: 106 MMOL/L — SIGNIFICANT CHANGE UP (ref 98–107)
CO2 SERPL-SCNC: 22 MMOL/L — SIGNIFICANT CHANGE UP (ref 22–31)
CREAT SERPL-MCNC: 0.81 MG/DL — SIGNIFICANT CHANGE UP (ref 0.5–1.3)
EGFR: 78 ML/MIN/1.73M2 — SIGNIFICANT CHANGE UP
GLUCOSE SERPL-MCNC: 183 MG/DL — HIGH (ref 70–99)
HCT VFR BLD CALC: 35.2 % — SIGNIFICANT CHANGE UP (ref 34.5–45)
HGB BLD-MCNC: 11.4 G/DL — LOW (ref 11.5–15.5)
INR BLD: 0.98 RATIO — SIGNIFICANT CHANGE UP (ref 0.85–1.18)
MAGNESIUM SERPL-MCNC: 1.9 MG/DL — SIGNIFICANT CHANGE UP (ref 1.6–2.6)
MCHC RBC-ENTMCNC: 27.6 PG — SIGNIFICANT CHANGE UP (ref 27–34)
MCHC RBC-ENTMCNC: 32.4 GM/DL — SIGNIFICANT CHANGE UP (ref 32–36)
MCV RBC AUTO: 85.2 FL — SIGNIFICANT CHANGE UP (ref 80–100)
NRBC # BLD: 0 /100 WBCS — SIGNIFICANT CHANGE UP (ref 0–0)
NRBC # FLD: 0 K/UL — SIGNIFICANT CHANGE UP (ref 0–0)
PHOSPHATE SERPL-MCNC: 3.8 MG/DL — SIGNIFICANT CHANGE UP (ref 2.5–4.5)
PLATELET # BLD AUTO: 161 K/UL — SIGNIFICANT CHANGE UP (ref 150–400)
POTASSIUM SERPL-MCNC: 3.8 MMOL/L — SIGNIFICANT CHANGE UP (ref 3.5–5.3)
POTASSIUM SERPL-SCNC: 3.8 MMOL/L — SIGNIFICANT CHANGE UP (ref 3.5–5.3)
PROTHROM AB SERPL-ACNC: 11.1 SEC — SIGNIFICANT CHANGE UP (ref 9.5–13)
RBC # BLD: 4.13 M/UL — SIGNIFICANT CHANGE UP (ref 3.8–5.2)
RBC # FLD: 14.4 % — SIGNIFICANT CHANGE UP (ref 10.3–14.5)
SODIUM SERPL-SCNC: 140 MMOL/L — SIGNIFICANT CHANGE UP (ref 135–145)
WBC # BLD: 11.71 K/UL — HIGH (ref 3.8–10.5)
WBC # FLD AUTO: 11.71 K/UL — HIGH (ref 3.8–10.5)

## 2024-07-25 PROCEDURE — 32666 THORACOSCOPY W/WEDGE RESECT: CPT | Mod: RT

## 2024-07-25 PROCEDURE — 88112 CYTOPATH CELL ENHANCE TECH: CPT | Mod: 26

## 2024-07-25 PROCEDURE — 88305 TISSUE EXAM BY PATHOLOGIST: CPT | Mod: 26,XP

## 2024-07-25 PROCEDURE — S2900 ROBOTIC SURGICAL SYSTEM: CPT | Mod: NC

## 2024-07-25 PROCEDURE — 32674 THORACOSCOPY LYMPH NODE EXC: CPT

## 2024-07-25 PROCEDURE — 88305 TISSUE EXAM BY PATHOLOGIST: CPT | Mod: 26

## 2024-07-25 PROCEDURE — 88307 TISSUE EXAM BY PATHOLOGIST: CPT | Mod: 26

## 2024-07-25 PROCEDURE — 32674 THORACOSCOPY LYMPH NODE EXC: CPT | Mod: AS

## 2024-07-25 PROCEDURE — 32553 INS MARK THOR FOR RT PERQ: CPT

## 2024-07-25 PROCEDURE — 99233 SBSQ HOSP IP/OBS HIGH 50: CPT

## 2024-07-25 PROCEDURE — 71045 X-RAY EXAM CHEST 1 VIEW: CPT | Mod: 26

## 2024-07-25 PROCEDURE — 88331 PATH CONSLTJ SURG 1 BLK 1SPC: CPT | Mod: 26

## 2024-07-25 PROCEDURE — 32666 THORACOSCOPY W/WEDGE RESECT: CPT | Mod: AS,RT

## 2024-07-25 DEVICE — STAPLER COVIDIEN TRI-STAPLE 60MM PURPLE RELOAD: Type: IMPLANTABLE DEVICE | Status: FUNCTIONAL

## 2024-07-25 DEVICE — PROGEL PLEURAL AIR LEAK 4ML: Type: IMPLANTABLE DEVICE | Status: FUNCTIONAL

## 2024-07-25 DEVICE — ARISTA 3GR: Type: IMPLANTABLE DEVICE | Status: FUNCTIONAL

## 2024-07-25 DEVICE — STAPLER COVIDIEN TRI-STAPLE 60MM BLACK RELOAD: Type: IMPLANTABLE DEVICE | Status: FUNCTIONAL

## 2024-07-25 DEVICE — CHEST DRAIN THORACIC ARGYLE PVC 28FR STRAIGHT: Type: IMPLANTABLE DEVICE | Status: FUNCTIONAL

## 2024-07-25 DEVICE — LIGATING CLIPS WECK HEMOLOK POLYMER MEDIUM-LARGE (GREEN) 6: Type: IMPLANTABLE DEVICE | Status: FUNCTIONAL

## 2024-07-25 RX ORDER — METOPROLOL TARTRATE 100 MG
12.5 TABLET ORAL EVERY 12 HOURS
Refills: 0 | Status: DISCONTINUED | OUTPATIENT
Start: 2024-07-25 | End: 2024-07-25

## 2024-07-25 RX ORDER — SENNOSIDES 8.6 MG/1
2 TABLET ORAL AT BEDTIME
Refills: 0 | Status: DISCONTINUED | OUTPATIENT
Start: 2024-07-25 | End: 2024-08-01

## 2024-07-25 RX ORDER — NALOXONE HYDROCHLORIDE 0.4 MG/ML
0.1 INJECTION, SOLUTION INTRAMUSCULAR; INTRAVENOUS; SUBCUTANEOUS
Refills: 0 | Status: DISCONTINUED | OUTPATIENT
Start: 2024-07-25 | End: 2024-08-01

## 2024-07-25 RX ORDER — HYDROMORPHONE HCL IN 0.9% NACL 0.2 MG/ML
30 PLASTIC BAG, INJECTION (ML) INTRAVENOUS
Refills: 0 | Status: DISCONTINUED | OUTPATIENT
Start: 2024-07-25 | End: 2024-07-26

## 2024-07-25 RX ORDER — LEVALBUTEROL HYDROCHLORIDE 0.31 MG/3ML
0.63 SOLUTION RESPIRATORY (INHALATION) EVERY 6 HOURS
Refills: 0 | Status: DISCONTINUED | OUTPATIENT
Start: 2024-07-25 | End: 2024-08-01

## 2024-07-25 RX ORDER — IPRATROPIUM BROMIDE AND ALBUTEROL SULFATE 2.5; .5 MG/3ML; MG/3ML
3 SOLUTION RESPIRATORY (INHALATION) EVERY 6 HOURS
Refills: 0 | Status: DISCONTINUED | OUTPATIENT
Start: 2024-07-25 | End: 2024-07-25

## 2024-07-25 RX ORDER — ACETAMINOPHEN 500 MG
1000 TABLET ORAL EVERY 6 HOURS
Refills: 0 | Status: DISCONTINUED | OUTPATIENT
Start: 2024-07-25 | End: 2024-07-26

## 2024-07-25 RX ORDER — MAGNESIUM SULFATE 500 MG/ML
1 VIAL (ML) INJECTION ONCE
Refills: 0 | Status: COMPLETED | OUTPATIENT
Start: 2024-07-25 | End: 2024-07-25

## 2024-07-25 RX ORDER — BACTERIOSTATIC SODIUM CHLORIDE 0.9 %
4 VIAL (ML) INJECTION EVERY 12 HOURS
Refills: 0 | Status: DISCONTINUED | OUTPATIENT
Start: 2024-07-25 | End: 2024-08-01

## 2024-07-25 RX ORDER — METOPROLOL TARTRATE 100 MG
12.5 TABLET ORAL
Refills: 0 | Status: DISCONTINUED | OUTPATIENT
Start: 2024-07-25 | End: 2024-07-27

## 2024-07-25 RX ORDER — HYDROMORPHONE HCL IN 0.9% NACL 0.2 MG/ML
0.5 PLASTIC BAG, INJECTION (ML) INTRAVENOUS ONCE
Refills: 0 | Status: DISCONTINUED | OUTPATIENT
Start: 2024-07-25 | End: 2024-07-25

## 2024-07-25 RX ORDER — LIDOCAINE 5% 5 G/100G
1 CREAM TOPICAL
Refills: 0 | Status: DISCONTINUED | OUTPATIENT
Start: 2024-07-25 | End: 2024-08-01

## 2024-07-25 RX ORDER — HEPARIN SODIUM 1000 [USP'U]/ML
5000 INJECTION, SOLUTION INTRAVENOUS; SUBCUTANEOUS EVERY 8 HOURS
Refills: 0 | Status: DISCONTINUED | OUTPATIENT
Start: 2024-07-25 | End: 2024-07-26

## 2024-07-25 RX ORDER — LORATADINE 10 MG
17 TABLET,DISINTEGRATING ORAL DAILY
Refills: 0 | Status: DISCONTINUED | OUTPATIENT
Start: 2024-07-25 | End: 2024-08-01

## 2024-07-25 RX ORDER — POTASSIUM CHLORIDE 1500 MG/1
20 TABLET, EXTENDED RELEASE ORAL ONCE
Refills: 0 | Status: COMPLETED | OUTPATIENT
Start: 2024-07-25 | End: 2024-07-25

## 2024-07-25 RX ORDER — DORNASE ALFA 1 MG/ML
2.5 SOLUTION RESPIRATORY (INHALATION) EVERY 12 HOURS
Refills: 0 | Status: DISCONTINUED | OUTPATIENT
Start: 2024-07-25 | End: 2024-08-01

## 2024-07-25 RX ORDER — HYDROMORPHONE HCL IN 0.9% NACL 0.2 MG/ML
0.3 PLASTIC BAG, INJECTION (ML) INTRAVENOUS
Refills: 0 | Status: DISCONTINUED | OUTPATIENT
Start: 2024-07-25 | End: 2024-07-26

## 2024-07-25 RX ORDER — ONDANSETRON HCL/PF 4 MG/2 ML
4 VIAL (ML) INJECTION EVERY 6 HOURS
Refills: 0 | Status: DISCONTINUED | OUTPATIENT
Start: 2024-07-25 | End: 2024-08-01

## 2024-07-25 RX ADMIN — Medication 0.5 MILLIGRAM(S): at 16:30

## 2024-07-25 RX ADMIN — LIDOCAINE 5% 1 PATCH: 5 CREAM TOPICAL at 17:45

## 2024-07-25 RX ADMIN — LEVALBUTEROL HYDROCHLORIDE 0.63 MILLIGRAM(S): 0.31 SOLUTION RESPIRATORY (INHALATION) at 17:10

## 2024-07-25 RX ADMIN — LIDOCAINE 5% 1 PATCH: 5 CREAM TOPICAL at 19:00

## 2024-07-25 RX ADMIN — LEVALBUTEROL HYDROCHLORIDE 0.63 MILLIGRAM(S): 0.31 SOLUTION RESPIRATORY (INHALATION) at 22:10

## 2024-07-25 RX ADMIN — Medication 400 MILLIGRAM(S): at 23:04

## 2024-07-25 RX ADMIN — Medication 4 MILLILITER(S): at 22:11

## 2024-07-25 RX ADMIN — DEXTROSE MONOHYDRATE, SODIUM CHLORIDE, SODIUM LACTATE, CALCIUM CHLORIDE, MAGNESIUM CHLORIDE 30 MILLILITER(S): 1.5; 538; 448; 18.4; 5.08 SOLUTION INTRAPERITONEAL at 19:14

## 2024-07-25 RX ADMIN — Medication 30 MILLILITER(S): at 19:14

## 2024-07-25 RX ADMIN — Medication 0.5 MILLIGRAM(S): at 15:58

## 2024-07-25 RX ADMIN — POTASSIUM CHLORIDE 20 MILLIEQUIVALENT(S): 1500 TABLET, EXTENDED RELEASE ORAL at 21:14

## 2024-07-25 RX ADMIN — DEXTROSE MONOHYDRATE, SODIUM CHLORIDE, SODIUM LACTATE, CALCIUM CHLORIDE, MAGNESIUM CHLORIDE 30 MILLILITER(S): 1.5; 538; 448; 18.4; 5.08 SOLUTION INTRAPERITONEAL at 15:59

## 2024-07-25 RX ADMIN — Medication 30 MILLILITER(S): at 16:18

## 2024-07-25 RX ADMIN — Medication 1000 MILLIGRAM(S): at 18:36

## 2024-07-25 RX ADMIN — SENNOSIDES 2 TABLET(S): 8.6 TABLET ORAL at 22:04

## 2024-07-25 RX ADMIN — DORNASE ALFA 2.5 MILLIGRAM(S): 1 SOLUTION RESPIRATORY (INHALATION) at 22:52

## 2024-07-25 RX ADMIN — Medication 12.5 MILLIGRAM(S): at 18:45

## 2024-07-25 RX ADMIN — Medication 100 GRAM(S): at 21:14

## 2024-07-25 RX ADMIN — Medication 400 MILLIGRAM(S): at 17:46

## 2024-07-25 RX ADMIN — HEPARIN SODIUM 5000 UNIT(S): 1000 INJECTION, SOLUTION INTRAVENOUS; SUBCUTANEOUS at 22:04

## 2024-07-25 NOTE — ASU PREOP CHECKLIST - 3.
Per Dr. Cunha medications to be given before IR and anesthesia does no need to sign consents beforehand. GREEN MEDICATION LIST DONE

## 2024-07-25 NOTE — BRIEF OPERATIVE NOTE - CONDITION POST OP
Chief complaint: LBP  Chief Complaint   Patient presents with    Follow-up         Vitals:  Visit Vitals  /66 (BP Location: RUE - Right upper extremity, Patient Position: Sitting)   Pulse 76   Temp 97.4 °F (36.3 °C) (Temporal)   SpO2 95%       HISTORY OF PRESENT ILLNESS       61M - f/u appt.  History of lumbar spondylosis, degenerative disc disease, post-laminectomy syndrome, L4-S1 laminectomy, sacroiliac joint mediated lower back, buttock and leg pain    Epidural steroid injection earlier in 2023 provided some relief of leg pain symptoms.  Axial facet mediated lower back pain persisted     2/7/23 did Rt lumbar RFA. Initially this significantly helped for a week. patient had a mechanical fall, no LOC, no contact directly on lumbar spine. However since the fall, he had worsening RT lower back pain     4/2024 did caudal epidural steroid injection--> lower back pain today is between 1 to 2/10, nontender lumbar spine.  Improve lumbar sacral spine range of motion    Carmelo however had an episode of Bell's palsy last week which is gradually improving.  He will be following up with Neurology    Performs home PT as tolerated regularly/daily       CT LUMBAR SPINE WITH AND WITHOUT IV CONTRAST    INDICATION: Lumbar postlaminectomy syndrome, Radiculopathy, lumbar region,  Spondylosis without myelopathy or radiculopathy, lumbar region    COMPARISON: Prior studies with the most recent performed on 4/10/2023.    TECHNIQUE: CT lumbar spine was performed without and with IV contrast. 100  cc of Omnipaque 300 contrast administered intravenously. Images were  obtained in the axial plane. Sagittal and coronal reformats obtained.    FINDINGS:    5 lumbar vertebral bodies identified.    Posterior laminectomy noted at L4 and partially at L3. Postsurgical  induration/fluid identified within the posterior fat extending from L1  through L5.    Mild levocurvature of the lumbar spine, which may in part be positional.  Mild (grade 1)  retrolisthesis of T12 on L1, L1 and L2, and L2 on L3. Mild  (grade 1) anterolisthesis of L3 on L4. Preservation of vertebral body  heights, without evidence for vertebral body compression fractures.  Pedicles are intact. No facet dislocations. Left-sided pars defects noted  at L3 and L5. Disc space heights are preserved. Vacuum phenomenon noted at  L3-4. Prevertebral soft tissues are unremarkable. No osteolytic or  osteoblastic lesions are noted. Postcontrast sequences do not demonstrate  any abnormal areas of enhancement.    Arterial vascular calcifications. Mild degenerative changes of bilateral SI  joints.    Evaluation of the thecal sac is limited on this study.    T11-12: Only partially visualized on the axial and sagittal views. The  spinal canal is widely patent. Limited evaluation of bilateral neural  foramina.    T12-L1: The spinal canal is widely patent. Bilateral neural foramina are  patent.    L1-2: Bilateral ligamentum flavum thickening. Suggestion of a mild  posterior broad-based disc bulge. The thecal sac is grossly patent.  Bilateral neural foramina are patent.    L2-3: Mild posterior broad-based disc bulge. The thecal sac is grossly  patent. Suggestion of mild right-sided neural foraminal narrowing. The left  neural foramen is widely patent.    L3-4: Mild posterior broad-based disc bulge. Right-sided facet degenerative  change. The thecal sac is grossly patent. Severe narrowing of bilateral  neural foramina.    L4-5: Posterior broad-based disc bulge. The thecal sac is grossly patent.  Moderate to severe narrowing of bilateral neural foramina.    L5-S1: Mild posterior broad-based disc bulge. The thecal sac is grossly  patent. Suggestion of mild bilateral neural foraminal narrowing.    Impression:    IMPRESSION:    1. Postsurgical changes of the lumbar spine, as described above.    2. Multilevel degenerative changes of the lumbar spine, as described above.  No thecal sac narrowing.    3. Neural  foraminal narrowing noted at the L2-3, L3-4, L4-5, and L5-S1  levels.    4. No abnormal areas of enhancement identified.              MRI Lumbar spine   EXAM: MRI LUMBAR SPINE WO CONTRAST     HISTORY: Low back pain, > 6 wks.     COMPARISON: 2021.     TECHNIQUE:  Multiplanar, multisequence MR imaging was performed of the  lumbar spine.      FINDINGS:     There are postoperative changes from L4-L5 and L5-S1 laminectomy.     Spinal numberin nonrib-bearing lumbar type vertebrae are designated  L1-L5.      Alignment: Normal.      Vertebrae: No pathologic marrow lesions are identified.     The conus medullaris terminates at L1 and is unremarkable.     L1-L2 and L2-L3: No significant degenerative changes.     L3-L4: Moderate central disc protrusion with underlying disc bulge and  annular fissure, moderate facet arthropathy and mild left facet  arthropathy, with moderate to severe right and moderate left neural  foraminal narrowing. The spinal canal is patent.     L4-L5: Mild central disc protrusion with annular fissure and underlying  disc bulge, and mild facet arthropathy. There is mild to moderate bilateral  neural foraminal narrowing. The spinal canal is patent.     L5-S1: Minimal disc bulge and facet arthropathy. The spinal canal and  neural foramina are patent.     There is mild urinary bladder distention.        IMPRESSION:      1.  Disc bulge/protrusion at L3-L4 has increased since the previous MRI. In  combination with facet arthropathy, results in moderate to severe right  neural foraminal narrowing and moderate left neural foraminal narrowing at  that level.  2.  There is stable mild to moderate foraminal narrowing at L4-L5 due to  disc bulge and facet arthropathy.              Other significant problems:  Patient Active Problem List    Diagnosis Date Noted    Atrial fibrillation (CMD) 2023     Priority: High    Afib (CMD) 2023     Priority: Low    S/P total right hip arthroplasty 2022      Priority: Low    Arthritis of left hip 05/24/2022     Priority: Low    Vasculogenic erectile dysfunction 12/01/2020     Priority: Low    Lumbar radiculopathy 11/03/2020     Priority: Low    Lumbar spondylosis 11/03/2020     Priority: Low    Chronic pain of both shoulders 11/03/2020     Priority: Low    Anxiety 11/12/2019     Priority: Low    Sternal wound dehiscence 07/08/2019     Priority: Low    S/P CABG x 4 05/31/2019     Priority: Low    Dry eyes, bilateral      Priority: Low    Bilateral primary osteoarthritis of knee 10/09/2018     Priority: Low    Ulnar neuropathy at elbow of right upper extremity 10/06/2018     Priority: Low    Pain of right upper extremity 10/06/2018     Priority: Low    Numbness and tingling in right hand 08/30/2018     Priority: Low    Adjustment disorder with mixed anxiety and depressed mood 11/17/2017     Priority: Low    Complete tear of right rotator cuff 10/09/2017     Priority: Low    Obstructive sleep apnea on CPAP 02/27/2017     Priority: Low    NHL (non-Hodgkin's lymphoma) (CMD) 10/07/2015     Priority: Low     NHL 2000 treated in chemo x6 months. Developed abnormal rhythm. PET april 2001 and was cleared      History of TIA (transient ischemic attack) 01/21/2015     Priority: Low    Stented coronary artery 03/14/2013     Priority: Low     11/16/2011 Stent in the LAD 3.0x15mm [place] GENTRY stent in the Proximal LAD ( maker 80%)  2/24/2012 Stent Proximal RCA 4.0x18mm [facility]ience at [facility] in [facility].      Diabetes mellitus (CMD)      Priority: Low     insulin-dependent      Essential (primary) hypertension      Priority: Low    Other hyperlipidemia      Priority: Low    Arterial ischemic stroke, vertebrobasilar, brainstem, remote, resolved      Priority: Low    Malignant neoplasm (CMD)      Priority: Low     NHL      CAD (coronary artery disease)      Priority: Low     has stents x 2-       Pneumonitis      Priority: Low    Malignant neoplasm (CMD)       Priority: Low     NHL      Psoriasis      Priority: Low       PAST MEDICAL, FAMILY AND SOCIAL HISTORY     Medications:  Current Outpatient Medications   Medication Sig Dispense Refill    simvastatin (ZOCOR) 20 MG tablet TAKE 3 TABLETS BY MOUTH NIGHTLY 270 tablet 2    furosemide (LASIX) 20 MG tablet Take 3 tablets by mouth once daily 180 tablet 0    clopidogrel (Plavix) 75 MG tablet Take 1 tablet by mouth daily. 90 tablet 3    dulaglutide (Trulicity) 1.5 MG/0.5ML pen-injector Inject 1.5 mg into the skin every 7 days.      aspirin 81 MG chewable tablet Chew 1 tablet by mouth in the morning and 1 tablet in the evening. 60 tablet 0    HYDROcodone-acetaminophen (NORCO)  MG per tablet Take one-HALF to 1 tablet by mouth every 4 hours as needed for Pain. (Patient taking differently: Take 0.5-1 tablets by mouth every 4 hours as needed for Pain (severe pain).) 40 tablet 0    Skin Protectants, Misc. (Absorbase) ointment APPLY 1 APPLICATION TOPICALLY 4 TIMES DAILY FOR 30 DAYS      metoPROLOL tartrate (LOPRESSOR) 50 MG tablet Take 1 tablet by mouth every 12 hours. 180 tablet 1    naftifine 2 % cream APPLY ONE-HALF GRAM TOPICALLY TO FEET TWICE DAILY      diclofenac (VOLTAREN) 1 % gel Apply 1 g topically as needed.      baclofen (LIORESAL) 10 MG tablet Take 1 tablet by mouth 2 times daily as needed (for mx spasm). 1 cap hs for 4 days.  BID if tolerated, if not return to 1 qhs.  During day , 1/2 first and evaluate. 60 tablet 2    insulin aspart (NovoLOG) 100 UNIT/ML injectable solution Inject 5 units into the skin 3 times daily before meals. Use sliding scale: if BG<100 - 2 units, if BG>150 + 2 units, if BG >200 + 4 units, if BG> 250 + 6 units.      acetaminophen (TYLENOL) 325 MG tablet Take 2 tablets by mouth every 4 hours as needed for Pain or Fever.      dapagliflozin (Farxiga) 10 MG tablet Take 20 mg by mouth.       ZINC SULFATE PO Take 1 tablet by mouth daily.       gabapentin (NEURONTIN) 300 MG capsule Take 1 capsule by  mouth 2 times daily. 180 capsule 3    folic acid (FOLATE) 1 MG tablet Take 1 tablet by mouth daily. 90 tablet 0    sertraline (ZOLOFT) 50 MG tablet Take 1 tablet by mouth daily. Keep appointment 8/10/2020 (Patient taking differently: Take 50 mg by mouth nightly. Keep appointment 8/10/2020) 90 tablet 1    insulin detemir (LEVEMIR) 100 UNIT/ML injectable solution Inject 55 Units into the skin every 12 hours. 40 mL 3    cholecalciferol (VITAMIN D) 25 mcg (1,000 units) tablet Take 50 mcg by mouth daily.      vitamin B-12 (CYANOCOBALAMIN) 1000 MCG tablet Take 1,000 mcg by mouth daily.      Insulin Pen Needle 32G X 4 MM Misc Use to inject victoza once daily. 100 each 3    DISPENSE 1 Device by Other route daily. Glucose Meter 1 each 0    Multiple Vitamins-Minerals (CENTRUM SILVER) tablet Take 1 tablet by mouth daily.       Current Facility-Administered Medications   Medication Dose Route Frequency Provider Last Rate Last Admin    ondansetron (ZOFRAN) injection 4 mg  4 mg Intravenous Once Mitchell Berry MD           Allergies:  ALLERGIES:   Allergen Reactions    Hibiclens RASH    Phenytoin RASH    Dilantin RASH       Past Medical  History/Surgeries:  Past Medical History:   Diagnosis Date    Adjustment disorder     Anxiety     Arterial ischemic stroke, vertebrobasilar, brainstem, remote, resolved 2008    tia's    Arthritis     Bronchitis     CAD (coronary artery disease) 2011    has stents x 2- LAD, prox RCA    Cataracts, both eyes     Cerebral infarction (CMD)     Chronic back pain     Cryptogenic stroke (CMD)     Diabetes mellitus (CMD) 1996    insulin-dependent    Dry eyes, bilateral     Essential (primary) hypertension 1996    Former smoker     Fracture     right foot    Hyperlipidemia 1996    Lumbar radiculopathy     Lumbar spondylosis     Lumbar stenosis with neurogenic claudication     Lymphoma (CMD)     Malignant neoplasm (CMD) 2000    NHL    Mild nonproliferative diabetic retinopathy (CMD)     Osteoarthritis of left  knee     Pneumonia     3x    Psoriasis 1980    S/P CABG x 2 2019    Sleep apnea     does not use CPAP anymore    Stroke (CMD)     Wears glasses        Past Surgical History:   Procedure Laterality Date    Back surgery  2018    neck-disk c-5 and c-6 removal    Cardiac catherization  2019    3 vessel CAD    Cardiac surgery      Colonoscopy  10/14/2019    Cyst removal  2002    R posterior shoulder    Foot surgery      right foot large toe infection    Hip arthroplasty Left 2022    Knee arthroscopy w/ meniscal repair      R knee    Laminectomy,lumbar  2021    L3-L5    Portacath placement  -inserted    -removed    Ptca      angio with stents    Ptca with stent      Rotator cuff repair  2017    right shoulder    Service to gastroenterology         Family History:  Family History   Problem Relation Age of Onset    Diabetes Mother     Cancer Mother         breast    Peripheral Vascular Disease Mother         Parkinsons    Heart Mother     Depression Mother     Hyperlipidemia Mother     Hypertension Mother     Heart disease Father         CABG x 4 in     Diabetes Father     Hypertension Father     Hyperlipidemia Father     Anxiety disorder Father     Heart Father         s/p CABG    Cancer Maternal Grandmother         colon    Diabetes Maternal Grandmother     Heart disease Maternal Grandfather          MI at 54 y/o    Heart Maternal Grandfather     Heart Paternal Grandmother     Hypertension Paternal Grandmother     Diabetes Paternal Grandmother     Cataracts Paternal Grandmother         CE IOL    Stroke Paternal Grandfather     Glaucoma Maternal Great-Grandmother        Social History:  Social History     Tobacco Use    Smoking status: Former     Current packs/day: 0.00     Types: Cigarettes     Quit date: 1988     Years since quittin.8    Smokeless tobacco: Never   Substance Use Topics    Alcohol use: Yes     Comment: rare       REVIEW OF SYSTEMS     Review of  Systems   Constitutional: Negative.    HENT: Negative.     Eyes: Negative.    Respiratory: Negative.     Cardiovascular: Negative.    Gastrointestinal: Negative.    Endocrine: Negative.    Genitourinary: Negative.    Musculoskeletal: Negative.    Skin: Negative.    Allergic/Immunologic: Negative.    Neurological: Negative.    Hematological: Negative.    Psychiatric/Behavioral: Negative.     All other systems reviewed and are negative.      PHYSICAL EXAM     Physical Exam  Vitals and nursing note reviewed.   Constitutional:       Appearance: Normal appearance. He is obese.   HENT:      Head: Normocephalic and atraumatic.      Mouth/Throat:      Mouth: Mucous membranes are moist.   Eyes:      Pupils: Pupils are equal, round, and reactive to light.   Cardiovascular:      Rate and Rhythm: Tachycardia present.      Pulses: Normal pulses.   Pulmonary:      Effort: Pulmonary effort is normal.   Abdominal:      Palpations: Abdomen is soft.   Musculoskeletal:      Comments: Nontender lumbar spine  Improved range of motion lumbosacral  Very minimum tenderness of the lumbar facet loading on the right   Straight leg raise test did not worsened back pain    Motor strength intact lower extremity at rest. Intermittently decreased   Dec sensation intermittently in RLE   Utilizes a cane to assist with ambulation     nontender left lateral chest wall area     Left facial droop.  Able to speak and eat without difficulty   Skin:     General: Skin is warm.   Neurological:      General: No focal deficit present.      Mental Status: He is alert and oriented to person, place, and time.   Psychiatric:         Mood and Affect: Mood normal.         Behavior: Behavior normal.         ASSESSMENT/PLAN       61M - f/u appt.  Hx of Lumbar Spondylosis, DJD, post-laminectomy syndrome (L4-S1 laminectomy), mild sacroiliac joint mediated lower back, buttock and intermittent RLE leg pain.  He has waxing waning axial lower back and radicular pain.    MRI  lumbar, CT lumbar spine reviewed with the patient  Continuing home physical therapy and stretching as tolerated     # lumbar radiculopathy, post-laminectomy syndrome, lumbar spondylosis  -Epidural Steroid injection earlier in 2023 provided good relief of leg pain and 30% relief of axial facet mediated pain.      2/7/23 did Rt lumbar RFA. Initially this significantly helped for a week. patient had a mechanical fall, no LOC, no contact directly on lumbar spine. However since the fall, he had worsening RT lower back pain     4/2024 did caudal epidural steroid injection--> lower back pain today is between 1 to 2/10, nontender lumbar spine.  Improve lumbar sacral spine range of motion    Carmelo however had an episode of Bell's palsy last week which is gradually improving.  He will be following up with Neurology    He will be traveling to [facility] next month.  Continue p.r.n. baclofen.    Plan to follow up in 3 months     #Medications   -tramadol PRN earlier this year. Currently does not need as injections helped        Stable

## 2024-07-25 NOTE — BRIEF OPERATIVE NOTE - NSICDXBRIEFPROCEDURE_GEN_ALL_CORE_FT
PROCEDURES:  Flexible bronchoscopy 25-Jul-2024 15:28:55  Michelle Muñoz  Robot-assisted thoracoscopic wedge resection of lung 25-Jul-2024 15:29:30  Michelle Muñoz

## 2024-07-25 NOTE — PROGRESS NOTE ADULT - SUBJECTIVE AND OBJECTIVE BOX
CHIEF COMPLAINT: FOLLOW UP IN ICU FOR POSTOPERATIVE CARE OF PATIENT WHO IS S/P LUNG RESECTION      PROCEDURES: R VATS, robotic RUL wedge resection, MLND 25-Jul-2024       ISSUES:   Lung nodule  Post op pain  Chest tube in place  s/p L Nephrectomy -- kidney donor  Hx of lower extremity DVT on Xarelto  Hx of PE on Xarelto  Hx of SVT s/p ablation   Ventral septal defect  CAD  Orthostatic hypotension    INTERVAL EVENTS:   OR today. Extubated in OR. Transferred to CTICU.      HISTORY:   Patient reports moderate pain at chest wall incision sites which is worse with coughing and deep breathing without associated fever or dyspnea. Pain is improved with use of pain meds.     PHYSICAL EXAM:   Gen: Comfortable, No acute distress  Eyes: Sclera white, Conjunctiva normal, Eyelids normal, Pupils symmetrical   ENT: Mucous membranes moist,  ,  ,    Neck: Trachea midline,  ,  ,  ,  ,  ,    CV: Rate regular, Rhythm regular,  ,  ,    Resp: Breath sounds clear, No accessory muscles use, R chest tube in place,  ,    Abd: Soft, Non-distended, Non-tender,   ,  ,  ,    Skin: Warm, No peripheral edema of lower extremities,  ,    : No turner  Neuro: Moving all 4 extremities,    Psych: A&Ox3      ASSESSMENT AND PLAN:     NEURO:  Post-operative Pain - Stable. Pain control with PCA and Tylenol IV PRN.       RESPIRATORY:  Hypoxia - Wean nasal cannula for goal O2sat above 92. Obtain CXR. Incentive spirometry. Chest PT and frequent suctioning. Continue bronchodilators. OOB to chair & ambulate w/ assistance. Continuous pulse oximetry for support & to prevent decompensation.       CARDIOVASCULAR:  Hemodynamically stable - Not on pressors. Continue hemodynamic monitoring.  Telemetry (medical test) - Reviewed by me today independently. Normal sinus rhythm.     CAD - stable.    Hx of SVT - stable.   - start metoprolol PO  - Telemetry monitoring       History of DVT and Pulmonary Embolism - stable.   - Hold anticoagulation for now  - Start heparin 12h post op if chest tubes without sanguinous output     Orthostatic hypotension - stable.  - allow time for reacclimatizing before getting out of bed or ambulating  - Monitor BP      RENAL:  S/p L Nephrectomy (kidney donor) – Stable. Renally dose medications. Monitor for hyperkalemia and uremia. Avoid nephrotoxic medications. Monitor IOs and electrolytes.              GASTROINTESTINAL:  GI prophylaxis not indicated  Zofran and Reglan IV PRN for nausea  Regular consistency diet           HEMATOLOGIC:  No signs of active bleeding. Monitor Hgb in CBC in AM  DVT prophylaxis with heparin subQ               INFECTIOUS DISEASE:  All surgical sites appear clean. No signs of active infection. Will monitor for fever and leukocytosis.       ENDOCRINE:  Stable – Monitor glucose fingersticks for goal 120-180.               ONCOLOGY:  Lung nodule - Improved. S/P resection. Follow up final pathology.  - Chest tube – Pleurevac regulated suctioning. Monitor chest tube output.        Pertinent clinical, laboratory, radiographic, hemodynamic, echocardiographic, respiratory data, microbiologic data and chart were reviewed by myself and analyzed frequently throughout the course of the day and night by myself.    Plan discussed at length with the CTICU staff and Attending CT Surgeon -   Dr Dia Edmonds.       Patient's status was discussed with patient at bedside.       	  I have spent 50 minutes of time with this patient monitoring hemodynamic status, respiratory status, and coordinating care in the ICU.    ________________________________________________      _________________________  VITAL SIGNS:  Vital Signs Last 24 Hrs  T(C): 36.8 (25 Jul 2024 10:14), Max: 36.8 (25 Jul 2024 07:42)  T(F): 98.3 (25 Jul 2024 10:14), Max: 98.3 (25 Jul 2024 10:14)  HR: 50 (25 Jul 2024 10:51) (50 - 66)  BP: 114/65 (25 Jul 2024 10:51) (103/74 - 123/72)  BP(mean): 80 (25 Jul 2024 10:51) (80 - 88)  RR: 16 (25 Jul 2024 10:51) (14 - 16)  SpO2: 99% (25 Jul 2024 10:51) (97% - 99%)    Parameters below as of 25 Jul 2024 10:14  Patient On (Oxygen Delivery Method): room air      I/Os:   I&O's Detail          MEDICATIONS:  MEDICATIONS  (STANDING):  dornase milena Solution 2.5 milliGRAM(s) Inhalation every 12 hours  heparin   Injectable 5000 Unit(s) SubCutaneous every 8 hours  HYDROmorphone  Injectable 0.5 milliGRAM(s) IV Push once  lactated ringers. 1000 milliLiter(s) (30 mL/Hr) IV Continuous <Continuous>  lidocaine   4% Patch 1 Patch Transdermal <User Schedule>  metoprolol tartrate 12.5 milliGRAM(s) Oral <User Schedule>  polyethylene glycol 3350 17 Gram(s) Oral daily  senna 2 Tablet(s) Oral at bedtime  sodium chloride 3%  Inhalation 4 milliLiter(s) Inhalation every 12 hours    MEDICATIONS  (PRN):      LABS:  Pre-op Laboratory data was independently reviewed by me today.   7/18/24 - Hgb 11.8, Cr 0.95                    RADIOLOGY:   Radiology images were independently reviewed by me today. Reports were reviewed by me today.    Post op CXR 7/25/24 - Lungs clear. Chest tube in place. No pneumothorax.    	    CHIEF COMPLAINT: FOLLOW UP IN ICU FOR POSTOPERATIVE CARE OF PATIENT WHO IS S/P LUNG RESECTION      PROCEDURES: R VATS, robotic RUL wedge resection, MLND 25-Jul-2024       ISSUES:   Lung nodule  Post op pain  Chest tube in place  s/p L Nephrectomy -- kidney donor  Hx of lower extremity DVT on Xarelto  Hx of PE on Xarelto  Hx of SVT s/p ablation   Ventral septal defect  CAD  Orthostatic hypotension    INTERVAL EVENTS:   OR today. Extubated in OR. Transferred to CTICU.      HISTORY:   Patient reports moderate pain at chest wall incision sites which is worse with coughing and deep breathing without associated fever or dyspnea. Pain is improved with use of pain meds.     PHYSICAL EXAM:   Gen: Comfortable, No acute distress  Eyes: Sclera white, Conjunctiva normal, Eyelids normal, Pupils symmetrical   ENT: Mucous membranes moist,  ,  ,    Neck: Trachea midline,  ,  ,  ,  ,  ,    CV: Rate regular, Rhythm regular,  ,  ,    Resp: Breath sounds clear, No accessory muscles use, R chest tube in place,  ,    Abd: Soft, Non-distended, Non-tender,   ,  ,  ,    Skin: Warm, No peripheral edema of lower extremities,  ,    : No turner  Neuro: Moving all 4 extremities,    Psych: A&Ox3      ASSESSMENT AND PLAN:     NEURO:  Post-operative Pain - Stable. Pain control with PCA and Tylenol IV PRN.       RESPIRATORY:  Hypoxia - Wean nasal cannula for goal O2sat above 92. Obtain CXR. Incentive spirometry. Chest PT and frequent suctioning. Continue bronchodilators. OOB to chair & ambulate w/ assistance. Continuous pulse oximetry for support & to prevent decompensation.       CARDIOVASCULAR:  Hemodynamically stable - Not on pressors. Continue hemodynamic monitoring.  Telemetry (medical test) - Reviewed by me today independently. Normal sinus rhythm.     CAD - stable.    Hx of SVT - stable.   - start metoprolol PO  - Telemetry monitoring       History of DVT and Pulmonary Embolism - stable.   - Hold anticoagulation for now  - Start heparin 12h post op if chest tubes without sanguinous output     Orthostatic hypotension - stable.  - allow time for reacclimatizing before getting out of bed or ambulating  - Monitor BP      RENAL:  S/p L Nephrectomy (kidney donor) – Stable. Renally dose medications. Monitor for hyperkalemia and uremia. Avoid nephrotoxic medications. Monitor IOs and electrolytes.              GASTROINTESTINAL:  GI prophylaxis not indicated  Zofran and Reglan IV PRN for nausea  Regular consistency diet           HEMATOLOGIC:  No signs of active bleeding. Monitor Hgb in CBC in AM  DVT prophylaxis with heparin subQ               INFECTIOUS DISEASE:  All surgical sites appear clean. No signs of active infection. Will monitor for fever and leukocytosis.       ENDOCRINE:  Stable – Monitor glucose fingersticks for goal 120-180.               ONCOLOGY:  Lung nodule - Improved. S/P resection. Follow up final pathology.  - Chest tube – Pleurevac regulated suctioning. Monitor chest tube output.        Pertinent clinical, laboratory, radiographic, hemodynamic, echocardiographic, respiratory data, microbiologic data and chart were reviewed by myself and analyzed frequently throughout the course of the day and night by myself.    Plan discussed at length with the CTICU staff and Attending CT Surgeon -   Dr Dia Edmonds.       Patient's status was discussed with patient at bedside.       	  I have spent 50 minutes of time with this patient monitoring hemodynamic status, respiratory status, and coordinating care in the ICU.    ________________________________________________      _________________________  VITAL SIGNS:  Vital Signs Last 24 Hrs  T(C): 36.8 (25 Jul 2024 10:14), Max: 36.8 (25 Jul 2024 07:42)  T(F): 98.3 (25 Jul 2024 10:14), Max: 98.3 (25 Jul 2024 10:14)  HR: 50 (25 Jul 2024 10:51) (50 - 66)  BP: 114/65 (25 Jul 2024 10:51) (103/74 - 123/72)  BP(mean): 80 (25 Jul 2024 10:51) (80 - 88)  RR: 16 (25 Jul 2024 10:51) (14 - 16)  SpO2: 99% (25 Jul 2024 10:51) (97% - 99%)    Parameters below as of 25 Jul 2024 10:14  Patient On (Oxygen Delivery Method): room air      I/Os:   I&O's Detail          MEDICATIONS:  MEDICATIONS  (STANDING):  dornase milena Solution 2.5 milliGRAM(s) Inhalation every 12 hours  heparin   Injectable 5000 Unit(s) SubCutaneous every 8 hours  HYDROmorphone  Injectable 0.5 milliGRAM(s) IV Push once  lactated ringers. 1000 milliLiter(s) (30 mL/Hr) IV Continuous <Continuous>  lidocaine   4% Patch 1 Patch Transdermal <User Schedule>  metoprolol tartrate 12.5 milliGRAM(s) Oral <User Schedule>  polyethylene glycol 3350 17 Gram(s) Oral daily  senna 2 Tablet(s) Oral at bedtime  sodium chloride 3%  Inhalation 4 milliLiter(s) Inhalation every 12 hours    MEDICATIONS  (PRN):      LABS:  Pre-op Laboratory data was independently reviewed by me today.   7/18/24 - Hgb 11.8, Cr 0.95                    RADIOLOGY:   Radiology images were independently reviewed by me today. Reports were reviewed by me today.    Post op CXR 7/25/24 - Lungs clear. Chest tube in place. No pneumothorax. Subcutaneous emphysema of neck

## 2024-07-26 LAB
ANION GAP SERPL CALC-SCNC: 12 MMOL/L — SIGNIFICANT CHANGE UP (ref 7–14)
APTT BLD: 25.7 SEC — SIGNIFICANT CHANGE UP (ref 24.5–35.6)
APTT BLD: 27.1 SEC — SIGNIFICANT CHANGE UP (ref 24.5–35.6)
APTT BLD: 33.4 SEC — SIGNIFICANT CHANGE UP (ref 24.5–35.6)
BASOPHILS # BLD AUTO: 0.01 K/UL — SIGNIFICANT CHANGE UP (ref 0–0.2)
BASOPHILS NFR BLD AUTO: 0.1 % — SIGNIFICANT CHANGE UP (ref 0–2)
BLD GP AB SCN SERPL QL: NEGATIVE — SIGNIFICANT CHANGE UP
BUN SERPL-MCNC: 19 MG/DL — SIGNIFICANT CHANGE UP (ref 7–23)
CALCIUM SERPL-MCNC: 8.3 MG/DL — LOW (ref 8.4–10.5)
CHLORIDE SERPL-SCNC: 104 MMOL/L — SIGNIFICANT CHANGE UP (ref 98–107)
CO2 SERPL-SCNC: 21 MMOL/L — LOW (ref 22–31)
CREAT SERPL-MCNC: 0.85 MG/DL — SIGNIFICANT CHANGE UP (ref 0.5–1.3)
EGFR: 74 ML/MIN/1.73M2 — SIGNIFICANT CHANGE UP
EOSINOPHIL # BLD AUTO: 0 K/UL — SIGNIFICANT CHANGE UP (ref 0–0.5)
EOSINOPHIL NFR BLD AUTO: 0 % — SIGNIFICANT CHANGE UP (ref 0–6)
GLUCOSE SERPL-MCNC: 172 MG/DL — HIGH (ref 70–99)
HCT VFR BLD CALC: 28.9 % — LOW (ref 34.5–45)
HCT VFR BLD CALC: 30.4 % — LOW (ref 34.5–45)
HCT VFR BLD CALC: 32.7 % — LOW (ref 34.5–45)
HGB BLD-MCNC: 10 G/DL — LOW (ref 11.5–15.5)
HGB BLD-MCNC: 10.2 G/DL — LOW (ref 11.5–15.5)
HGB BLD-MCNC: 9.5 G/DL — LOW (ref 11.5–15.5)
IANC: 11.77 K/UL — HIGH (ref 1.8–7.4)
IMM GRANULOCYTES NFR BLD AUTO: 0.3 % — SIGNIFICANT CHANGE UP (ref 0–0.9)
INR BLD: 0.94 RATIO — SIGNIFICANT CHANGE UP (ref 0.85–1.18)
LYMPHOCYTES # BLD AUTO: 1.08 K/UL — SIGNIFICANT CHANGE UP (ref 1–3.3)
LYMPHOCYTES # BLD AUTO: 8.1 % — LOW (ref 13–44)
MAGNESIUM SERPL-MCNC: 2.2 MG/DL — SIGNIFICANT CHANGE UP (ref 1.6–2.6)
MCHC RBC-ENTMCNC: 26.9 PG — LOW (ref 27–34)
MCHC RBC-ENTMCNC: 27.8 PG — SIGNIFICANT CHANGE UP (ref 27–34)
MCHC RBC-ENTMCNC: 28.2 PG — SIGNIFICANT CHANGE UP (ref 27–34)
MCHC RBC-ENTMCNC: 31.2 GM/DL — LOW (ref 32–36)
MCHC RBC-ENTMCNC: 32.9 GM/DL — SIGNIFICANT CHANGE UP (ref 32–36)
MCHC RBC-ENTMCNC: 32.9 GM/DL — SIGNIFICANT CHANGE UP (ref 32–36)
MCV RBC AUTO: 84.5 FL — SIGNIFICANT CHANGE UP (ref 80–100)
MCV RBC AUTO: 85.6 FL — SIGNIFICANT CHANGE UP (ref 80–100)
MCV RBC AUTO: 86.3 FL — SIGNIFICANT CHANGE UP (ref 80–100)
MONOCYTES # BLD AUTO: 0.38 K/UL — SIGNIFICANT CHANGE UP (ref 0–0.9)
MONOCYTES NFR BLD AUTO: 2.9 % — SIGNIFICANT CHANGE UP (ref 2–14)
NEUTROPHILS # BLD AUTO: 11.77 K/UL — HIGH (ref 1.8–7.4)
NEUTROPHILS NFR BLD AUTO: 88.6 % — HIGH (ref 43–77)
NRBC # BLD: 0 /100 WBCS — SIGNIFICANT CHANGE UP (ref 0–0)
NRBC # FLD: 0 K/UL — SIGNIFICANT CHANGE UP (ref 0–0)
PHOSPHATE SERPL-MCNC: 3.3 MG/DL — SIGNIFICANT CHANGE UP (ref 2.5–4.5)
PLATELET # BLD AUTO: 143 K/UL — LOW (ref 150–400)
PLATELET # BLD AUTO: 162 K/UL — SIGNIFICANT CHANGE UP (ref 150–400)
PLATELET # BLD AUTO: 164 K/UL — SIGNIFICANT CHANGE UP (ref 150–400)
POTASSIUM SERPL-MCNC: 4.7 MMOL/L — SIGNIFICANT CHANGE UP (ref 3.5–5.3)
POTASSIUM SERPL-SCNC: 4.7 MMOL/L — SIGNIFICANT CHANGE UP (ref 3.5–5.3)
PROTHROM AB SERPL-ACNC: 10.6 SEC — SIGNIFICANT CHANGE UP (ref 9.5–13)
RBC # BLD: 3.42 M/UL — LOW (ref 3.8–5.2)
RBC # BLD: 3.55 M/UL — LOW (ref 3.8–5.2)
RBC # BLD: 3.79 M/UL — LOW (ref 3.8–5.2)
RBC # FLD: 14.6 % — HIGH (ref 10.3–14.5)
RBC # FLD: 14.6 % — HIGH (ref 10.3–14.5)
RBC # FLD: 14.9 % — HIGH (ref 10.3–14.5)
RH IG SCN BLD-IMP: POSITIVE — SIGNIFICANT CHANGE UP
SODIUM SERPL-SCNC: 137 MMOL/L — SIGNIFICANT CHANGE UP (ref 135–145)
WBC # BLD: 12.05 K/UL — HIGH (ref 3.8–10.5)
WBC # BLD: 13.28 K/UL — HIGH (ref 3.8–10.5)
WBC # BLD: 13.44 K/UL — HIGH (ref 3.8–10.5)
WBC # FLD AUTO: 12.05 K/UL — HIGH (ref 3.8–10.5)
WBC # FLD AUTO: 13.28 K/UL — HIGH (ref 3.8–10.5)
WBC # FLD AUTO: 13.44 K/UL — HIGH (ref 3.8–10.5)

## 2024-07-26 PROCEDURE — 71045 X-RAY EXAM CHEST 1 VIEW: CPT | Mod: 26

## 2024-07-26 PROCEDURE — 99233 SBSQ HOSP IP/OBS HIGH 50: CPT

## 2024-07-26 RX ORDER — ACETAMINOPHEN 500 MG
650 TABLET ORAL EVERY 6 HOURS
Refills: 0 | Status: COMPLETED | OUTPATIENT
Start: 2024-07-26 | End: 2024-07-28

## 2024-07-26 RX ORDER — HYDROMORPHONE HCL IN 0.9% NACL 0.2 MG/ML
0.2 PLASTIC BAG, INJECTION (ML) INTRAVENOUS
Refills: 0 | Status: DISCONTINUED | OUTPATIENT
Start: 2024-07-26 | End: 2024-08-01

## 2024-07-26 RX ORDER — HEPARIN SODIUM 1000 [USP'U]/ML
400 INJECTION, SOLUTION INTRAVENOUS; SUBCUTANEOUS
Qty: 25000 | Refills: 0 | Status: DISCONTINUED | OUTPATIENT
Start: 2024-07-26 | End: 2024-07-28

## 2024-07-26 RX ORDER — OXYCODONE HYDROCHLORIDE 30 MG/1
5 TABLET ORAL
Refills: 0 | Status: DISCONTINUED | OUTPATIENT
Start: 2024-07-26 | End: 2024-08-01

## 2024-07-26 RX ORDER — OXYCODONE HYDROCHLORIDE 30 MG/1
2.5 TABLET ORAL
Refills: 0 | Status: DISCONTINUED | OUTPATIENT
Start: 2024-07-26 | End: 2024-08-01

## 2024-07-26 RX ORDER — ALBUMIN HUMAN 25 %
250 INTRAVENOUS SOLUTION INTRAVENOUS ONCE
Refills: 0 | Status: COMPLETED | OUTPATIENT
Start: 2024-07-26 | End: 2024-07-26

## 2024-07-26 RX ADMIN — LEVALBUTEROL HYDROCHLORIDE 0.63 MILLIGRAM(S): 0.31 SOLUTION RESPIRATORY (INHALATION) at 07:52

## 2024-07-26 RX ADMIN — Medication 650 MILLIGRAM(S): at 12:24

## 2024-07-26 RX ADMIN — Medication 650 MILLIGRAM(S): at 23:01

## 2024-07-26 RX ADMIN — LEVALBUTEROL HYDROCHLORIDE 0.63 MILLIGRAM(S): 0.31 SOLUTION RESPIRATORY (INHALATION) at 20:39

## 2024-07-26 RX ADMIN — Medication 400 MILLIGRAM(S): at 05:26

## 2024-07-26 RX ADMIN — DORNASE ALFA 2.5 MILLIGRAM(S): 1 SOLUTION RESPIRATORY (INHALATION) at 07:52

## 2024-07-26 RX ADMIN — HEPARIN SODIUM 4 UNIT(S)/HR: 1000 INJECTION, SOLUTION INTRAVENOUS; SUBCUTANEOUS at 17:15

## 2024-07-26 RX ADMIN — OXYCODONE HYDROCHLORIDE 5 MILLIGRAM(S): 30 TABLET ORAL at 21:10

## 2024-07-26 RX ADMIN — OXYCODONE HYDROCHLORIDE 2.5 MILLIGRAM(S): 30 TABLET ORAL at 14:16

## 2024-07-26 RX ADMIN — LIDOCAINE 5% 1 PATCH: 5 CREAM TOPICAL at 19:07

## 2024-07-26 RX ADMIN — DORNASE ALFA 2.5 MILLIGRAM(S): 1 SOLUTION RESPIRATORY (INHALATION) at 20:43

## 2024-07-26 RX ADMIN — Medication 4 MILLIGRAM(S): at 06:51

## 2024-07-26 RX ADMIN — Medication 12.5 MILLIGRAM(S): at 09:28

## 2024-07-26 RX ADMIN — HEPARIN SODIUM 5000 UNIT(S): 1000 INJECTION, SOLUTION INTRAVENOUS; SUBCUTANEOUS at 05:25

## 2024-07-26 RX ADMIN — OXYCODONE HYDROCHLORIDE 5 MILLIGRAM(S): 30 TABLET ORAL at 20:39

## 2024-07-26 RX ADMIN — HEPARIN SODIUM 5000 UNIT(S): 1000 INJECTION, SOLUTION INTRAVENOUS; SUBCUTANEOUS at 14:15

## 2024-07-26 RX ADMIN — LEVALBUTEROL HYDROCHLORIDE 0.63 MILLIGRAM(S): 0.31 SOLUTION RESPIRATORY (INHALATION) at 04:13

## 2024-07-26 RX ADMIN — Medication 17 GRAM(S): at 12:24

## 2024-07-26 RX ADMIN — LIDOCAINE 5% 1 PATCH: 5 CREAM TOPICAL at 18:22

## 2024-07-26 RX ADMIN — Medication 650 MILLIGRAM(S): at 13:05

## 2024-07-26 RX ADMIN — LEVALBUTEROL HYDROCHLORIDE 0.63 MILLIGRAM(S): 0.31 SOLUTION RESPIRATORY (INHALATION) at 14:52

## 2024-07-26 RX ADMIN — HEPARIN SODIUM 4 UNIT(S)/HR: 1000 INJECTION, SOLUTION INTRAVENOUS; SUBCUTANEOUS at 20:30

## 2024-07-26 RX ADMIN — Medication 125 MILLILITER(S): at 17:13

## 2024-07-26 RX ADMIN — Medication 30 MILLILITER(S): at 07:20

## 2024-07-26 RX ADMIN — Medication 4 MILLILITER(S): at 07:52

## 2024-07-26 RX ADMIN — Medication 12.5 MILLIGRAM(S): at 20:40

## 2024-07-26 RX ADMIN — OXYCODONE HYDROCHLORIDE 2.5 MILLIGRAM(S): 30 TABLET ORAL at 13:16

## 2024-07-26 RX ADMIN — LIDOCAINE 5% 1 PATCH: 5 CREAM TOPICAL at 04:52

## 2024-07-26 RX ADMIN — Medication 4 MILLILITER(S): at 20:39

## 2024-07-26 RX ADMIN — Medication 250 MILLILITER(S): at 00:05

## 2024-07-26 NOTE — CONSULT NOTE ADULT - ASSESSMENT
Assessment and Plan     1) H/o PE with RV strain :' no signs of RHF follows with cards for PHTN screening as pt at risk of CTEPH restarted on heparin     2) Lung mass s.p resection: f/u path and CT surgery recs     3) DVT PPX heparin

## 2024-07-26 NOTE — CONSULT NOTE ADULT - ASSESSMENT
PE/DVT  -undergoing care with Dr Ram of [facility]  -Positive Lupus AC  -PE with right heart strain  -outpatient Xarelto          6/8/24 CTA chest  1. Enlarging right upper lobe lung nodule suspicious for primary neoplasm. Thoracic surgical consultation recommended.   2. Dilated proximal aorta: Sinus of Valsalva 4.2 cm and proximal ascending thoracic aorta 4.2 cm, not significantly changed.   3. Enlarged main pulmonary artery suggesting pulmonary artery hypertension. 4. Stable small hiatal hernia with small distal  esophageal diverticulum.  7/3/24 PET CT  1. Revisualized RIGHT UPPER lobe rounded pulmonary nodule is without abnormal uptake. This is indeterminate for neoplasm and  continued surveillance is suggested with dedicated chest CT.   2. Otherwise, no evidence of FDG­avid malignancy.   Ms Jasso is a 69 year old female with history of PE with LAC positive on Xarelto here for surgical resection of enlarging right upper lobe pulmonary nodule. Hematology/Oncology consulted for further recommendations.     1. History of PE   - October 2020, LLE DVT with extensive PE with right heart strain   -undergoing care with Dr Ram of [facility]  -Positive Lupus AC, B2GP and ACL ab   -outpatient was on Xarelto   -unclear if true APLS however given positive LAC would have patient on therapeutic AC post op  - hep gtt if no contraindication from surgical team to maintain therapeutic PTT  given prior APLS labs positive high risk of thrombosis   - once optimized can place back on home Xarelto dose to follow up with Dr Ram     2. RUL Pulm Nodule   - s/p RUL wedge resection on 7/25/24   - suspicion for hamartoma   - follow up path results     Jose C Cerna MD   Hematology/Oncology   [facility]   [phone]

## 2024-07-26 NOTE — PROGRESS NOTE ADULT - SUBJECTIVE AND OBJECTIVE BOX
Anesthesia Pain Management Service    SUBJECTIVE: Patient is doing well with IV PCA and no significant problems reported. Patient is tolerating po diet.     Pain Scale Score	At rest: __0/10_ 	With Activity: ___ 	[X ] Refer to charted pain scores    THERAPY:    [ ] IV PCA Morphine		[ ] 5 mg/mL	[ ] 1 mg/mL  [X ] IV PCA Hydromorphone	[ ] 5 mg/mL	[X ] 1 mg/mL  [ ] IV PCA Fentanyl		[ ] 50 micrograms/mL    Demand dose __0.2_ lockout __6_ (minutes) Continuous Rate _0__ Total: _2__   mg used (in past 24 hrs)      MEDICATIONS  (STANDING):  acetaminophen     Tablet .. 650 milliGRAM(s) Oral every 6 hours  dornase milena Solution 2.5 milliGRAM(s) Inhalation every 12 hours  heparin   Injectable 5000 Unit(s) SubCutaneous every 8 hours  lactated ringers. 1000 milliLiter(s) (30 mL/Hr) IV Continuous <Continuous>  levalbuterol Inhalation 0.63 milliGRAM(s) Inhalation every 6 hours  lidocaine   4% Patch 1 Patch Transdermal <User Schedule>  metoprolol tartrate 12.5 milliGRAM(s) Oral <User Schedule>  polyethylene glycol 3350 17 Gram(s) Oral daily  senna 2 Tablet(s) Oral at bedtime  sodium chloride 3%  Inhalation 4 milliLiter(s) Inhalation every 12 hours    MEDICATIONS  (PRN):  HYDROmorphone  Injectable 0.2 milliGRAM(s) IV Push every 3 hours PRN Severe breakthrough Pain (7 - 10)  naloxone Injectable 0.1 milliGRAM(s) IV Push every 3 minutes PRN For ANY of the following changes in patient status:  A. RR LESS THAN 10 breaths per minute, B. Oxygen saturation LESS THAN 90%, C. Sedation score of 6  ondansetron Injectable 4 milliGRAM(s) IV Push every 6 hours PRN Nausea  oxyCODONE    IR 5 milliGRAM(s) Oral every 3 hours PRN Severe Pain (7 - 10)  oxyCODONE    IR 2.5 milliGRAM(s) Oral every 3 hours PRN Moderate Pain (4 - 6)      OBJECTIVE:  Patient is sitting up in chair with CT x1.    Sedation Score:	[ X] Alert	[ ] Drowsy 	[ ] Arousable	[ ] Asleep	[ ] Unresponsive    Side Effects:	[X ] None	[ ] Nausea	[ ] Vomiting	[ ] Pruritus  		[ ] Other:    Vital Signs Last 24 Hrs  T(C): 36.3 (26 Jul 2024 04:00), Max: 36.6 (25 Jul 2024 20:00)  T(F): 97.4 (26 Jul 2024 04:00), Max: 97.9 (26 Jul 2024 00:00)  HR: 76 (26 Jul 2024 10:00) (60 - 89)  BP: 95/72 (26 Jul 2024 10:00) (85/47 - 140/69)  BP(mean): 58 (26 Jul 2024 09:00) (58 - 95)  RR: 23 (26 Jul 2024 10:00) (10 - 24)  SpO2: 96% (26 Jul 2024 10:00) (91% - 100%)    Parameters below as of 26 Jul 2024 08:00  Patient On (Oxygen Delivery Method): room air        ASSESSMENT/ PLAN    Therapy to  be:	[ ] Continue   [ X] Discontinued   [X ] Change to prn Analgesics    Documentation and Verification of current medications:   [X] Done	[ ] Not done, not elligible    Comments: IV Dilaudid PCA discontinued. PRN Oral/IV opioids and/or Adjuvant non-opioid medication to be ordered at this point.    Progress Note written now but Patient was seen earlier.

## 2024-07-26 NOTE — DISCHARGE NOTE PROVIDER - NSDCFUADDAPPT_GEN_ALL_CORE_FT
Follow up with Dr. Edmonds in 2 weeks   Chest x-ray prior to appointment with Dr. Edmonds  Follow up with primary care provider in one week  Follow up with primary care provider in one week

## 2024-07-26 NOTE — DISCHARGE NOTE PROVIDER - CARE PROVIDER_API CALL
Dia Edmonds  Thoracic Surgery  [id number] [place] [address], [address] [place] [address]  Phone: [phone]  Fax: [phone]  Follow Up Time:    Dia Edmonds  Thoracic Surgery  [id number] [place] [address], [address] [place] [address]  Phone: [phone]  Fax: [phone]  Follow Up Time: 2 weeks

## 2024-07-26 NOTE — DISCHARGE NOTE PROVIDER - NSDCCPTREATMENT_GEN_ALL_CORE_FT
PRINCIPAL PROCEDURE  Procedure: Robot-assisted thoracoscopic wedge resection of lung  Findings and Treatment:       SECONDARY PROCEDURE  Procedure: Flexible bronchoscopy  Findings and Treatment:

## 2024-07-26 NOTE — DISCHARGE NOTE PROVIDER - NSDCFUADDINST_GEN_ALL_CORE_FT
Follow up with Dr. Edmonds in 10-14 days. Call Thoracic Surgery office [phone] to schedule an appointment. Please, obtain a CXR 1-2 days prior to your appointment and bring a copy with you.  Please, make an appointment with your Primary care provider.  You may shower in 24 hours with dressing and remove in the shower.  Keep the wound clean and dry it well after showering.  It’s OK if some fluid comes out of the chest tube hole unless blood or purulent. Blue Stitch will be removed by Dr. Edmonds in the office. No driving while taking pain meds. Some coughing and discomfort is expected.  You can remove chest tube dressing for  shower and replace with band-aid after showering if you think it is necessary otherwise leave it open it air.    Your chest tube sites may ooze a little, simply keep the site clean with soap and water and place small dressing or band-aid.  Call your doctor if the drainage is purulent or pus like or if drainage is increasing.    Do not be alarmed with shoulder, neck or back pain.  This could be due to your positioning on the operating table and is muscular pain. Call your doctor for signs of wound infection such as wide area of redness beyond the edges of your incision, pus coming from incisional or drainage tube site, unusual or new swelling and fever greater than 101 degrees.  Go to ER for prolonged shortness of breath that gets worse or sudden onset or severe shortness of breath that does not get better with rest. - Follow up with Dr. Edmonds in 2 weeks [phone]. Please call the office to make an appointment.   - Have your Chest x-ray done 1-2 days prior to your appointment.    - Please bring copy of x-ray to your appointment.  - Follow up with primary care provider in one week.  - Take the prescribed pain medication ONLY as needed.  - Can use over the counter Ibuprofen & or Tylenol as directed on the bottle.   - Please take a laxative or stool softeners to help support your bowel movements while on narcotic pain medication as it can cause constipation. Laxatives & stool softeners can be available over the counter at your local pharmacy.   - Please walk 4-5 x per day; increase as tolerated. You may climb stairs.   - Continue to use the incentive spirometer.   - You may keep wounds uncovered. Please shower daily with soap and water.   - The suture will be removed in the office at the follow up appointment.   - Please call the office at [phone] if you have fevers, chills, worsening shortness of breath, chest pain, warmth, redness or purulent discharge from the wound.

## 2024-07-26 NOTE — DISCHARGE NOTE PROVIDER - NSDCCPCAREPLAN_GEN_ALL_CORE_FT
PRINCIPAL DISCHARGE DIAGNOSIS  Diagnosis: Mass of right lung  Assessment and Plan of Treatment:      PRINCIPAL DISCHARGE DIAGNOSIS  Diagnosis: Mass of right lung  Assessment and Plan of Treatment: Please follow up with Dr. Edmonds in the office in 2 weeks. Please call and make an appointment. You may shower but remove all dressings first. Leave the white steri strips in place, they can get wet and they will fall off on their own. You may eat a regular diet. Please remain active & ambulatory but refrain from any heavy lifting.      SECONDARY DISCHARGE DIAGNOSES  Diagnosis: History of pulmonary embolism  Assessment and Plan of Treatment: Continue your current treatment regimen as directed by your primary care provider. Please also follow up with your primary care provider in one week. You can resume the Xarelto tomorrow 8/2.

## 2024-07-26 NOTE — PATIENT PROFILE ADULT - FALL HARM RISK - HARM RISK INTERVENTIONS

## 2024-07-26 NOTE — DISCHARGE NOTE PROVIDER - NSDCMRMEDTOKEN_GEN_ALL_CORE_FT
Xarelto 20 mg oral tablet: 1 tab(s) orally once a day   oxyCODONE 5 mg oral tablet: 1 tab(s) orally every 6 hours as needed for  moderate pain MDD: 4 tabs  Xarelto 20 mg oral tablet: 1 tab(s) orally once a day

## 2024-07-26 NOTE — CONSULT NOTE ADULT - SUBJECTIVE AND OBJECTIVE BOX
Syed Mckay MD  Interventional Cardiology / Endovascular Specialist  [place] Office : [address] [place] [address] [place] [address]  Tel:   [place] Office : [address] [facility] [place] [address]  Tel: [phone]        HISTORY OF PRESENTING ILLNESS:    66 y/o female with Orthostatic Hypotension, non obstructive CAD, VSD, renal donor and recurring palpitations (s/p attempted AT ablation in 2015) presents to [facility] preop for complex ablation on 1/12/21.  pt reports SOB and fatigue from her recurrent palpitations. she reports some relief with metoprolol.   pt was scheduled for ablation in november but had severe SOB and was subsequently diagnosed with DVT/PE. she is currently on Xarelto.    PAST MEDICAL & SURGICAL HISTORY:  Single kidney      History of orthostatic hypotension      Ventricular septal defect      Atrial tachycardia      Pulmonary embolism      Supraventricular tachycardia      Nonobstructive atherosclerosis of coronary artery      DVT, lower extremity      History of palpitations      Mass of right lung      S/p nephrectomy  donated kidney- left 1984          SOCIAL HISTORY: Substance Use (street drugs): ( x ) never used  (  ) other:    FAMILY HISTORY:  Family history of prostate cancer (Father)    FH: diabetes mellitus (Mother)        REVIEW OF SYSTEMS:  CONSTITUTIONAL: No fever, weight loss, or fatigue  EYES: No eye pain, visual disturbances, or discharge  ENMT:  No difficulty hearing, tinnitus, vertigo; No sinus or throat pain  BREASTS: No pain, masses, or nipple discharge  GASTROINTESTINAL: No abdominal or epigastric pain. No nausea, vomiting, or hematemesis; No diarrhea or constipation. No melena or hematochezia.  GENITOURINARY: No dysuria, frequency, hematuria, or incontinence  NEUROLOGICAL: No headaches, memory loss, loss of strength, numbness, or tremors  ENDOCRINE: No heat or cold intolerance; No hair loss  MUSCULOSKELETAL: No joint pain or swelling; No muscle, back, or extremity pain  PSYCHIATRIC: No depression, anxiety, mood swings, or difficulty sleeping  HEME/LYMPH: No easy bruising, or bleeding gums  All others negative    MEDICATIONS:  heparin  Infusion 400 Unit(s)/Hr IV Continuous <Continuous>  metoprolol tartrate 12.5 milliGRAM(s) Oral <User Schedule>      dornase milena Solution 2.5 milliGRAM(s) Inhalation every 12 hours  levalbuterol Inhalation 0.63 milliGRAM(s) Inhalation every 6 hours  sodium chloride 3%  Inhalation 4 milliLiter(s) Inhalation every 12 hours    acetaminophen     Tablet .. 650 milliGRAM(s) Oral every 6 hours  HYDROmorphone  Injectable 0.2 milliGRAM(s) IV Push every 3 hours PRN  ondansetron Injectable 4 milliGRAM(s) IV Push every 6 hours PRN  oxyCODONE    IR 5 milliGRAM(s) Oral every 3 hours PRN  oxyCODONE    IR 2.5 milliGRAM(s) Oral every 3 hours PRN    polyethylene glycol 3350 17 Gram(s) Oral daily  senna 2 Tablet(s) Oral at bedtime      lactated ringers. 1000 milliLiter(s) IV Continuous <Continuous>  lidocaine   4% Patch 1 Patch Transdermal <User Schedule>      FAMILY HISTORY:  Family history of prostate cancer (Father)    FH: diabetes mellitus (Mother)          Allergies    No Known Allergies    Intolerances    	      PHYSICAL EXAM:  T(C): 36.6 (07-26-24 @ 08:00), Max: 36.6 (07-25-24 @ 20:00)  HR: 61 (07-26-24 @ 16:00) (56 - 89)  BP: 104/60 (07-26-24 @ 16:00) (85/47 - 132/82)  RR: 19 (07-26-24 @ 16:00) (10 - 24)  SpO2: 91% (07-26-24 @ 16:00) (91% - 100%)  Wt(kg): --  I&O's Summary    25 Jul 2024 07:01  -  26 Jul 2024 07:00  --------------------------------------------------------  IN: 900 mL / OUT: 1190 mL / NET: -290 mL    26 Jul 2024 07:01  -  26 Jul 2024 17:42  --------------------------------------------------------  IN: 90 mL / OUT: 650 mL / NET: -560 mL        GENERAL: NAD  EYES: conjunctiva and sclera clear  ENMT: No tonsillar erythema, exudates, or enlargement  Cardiovascular: Normal S1 S2, No JVD, No murmurs, No edema  Respiratory: Lungs clear to auscultation	  Gastrointestinal:  Soft, Non-tender, + BS	  Extremities: No edema      LABS:	 	    CARDIAC MARKERS:                                  10.0   13.44 )-----------( 162      ( 26 Jul 2024 16:10 )             30.4     07-26    137  |  104  |  19  ----------------------------<  172<H>  4.7   |  21<L>  |  0.85    Ca    8.3<L>      26 Jul 2024 02:32  Phos  3.3     07-26  Mg     2.20     07-26      proBNP:   Lipid Profile:   HgA1c:   TSH:     Consultant(s) Notes Reviewed:  [x ] YES  [ ] NO    Care Discussed with Consultants/Other Providers [ x] YES  [ ] NO    Imaging Personally Reviewed independently:  [x] YES  [ ] NO    All labs, radiologic studies, vitals, orders and medications list reviewed. Patient is seen and examined at bedside. Case discussed with medical team.    ASSESSMENT/PLAN:

## 2024-07-26 NOTE — PROGRESS NOTE ADULT - SUBJECTIVE AND OBJECTIVE BOX
CHIEF COMPLAINT: FOLLOW UP IN ICU FOR POSTOPERATIVE CARE OF PATIENT WHO IS S/P LUNG RESECTION      PROCEDURES: R VATS, robotic RUL wedge resection, MLND 25-Jul-2024       ISSUES:   Lung nodule  Post op pain  Chest tube in place  s/p L Nephrectomy -- kidney donor  Hx of lower extremity DVT on Xarelto  Hx of PE on Xarelto  Hx of SVT s/p ablation   Ventral septal defect  CAD  Orthostatic hypotension    INTERVAL EVENTS:     Failed clamp trial, chest tube back on suction  Starting heparin drip without bolus, PTT 40-50. Monitor chest tube output for bleeding.    HISTORY:   Patient reports moderate pain at chest wall incision sites which is worse with coughing and deep breathing without associated fever or dyspnea. Pain is improved with use of pain meds.     PHYSICAL EXAM:   Gen: Comfortable, No acute distress  Eyes: Sclera white, Conjunctiva normal, Eyelids normal, Pupils symmetrical   ENT: Mucous membranes moist,  ,  ,    Neck: Trachea midline,  ,  ,  ,  ,  ,    CV: Rate regular, Rhythm regular,  ,  ,    Resp: Breath sounds clear, No accessory muscles use, R chest tube in place,  ,    Abd: Soft, Non-distended, Non-tender,   ,  ,  ,    Skin: Warm, No peripheral edema of lower extremities,  ,    : No turner  Neuro: Moving all 4 extremities,    Psych: A&Ox3      ASSESSMENT AND PLAN:     NEURO:  Post-operative Pain - Stable. Pain control with PCA and Tylenol IV PRN.       RESPIRATORY:  Hypoxia - Wean nasal cannula for goal O2sat above 92. Obtain CXR. Incentive spirometry. Chest PT and frequent suctioning. Continue bronchodilators. OOB to chair & ambulate w/ assistance. Continuous pulse oximetry for support & to prevent decompensation.       CARDIOVASCULAR:  Hemodynamically stable - Not on pressors. Continue hemodynamic monitoring.  Telemetry (medical test) - Reviewed by me today independently. Normal sinus rhythm.     CAD - stable.    Hx of SVT - stable.   - start metoprolol PO  - Telemetry monitoring       History of DVT and Pulmonary Embolism - stable.     - Start heparin drip     Orthostatic hypotension - stable.  - allow time for reacclimatizing before getting out of bed or ambulating  - Monitor BP      RENAL:  S/p L Nephrectomy (kidney donor) – Stable. Renally dose medications. Monitor for hyperkalemia and uremia. Avoid nephrotoxic medications. Monitor IOs and electrolytes.              GASTROINTESTINAL:  GI prophylaxis not indicated  Zofran and Reglan IV PRN for nausea  Regular consistency diet           HEMATOLOGIC:  No signs of active bleeding. Monitor Hgb in CBC in AM  DVT prophylaxis with heparin subQ               INFECTIOUS DISEASE:  All surgical sites appear clean. No signs of active infection. Will monitor for fever and leukocytosis.       ENDOCRINE:  Stable – Monitor glucose fingersticks for goal 120-180.               ONCOLOGY:  Lung nodule - Improved. S/P resection. Follow up final pathology.  - Chest tube – Pleurevac regulated suctioning. Monitor chest tube output.        Pertinent clinical, laboratory, radiographic, hemodynamic, echocardiographic, respiratory data, microbiologic data and chart were reviewed by myself and analyzed frequently throughout the course of the day and night by myself.    Plan discussed at length with the CTICU staff and Attending CT Surgeon -   Dr Dia Edmonds.       Patient's status was discussed with patient at bedside.       	  I have spent 50 minutes of time with this patient monitoring hemodynamic status, respiratory status, and coordinating care in the ICU.    _________________________  VITAL SIGNS:  Vital Signs Last 24 Hrs  T(C): 36.6 (26 Jul 2024 08:00), Max: 36.6 (25 Jul 2024 20:00)  T(F): 97.9 (26 Jul 2024 08:00), Max: 97.9 (26 Jul 2024 00:00)  HR: 64 (26 Jul 2024 14:55) (60 - 89)  BP: 127/85 (26 Jul 2024 14:00) (85/47 - 140/69)  BP(mean): 99 (26 Jul 2024 14:00) (58 - 99)  RR: 18 (26 Jul 2024 14:00) (10 - 24)  SpO2: 100% (26 Jul 2024 14:55) (91% - 100%)    Parameters below as of 26 Jul 2024 14:55  Patient On (Oxygen Delivery Method): room air      I/Os:   I&O's Detail    25 Jul 2024 07:01  -  26 Jul 2024 07:00  --------------------------------------------------------  IN:    Albumin 5%  - 250 mL: 250 mL    IV PiggyBack: 200 mL    Lactated Ringers: 450 mL  Total IN: 900 mL    OUT:    Chest Tube (mL): 315 mL    Voided (mL): 875 mL  Total OUT: 1190 mL    Total NET: -290 mL              MEDICATIONS:  MEDICATIONS  (STANDING):  acetaminophen     Tablet .. 650 milliGRAM(s) Oral every 6 hours  dornase milena Solution 2.5 milliGRAM(s) Inhalation every 12 hours  heparin  Infusion 400 Unit(s)/Hr (4 mL/Hr) IV Continuous <Continuous>  lactated ringers. 1000 milliLiter(s) (30 mL/Hr) IV Continuous <Continuous>  levalbuterol Inhalation 0.63 milliGRAM(s) Inhalation every 6 hours  lidocaine   4% Patch 1 Patch Transdermal <User Schedule>  metoprolol tartrate 12.5 milliGRAM(s) Oral <User Schedule>  polyethylene glycol 3350 17 Gram(s) Oral daily  senna 2 Tablet(s) Oral at bedtime  sodium chloride 3%  Inhalation 4 milliLiter(s) Inhalation every 12 hours    MEDICATIONS  (PRN):  HYDROmorphone  Injectable 0.2 milliGRAM(s) IV Push every 3 hours PRN Severe breakthrough Pain (7 - 10)  naloxone Injectable 0.1 milliGRAM(s) IV Push every 3 minutes PRN For ANY of the following changes in patient status:  A. RR LESS THAN 10 breaths per minute, B. Oxygen saturation LESS THAN 90%, C. Sedation score of 6  ondansetron Injectable 4 milliGRAM(s) IV Push every 6 hours PRN Nausea  oxyCODONE    IR 5 milliGRAM(s) Oral every 3 hours PRN Severe Pain (7 - 10)  oxyCODONE    IR 2.5 milliGRAM(s) Oral every 3 hours PRN Moderate Pain (4 - 6)      LABS:  Laboratory data was independently reviewed by me today.                           10.2   13.28 )-----------( 164      ( 26 Jul 2024 02:32 )             32.7     07-26    137  |  104  |  19  ----------------------------<  172<H>  4.7   |  21<L>  |  0.85    Ca    8.3<L>      26 Jul 2024 02:32  Phos  3.3     07-26  Mg     2.20     07-26        PT/INR - ( 26 Jul 2024 02:32 )   PT: 10.6 sec;   INR: 0.94 ratio         PTT - ( 26 Jul 2024 02:32 )  PTT:25.7 sec    Urinalysis Basic - ( 26 Jul 2024 02:32 )    Color: x / Appearance: x / SG: x / pH: x  Gluc: 172 mg/dL / Ketone: x  / Bili: x / Urobili: x   Blood: x / Protein: x / Nitrite: x   Leuk Esterase: x / RBC: x / WBC x   Sq Epi: x / Non Sq Epi: x / Bacteria: x        RADIOLOGY:   Radiology images were independently reviewed by me today. Reports were reviewed by me today.    Xray Chest 1 View- PORTABLE-Urgent:   ACC: [id number] EXAM:  XR CHEST PORTABLE URGENT 1V   ORDERED BY: CROW HOFFMAN     PROCEDURE DATE:  07/26/2024          INTERPRETATION:  EXAMINATION: XR CHEST URGENT    CLINICAL INDICATION: r/o PTX. Postoperative    TECHNIQUE: Single frontal portable view of the chest from 7/26/2024 12:09   PM    COMPARISON: Chest x-ray from 7/26/2024.    FINDINGS:  Right chest tube is in place.  Subcutaneous emphysema in the right neck.  Suture line in the right upper lung.    The heart size is normal.  Right upper lobe hazy opacity is unchanged.  Trace right pneumothorax. No pleural effusion.    IMPRESSION:  Trace right pneumothorax with chest tube unchanged.    --- End of Report ---          CATHERINE ROBLES MD; Resident Radiologist  This document has been electronically signed.  ANN MARIE SORTO MD; Attending Radiologist  This document has been electronically signed. Jul 26 2024 12:11PM (07-26-24 @ 12:09)  Xray Chest 1 View AP/PA:   ACC: [id number] EXAM:  XR CHEST AP OR PA 1V   ORDERED BY: SHERRI CHOWDARY     ACC: [id number] EXAM:  XR CHEST PORTABLE URGENT 1V   ORDERED BY: SHERRI CHOWDARY     PROCEDURE DATE:  07/25/2024          INTERPRETATION:  EXAMINATION: XR CHEST URGENT, XR CHEST    CLINICAL INDICATION: Post-op    TECHNIQUE: Single frontal portable view of the chest from 7/25/2024 4:01   PM    COMPARISON: Chest x-ray from 3/4/2019. PET/CT from 7/3/2024.    FINDINGS:  July 25, 2024 at 3:36 PM:  Right chest tube is in place.  Subcutaneous emphysema in the right neck.  The heart size is normal.  Chain sutures overlie the right upper lung zone with surrounding opacity   consistent with hematoma.  There is no pneumothorax or pleural effusion.    July 26 at 5:35 AM:  No interval change.    IMPRESSION: Status post right thoracotomy with chest tube.      --- End of Report ---          CATHERINE ROBLES MD; Resident Radiologist  This document has been electronically signed.  ANN MARIE SORTO MD; Attending Radiologist  This document has been electronically signed. Jul 26 2024  7:09AM (07-26-24 @ 06:02)  Xray Chest 1 View- PORTABLE-Urgent:   ACC: [id number] EXAM:  XR CHEST AP OR PA 1V   ORDERED BY: SHERRI CHOWDARY     ACC: [id number] EXAM:  XR CHEST PORTABLE URGENT 1V   ORDERED BY: SHERRI CHOWDARY     PROCEDURE DATE:  07/25/2024          INTERPRETATION:  EXAMINATION: XR CHEST URGENT, XR CHEST    CLINICAL INDICATION: Post-op    TECHNIQUE: Single frontal portable view of the chest from 7/25/2024 4:01   PM    COMPARISON: Chest x-ray from 3/4/2019. PET/CT from 7/3/2024.    FINDINGS:  July 25, 2024 at 3:36 PM:  Right chest tube is in place.  Subcutaneous emphysema in the right neck.  The heart size is normal.  Chain sutures overlie the right upper lung zone with surrounding opacity   consistent with hematoma.  There is no pneumothorax or pleural effusion.    July 26 at 5:35 AM:  No interval change.    IMPRESSION: Status post right thoracotomy with chest tube.      --- End of Report ---          CATHERINE ROBLES MD; Resident Radiologist  This document has been electronically signed.  ANN MARIE SORTO MD; Attending Radiologist  This document has been electronically signed. Jul 26 2024  7:09AM (07-25-24 @ 16:01)

## 2024-07-26 NOTE — DISCHARGE NOTE PROVIDER - HOSPITAL COURSE
69F with PMHx of orthostatic hypotension, CAD, s/p R nephrectomy, SVT failed ablation 2016, again in 2021 at [facility], VSD, PE, DVT (xarelto), lung nodule. On 7/25/24 she is s/p FB, robotic assisted RUL wedge, MLND. Chest tube was removed and post pull xray reviewed. Patient is stable for discharge home with continued out patient follow up. 69F with PMHx of orthostatic hypotension, CAD, s/p R nephrectomy, SVT failed ablation 2016, again in 2021 at [facility], VSD, PE, DVT (xarelto), lung nodule. On 7/25/24 she is s/p FB, robotic assisted RUL wedge, MLND. Patient failed water seal trial and chest tube placed to suction. Patient s/p bedside bleomycin on 7/30/24. Chest tube was removed and post pull xray reviewed. Patient is stable for discharge home with continued out patient follow up. 69F with PMHx of orthostatic hypotension, CAD, s/p R nephrectomy, SVT failed ablation 2016, again in 2021 at [facility], VSD, PE, DVT (on Xarelto), lung nodule. On 7/25/24 she is s/p FB, robotic assisted RUL wedge resection and mediastinal lymph node disseaction with Dr. Edmonds. Patient failed water seal trial post-op which required the chest tube to remain in. Patient s/p bedside bleomycin pleurodesis on 7/30/24. Chest tube was removed on 7/31 and post pull x-ray reviewed. Patient is stable for discharge home with continued out patient follow up. Will resume the Xarelto tomorrow 8/2.

## 2024-07-26 NOTE — CONSULT NOTE ADULT - SUBJECTIVE AND OBJECTIVE BOX
Reason for consult: PE/DVT    HPI:  Pt. is a 70 yo female with a lung mass that has been watched for the last few years.  The last CT scan revealed that it has increased in size.  s/P Robot-assisted thoracoscopic wedge resection of lung 7/25, known to [facility] and follows with Dr Ram for pulmonary emboli and DVT,  has history of PE with right heart strain, DVT, Lupus anticoagulant   DVT, Lupus anticoagulant    Patient was admitted for a planned RUL lung nodule s/p FB, R VATS, robotic RUL wedge resection, MLND  Seen today, daughter at bedside, recovering well        PAST MEDICAL & SURGICAL HISTORY:  Single kidney      History of orthostatic hypotension      Ventricular septal defect      Atrial tachycardia      Pulmonary embolism      Supraventricular tachycardia      Nonobstructive atherosclerosis of coronary artery      DVT, lower extremity      History of palpitations      Mass of right lung      S/p nephrectomy  donated kidney- left 1984          FAMILY HISTORY:  Family history of prostate cancer (Father)    FH: diabetes mellitus (Mother)        Alochol: Denied  Smoking: Nonsmoker  Drug Use: Denied  Marital Status:         Allergies    No Known Allergies    Intolerances        MEDICATIONS  (STANDING):  acetaminophen     Tablet .. 650 milliGRAM(s) Oral every 6 hours  dornase milena Solution 2.5 milliGRAM(s) Inhalation every 12 hours  heparin   Injectable 5000 Unit(s) SubCutaneous every 8 hours  lactated ringers. 1000 milliLiter(s) (30 mL/Hr) IV Continuous <Continuous>  levalbuterol Inhalation 0.63 milliGRAM(s) Inhalation every 6 hours  lidocaine   4% Patch 1 Patch Transdermal <User Schedule>  metoprolol tartrate 12.5 milliGRAM(s) Oral <User Schedule>  polyethylene glycol 3350 17 Gram(s) Oral daily  senna 2 Tablet(s) Oral at bedtime  sodium chloride 3%  Inhalation 4 milliLiter(s) Inhalation every 12 hours    MEDICATIONS  (PRN):  HYDROmorphone  Injectable 0.2 milliGRAM(s) IV Push every 3 hours PRN Severe breakthrough Pain (7 - 10)  naloxone Injectable 0.1 milliGRAM(s) IV Push every 3 minutes PRN For ANY of the following changes in patient status:  A. RR LESS THAN 10 breaths per minute, B. Oxygen saturation LESS THAN 90%, C. Sedation score of 6  ondansetron Injectable 4 milliGRAM(s) IV Push every 6 hours PRN Nausea  oxyCODONE    IR 5 milliGRAM(s) Oral every 3 hours PRN Severe Pain (7 - 10)  oxyCODONE    IR 2.5 milliGRAM(s) Oral every 3 hours PRN Moderate Pain (4 - 6)      ROS  No fever, sweats, chills  No epistaxis, HA, sore throat  No CP, SOB, cough, sputum  No n/v/d, abd pain, melena, hematochezia  No edema  No rash  No anxiety  No back pain, joint pain  No bleeding, bruising  No dysuria, hematuria    T(C): 36.3 (07-26-24 @ 04:00), Max: 36.6 (07-25-24 @ 20:00)  HR: 76 (07-26-24 @ 10:00) (60 - 89)  BP: 95/72 (07-26-24 @ 10:00) (85/47 - 140/69)  RR: 23 (07-26-24 @ 10:00) (10 - 24)  SpO2: 96% (07-26-24 @ 10:00) (91% - 100%)  Wt(kg): --    PE  NAD  Awake, alert  Anicteric, MMM  RRR  CTAB  Abd soft, NT, ND  No c/c/e  No rash grossly  FROM                          10.2   13.28 )-----------( 164      ( 26 Jul 2024 02:32 )             32.7       07-26    137  |  104  |  19  ----------------------------<  172<H>  4.7   |  21<L>  |  0.85    Ca    8.3<L>      26 Jul 2024 02:32  Phos  3.3     07-26  Mg     2.20     07-26     Reason for consult: PE/DVT    HPI:  Pt. is a 70 yo female with a lung mass that has been watched for the last few years.  The last CT scan revealed that it has increased in size.  s/P Robot-assisted thoracoscopic wedge resection of lung 7/25, known to [facility] and follows with Dr Ram for pulmonary emboli and DVT,  has history of PE with right heart strain, DVT, Lupus anticoagulant   DVT, Lupus anticoagulant    Patient was admitted for a planned RUL lung nodule s/p FB, R VATS, robotic RUL wedge resection, MLND  Seen today, daughter at bedside, recovering well        PAST MEDICAL & SURGICAL HISTORY:  Single kidney      History of orthostatic hypotension      Ventricular septal defect      Atrial tachycardia      Pulmonary embolism      Supraventricular tachycardia      Nonobstructive atherosclerosis of coronary artery      DVT, lower extremity      History of palpitations      Mass of right lung      S/p nephrectomy  donated kidney- left 1984          FAMILY HISTORY:  Family history of prostate cancer (Father)    FH: diabetes mellitus (Mother)        Alochol: Denied  Smoking: Nonsmoker  Drug Use: Denied  Marital Status:         Allergies    No Known Allergies    Intolerances        MEDICATIONS  (STANDING):  acetaminophen     Tablet .. 650 milliGRAM(s) Oral every 6 hours  dornase milena Solution 2.5 milliGRAM(s) Inhalation every 12 hours  heparin   Injectable 5000 Unit(s) SubCutaneous every 8 hours  lactated ringers. 1000 milliLiter(s) (30 mL/Hr) IV Continuous <Continuous>  levalbuterol Inhalation 0.63 milliGRAM(s) Inhalation every 6 hours  lidocaine   4% Patch 1 Patch Transdermal <User Schedule>  metoprolol tartrate 12.5 milliGRAM(s) Oral <User Schedule>  polyethylene glycol 3350 17 Gram(s) Oral daily  senna 2 Tablet(s) Oral at bedtime  sodium chloride 3%  Inhalation 4 milliLiter(s) Inhalation every 12 hours    MEDICATIONS  (PRN):  HYDROmorphone  Injectable 0.2 milliGRAM(s) IV Push every 3 hours PRN Severe breakthrough Pain (7 - 10)  naloxone Injectable 0.1 milliGRAM(s) IV Push every 3 minutes PRN For ANY of the following changes in patient status:  A. RR LESS THAN 10 breaths per minute, B. Oxygen saturation LESS THAN 90%, C. Sedation score of 6  ondansetron Injectable 4 milliGRAM(s) IV Push every 6 hours PRN Nausea  oxyCODONE    IR 5 milliGRAM(s) Oral every 3 hours PRN Severe Pain (7 - 10)  oxyCODONE    IR 2.5 milliGRAM(s) Oral every 3 hours PRN Moderate Pain (4 - 6)      ROS  No fever, sweats, chills  No epistaxis, HA, sore throat  No CP, SOB, cough, sputum  No n/v/d, abd pain, melena, hematochezia  No edema  No rash  No anxiety  No back pain, joint pain  No bleeding, bruising  No dysuria, hematuria    T(C): 36.3 (07-26-24 @ 04:00), Max: 36.6 (07-25-24 @ 20:00)  HR: 76 (07-26-24 @ 10:00) (60 - 89)  BP: 95/72 (07-26-24 @ 10:00) (85/47 - 140/69)  RR: 23 (07-26-24 @ 10:00) (10 - 24)  SpO2: 96% (07-26-24 @ 10:00) (91% - 100%)  Wt(kg): --    PE  NAD  Awake, alert  Anicteric, MMM  RRR  CTAB  Abd soft, NT, ND  No c/c/e  No rash grossly  FROM                          10.2   13.28 )-----------( 164      ( 26 Jul 2024 02:32 )             32.7       07-26    137  |  104  |  19  ----------------------------<  172<H>  4.7   |  21<L>  |  0.85    Ca    8.3<L>      26 Jul 2024 02:32  Phos  3.3     07-26  Mg     2.20     07-26 6/8/24 CTA chest  1. Enlarging right upper lobe lung nodule suspicious for primary neoplasm. Thoracic surgical consultation recommended.   2. Dilated proximal aorta: Sinus of Valsalva 4.2 cm and proximal ascending thoracic aorta 4.2 cm, not significantly changed.   3. Enlarged main pulmonary artery suggesting pulmonary artery hypertension. 4. Stable small hiatal hernia with small distal  esophageal diverticulum.    7/3/24 PET CT  1. Revisualized RIGHT UPPER lobe rounded pulmonary nodule is without abnormal uptake. This is indeterminate for neoplasm and  continued surveillance is suggested with dedicated chest CT.   2. Otherwise, no evidence of FDG­avid malignancy.

## 2024-07-27 LAB
ANION GAP SERPL CALC-SCNC: 10 MMOL/L — SIGNIFICANT CHANGE UP (ref 7–14)
APTT BLD: 32.6 SEC — SIGNIFICANT CHANGE UP (ref 24.5–35.6)
APTT BLD: 33.8 SEC — SIGNIFICANT CHANGE UP (ref 24.5–35.6)
APTT BLD: 37.7 SEC — HIGH (ref 24.5–35.6)
APTT BLD: 46.4 SEC — HIGH (ref 24.5–35.6)
BUN SERPL-MCNC: 23 MG/DL — SIGNIFICANT CHANGE UP (ref 7–23)
CA-I BLD-SCNC: 1.1 MMOL/L — LOW (ref 1.15–1.29)
CALCIUM SERPL-MCNC: 8.2 MG/DL — LOW (ref 8.4–10.5)
CHLORIDE SERPL-SCNC: 108 MMOL/L — HIGH (ref 98–107)
CO2 SERPL-SCNC: 22 MMOL/L — SIGNIFICANT CHANGE UP (ref 22–31)
CREAT SERPL-MCNC: 0.97 MG/DL — SIGNIFICANT CHANGE UP (ref 0.5–1.3)
EGFR: 63 ML/MIN/1.73M2 — SIGNIFICANT CHANGE UP
GLUCOSE SERPL-MCNC: 125 MG/DL — HIGH (ref 70–99)
HCT VFR BLD CALC: 28.6 % — LOW (ref 34.5–45)
HCT VFR BLD CALC: 29.2 % — LOW (ref 34.5–45)
HCT VFR BLD CALC: 30.1 % — LOW (ref 34.5–45)
HGB BLD-MCNC: 9.3 G/DL — LOW (ref 11.5–15.5)
HGB BLD-MCNC: 9.4 G/DL — LOW (ref 11.5–15.5)
HGB BLD-MCNC: 9.8 G/DL — LOW (ref 11.5–15.5)
INR BLD: 0.95 RATIO — SIGNIFICANT CHANGE UP (ref 0.85–1.18)
MAGNESIUM SERPL-MCNC: 2.2 MG/DL — SIGNIFICANT CHANGE UP (ref 1.6–2.6)
MCHC RBC-ENTMCNC: 27.5 PG — SIGNIFICANT CHANGE UP (ref 27–34)
MCHC RBC-ENTMCNC: 27.8 PG — SIGNIFICANT CHANGE UP (ref 27–34)
MCHC RBC-ENTMCNC: 27.8 PG — SIGNIFICANT CHANGE UP (ref 27–34)
MCHC RBC-ENTMCNC: 32.2 GM/DL — SIGNIFICANT CHANGE UP (ref 32–36)
MCHC RBC-ENTMCNC: 32.5 GM/DL — SIGNIFICANT CHANGE UP (ref 32–36)
MCHC RBC-ENTMCNC: 32.6 GM/DL — SIGNIFICANT CHANGE UP (ref 32–36)
MCV RBC AUTO: 85.3 FL — SIGNIFICANT CHANGE UP (ref 80–100)
MCV RBC AUTO: 85.4 FL — SIGNIFICANT CHANGE UP (ref 80–100)
MCV RBC AUTO: 85.6 FL — SIGNIFICANT CHANGE UP (ref 80–100)
NRBC # BLD: 0 /100 WBCS — SIGNIFICANT CHANGE UP (ref 0–0)
NRBC # FLD: 0 K/UL — SIGNIFICANT CHANGE UP (ref 0–0)
PHOSPHATE SERPL-MCNC: 2.8 MG/DL — SIGNIFICANT CHANGE UP (ref 2.5–4.5)
PLATELET # BLD AUTO: 138 K/UL — LOW (ref 150–400)
PLATELET # BLD AUTO: 141 K/UL — LOW (ref 150–400)
PLATELET # BLD AUTO: 153 K/UL — SIGNIFICANT CHANGE UP (ref 150–400)
POTASSIUM SERPL-MCNC: 4.6 MMOL/L — SIGNIFICANT CHANGE UP (ref 3.5–5.3)
POTASSIUM SERPL-SCNC: 4.6 MMOL/L — SIGNIFICANT CHANGE UP (ref 3.5–5.3)
PROTHROM AB SERPL-ACNC: 10.8 SEC — SIGNIFICANT CHANGE UP (ref 9.5–13)
RBC # BLD: 3.34 M/UL — LOW (ref 3.8–5.2)
RBC # BLD: 3.42 M/UL — LOW (ref 3.8–5.2)
RBC # BLD: 3.53 M/UL — LOW (ref 3.8–5.2)
RBC # FLD: 14.9 % — HIGH (ref 10.3–14.5)
RBC # FLD: 15.2 % — HIGH (ref 10.3–14.5)
RBC # FLD: 15.2 % — HIGH (ref 10.3–14.5)
SODIUM SERPL-SCNC: 140 MMOL/L — SIGNIFICANT CHANGE UP (ref 135–145)
WBC # BLD: 10.24 K/UL — SIGNIFICANT CHANGE UP (ref 3.8–10.5)
WBC # BLD: 10.58 K/UL — HIGH (ref 3.8–10.5)
WBC # BLD: 9.26 K/UL — SIGNIFICANT CHANGE UP (ref 3.8–10.5)
WBC # FLD AUTO: 10.24 K/UL — SIGNIFICANT CHANGE UP (ref 3.8–10.5)
WBC # FLD AUTO: 10.58 K/UL — HIGH (ref 3.8–10.5)
WBC # FLD AUTO: 9.26 K/UL — SIGNIFICANT CHANGE UP (ref 3.8–10.5)

## 2024-07-27 PROCEDURE — 71045 X-RAY EXAM CHEST 1 VIEW: CPT | Mod: 26

## 2024-07-27 RX ORDER — ALBUMIN HUMAN 25 %
250 INTRAVENOUS SOLUTION INTRAVENOUS ONCE
Refills: 0 | Status: COMPLETED | OUTPATIENT
Start: 2024-07-27 | End: 2024-07-27

## 2024-07-27 RX ORDER — CHLORHEXIDINE GLUCONATE 500 MG/1
1 CLOTH TOPICAL DAILY
Refills: 0 | Status: DISCONTINUED | OUTPATIENT
Start: 2024-07-27 | End: 2024-08-01

## 2024-07-27 RX ORDER — METOPROLOL TARTRATE 100 MG
12.5 TABLET ORAL
Refills: 0 | Status: DISCONTINUED | OUTPATIENT
Start: 2024-07-27 | End: 2024-07-30

## 2024-07-27 RX ADMIN — Medication 12.5 MILLIGRAM(S): at 15:18

## 2024-07-27 RX ADMIN — OXYCODONE HYDROCHLORIDE 5 MILLIGRAM(S): 30 TABLET ORAL at 08:09

## 2024-07-27 RX ADMIN — LEVALBUTEROL HYDROCHLORIDE 0.63 MILLIGRAM(S): 0.31 SOLUTION RESPIRATORY (INHALATION) at 22:03

## 2024-07-27 RX ADMIN — Medication 650 MILLIGRAM(S): at 11:46

## 2024-07-27 RX ADMIN — LIDOCAINE 5% 1 PATCH: 5 CREAM TOPICAL at 17:28

## 2024-07-27 RX ADMIN — HEPARIN SODIUM 10 UNIT(S)/HR: 1000 INJECTION, SOLUTION INTRAVENOUS; SUBCUTANEOUS at 20:14

## 2024-07-27 RX ADMIN — OXYCODONE HYDROCHLORIDE 5 MILLIGRAM(S): 30 TABLET ORAL at 07:39

## 2024-07-27 RX ADMIN — LEVALBUTEROL HYDROCHLORIDE 0.63 MILLIGRAM(S): 0.31 SOLUTION RESPIRATORY (INHALATION) at 11:48

## 2024-07-27 RX ADMIN — DORNASE ALFA 2.5 MILLIGRAM(S): 1 SOLUTION RESPIRATORY (INHALATION) at 22:09

## 2024-07-27 RX ADMIN — LIDOCAINE 5% 1 PATCH: 5 CREAM TOPICAL at 19:04

## 2024-07-27 RX ADMIN — Medication 4 MILLILITER(S): at 22:03

## 2024-07-27 RX ADMIN — Medication 650 MILLIGRAM(S): at 11:40

## 2024-07-27 RX ADMIN — Medication 500 MILLILITER(S): at 08:49

## 2024-07-27 RX ADMIN — Medication 250 MILLILITER(S): at 02:04

## 2024-07-27 RX ADMIN — LEVALBUTEROL HYDROCHLORIDE 0.63 MILLIGRAM(S): 0.31 SOLUTION RESPIRATORY (INHALATION) at 04:01

## 2024-07-27 RX ADMIN — HEPARIN SODIUM 10 UNIT(S)/HR: 1000 INJECTION, SOLUTION INTRAVENOUS; SUBCUTANEOUS at 16:02

## 2024-07-27 RX ADMIN — Medication 17 GRAM(S): at 11:39

## 2024-07-27 RX ADMIN — HEPARIN SODIUM 6 UNIT(S)/HR: 1000 INJECTION, SOLUTION INTRAVENOUS; SUBCUTANEOUS at 05:58

## 2024-07-27 RX ADMIN — Medication 650 MILLIGRAM(S): at 23:16

## 2024-07-27 RX ADMIN — LIDOCAINE 5% 1 PATCH: 5 CREAM TOPICAL at 05:27

## 2024-07-27 RX ADMIN — Medication 650 MILLIGRAM(S): at 17:33

## 2024-07-27 RX ADMIN — LEVALBUTEROL HYDROCHLORIDE 0.63 MILLIGRAM(S): 0.31 SOLUTION RESPIRATORY (INHALATION) at 16:42

## 2024-07-27 RX ADMIN — Medication 650 MILLIGRAM(S): at 05:10

## 2024-07-27 RX ADMIN — CHLORHEXIDINE GLUCONATE 1 APPLICATION(S): 500 CLOTH TOPICAL at 11:41

## 2024-07-27 RX ADMIN — Medication 4 MILLILITER(S): at 11:45

## 2024-07-27 RX ADMIN — HEPARIN SODIUM 8 UNIT(S)/HR: 1000 INJECTION, SOLUTION INTRAVENOUS; SUBCUTANEOUS at 11:05

## 2024-07-27 RX ADMIN — HEPARIN SODIUM 5 UNIT(S)/HR: 1000 INJECTION, SOLUTION INTRAVENOUS; SUBCUTANEOUS at 00:11

## 2024-07-27 RX ADMIN — DORNASE ALFA 2.5 MILLIGRAM(S): 1 SOLUTION RESPIRATORY (INHALATION) at 11:45

## 2024-07-27 RX ADMIN — Medication 650 MILLIGRAM(S): at 17:28

## 2024-07-27 RX ADMIN — Medication 100 GRAM(S): at 05:53

## 2024-07-27 NOTE — PHYSICAL THERAPY INITIAL EVALUATION ADULT - PERTINENT HX OF CURRENT PROBLEM, REHAB EVAL
Pt is a 69 year old female status post right Video assisted thoracoscopic surgery, robotic RUL wedge resection, mediastinal lymph node dissection.

## 2024-07-27 NOTE — PHYSICAL THERAPY INITIAL EVALUATION ADULT - DID THE PATIENT HAVE SURGERY?
status post right Video assisted thoracoscopic surgery, robotic RUL wedge resection, thoracoscopic mediastinal lymphnode dissection/yes

## 2024-07-27 NOTE — PHYSICAL THERAPY INITIAL EVALUATION ADULT - ADDITIONAL COMMENTS
Pt lives alone in an apartment with 9 stairs to enter, +elevator. prior to admission Pt was independent with all mobility and ambulated without an assistive device.     Pt. left comfortable in bedside chair with all tubes/lines intact, all bell in reach and in NAD. RN aware of session and pt current position and present at bedside.

## 2024-07-27 NOTE — PHYSICAL THERAPY INITIAL EVALUATION ADULT - GENERAL OBSERVATIONS, REHAB EVAL
Pt received sitting in bedside chair, +chest tube to wall suction, +monitor lines, +IV, +a-line, all lines/tubes intact, NAD. HR: 62 beats per minute

## 2024-07-27 NOTE — PROGRESS NOTE ADULT - SUBJECTIVE AND OBJECTIVE BOX
Syed Mckay MD  Interventional Cardiology / Endovascular Specialist  [place] Office : [address] [place] [address] [address]  Tel:   [place] Office : [address] [facility] [place] [address]  Tel: [phone]    Pt is lying in bed in NAD  	  MEDICATIONS:  heparin  Infusion 400 Unit(s)/Hr IV Continuous <Continuous>  metoprolol tartrate 12.5 milliGRAM(s) Oral <User Schedule>      dornase milena Solution 2.5 milliGRAM(s) Inhalation every 12 hours  levalbuterol Inhalation 0.63 milliGRAM(s) Inhalation every 6 hours  sodium chloride 3%  Inhalation 4 milliLiter(s) Inhalation every 12 hours    acetaminophen     Tablet .. 650 milliGRAM(s) Oral every 6 hours  HYDROmorphone  Injectable 0.2 milliGRAM(s) IV Push every 3 hours PRN  ondansetron Injectable 4 milliGRAM(s) IV Push every 6 hours PRN  oxyCODONE    IR 5 milliGRAM(s) Oral every 3 hours PRN  oxyCODONE    IR 2.5 milliGRAM(s) Oral every 3 hours PRN    polyethylene glycol 3350 17 Gram(s) Oral daily  senna 2 Tablet(s) Oral at bedtime      chlorhexidine 2% Cloths 1 Application(s) Topical daily  lidocaine   4% Patch 1 Patch Transdermal <User Schedule>      PAST MEDICAL/SURGICAL HISTORY  PAST MEDICAL & SURGICAL HISTORY:  Single kidney      History of orthostatic hypotension      Ventricular septal defect      Atrial tachycardia      Pulmonary embolism      Supraventricular tachycardia      Nonobstructive atherosclerosis of coronary artery      DVT, lower extremity      History of palpitations      Mass of right lung      S/p nephrectomy  donated kidney- left 1984          SOCIAL HISTORY: Substance Use (street drugs): ( x ) never used  (  ) other:    FAMILY HISTORY:  Family history of prostate cancer (Father)    FH: diabetes mellitus (Mother)      PHYSICAL EXAM:  T(C): 36.6 (07-27-24 @ 12:00), Max: 36.8 (07-26-24 @ 20:00)  HR: 64 (07-27-24 @ 13:00) (46 - 70)  BP: 87/63 (07-27-24 @ 01:00) (87/63 - 113/69)  RR: 19 (07-27-24 @ 13:00) (13 - 24)  SpO2: 92% (07-27-24 @ 13:00) (91% - 100%)  Wt(kg): --  I&O's Summary    26 Jul 2024 07:01  -  27 Jul 2024 07:00  --------------------------------------------------------  IN: 732 mL / OUT: 1810 mL / NET: -1078 mL    27 Jul 2024 07:01  -  27 Jul 2024 14:09  --------------------------------------------------------  IN: 292 mL / OUT: 850 mL / NET: -558 mL        GENERAL: NAD  EYES: conjunctiva and sclera clear  ENMT: No tonsillar erythema, exudates, or enlargement  Cardiovascular: Normal S1 S2, No JVD, No murmurs, No edema  Respiratory: Lungs clear to auscultation	  Gastrointestinal:  Soft, Non-tender, + BS	  Extremities: No edema                                9.8    10.24 )-----------( 153      ( 27 Jul 2024 10:30 )             30.1     07-27    140  |  108<H>  |  23  ----------------------------<  125<H>  4.6   |  22  |  0.97    Ca    8.2<L>      27 Jul 2024 04:45  Phos  2.8     07-27  Mg     2.20     07-27      proBNP:   Lipid Profile:   HgA1c:   TSH:     Consultant(s) Notes Reviewed:  [x ] YES  [ ] NO    Care Discussed with Consultants/Other Providers [ x] YES  [ ] NO    Imaging Personally Reviewed independently:  [x] YES  [ ] NO    All labs, radiologic studies, vitals, orders and medications list reviewed. Patient is seen and examined at bedside. Case discussed with medical team.

## 2024-07-27 NOTE — PROGRESS NOTE ADULT - SUBJECTIVE AND OBJECTIVE BOX
CHIEF COMPLAINT: FOLLOW UP IN ICU FOR POSTOPERATIVE CARE OF PATIENT WHO IS S/P LUNG RESECTION      PROCEDURES: R VATS, robotic RUL wedge resection, MLND 25-Jul-2024       ISSUES:   Lung nodule  Post op pain  Chest tube in place  s/p L Nephrectomy -- kidney donor  Hx of lower extremity DVT on Xarelto  Hx of PE on Xarelto  Hx of SVT s/p ablation   Ventral septal defect  CAD  Orthostatic hypotension    INTERVAL EVENTS:     Failed clamp trial, chest tube back on suction  Starting heparin drip without bolus, PTT 40-50. Monitor chest tube output for bleeding.    HISTORY:   Patient reports moderate pain at chest wall incision sites which is worse with coughing and deep breathing without associated fever or dyspnea. Pain is improved with use of pain meds.     PHYSICAL EXAM:   Gen: Comfortable, No acute distress  Eyes: Sclera white, Conjunctiva normal, Eyelids normal, Pupils symmetrical   ENT: Mucous membranes moist,  ,  ,    Neck: Trachea midline,  ,  ,  ,  ,  ,    CV: Rate regular, Rhythm regular,  ,  ,    Resp: Breath sounds clear, No accessory muscles use, R chest tube in place,  ,    Abd: Soft, Non-distended, Non-tender,   ,  ,  ,    Skin: Warm, No peripheral edema of lower extremities,  ,    : No turner  Neuro: Moving all 4 extremities,    Psych: A&Ox3      ASSESSMENT AND PLAN:     NEURO:  Post-operative Pain - Stable. Pain control with PCA and Tylenol IV PRN.       RESPIRATORY:  Hypoxia - Wean nasal cannula for goal O2sat above 92. Obtain CXR. Incentive spirometry. Chest PT and frequent suctioning. Continue bronchodilators. OOB to chair & ambulate w/ assistance. Continuous pulse oximetry for support & to prevent decompensation.       CARDIOVASCULAR:  Hemodynamically stable - Not on pressors. Continue hemodynamic monitoring.  Telemetry (medical test) - Reviewed by me today independently. Normal sinus rhythm.     CAD - stable.    Hx of SVT - stable.   - start metoprolol PO  - Telemetry monitoring       History of DVT and Pulmonary Embolism - stable.     - Start heparin drip     Orthostatic hypotension - stable.  - allow time for reacclimatizing before getting out of bed or ambulating  - Monitor BP      RENAL:  S/p L Nephrectomy (kidney donor) – Stable. Renally dose medications. Monitor for hyperkalemia and uremia. Avoid nephrotoxic medications. Monitor IOs and electrolytes.              GASTROINTESTINAL:  GI prophylaxis not indicated  Zofran and Reglan IV PRN for nausea  Regular consistency diet           HEMATOLOGIC:  No signs of active bleeding. Monitor Hgb in CBC in AM  DVT prophylaxis with heparin subQ               INFECTIOUS DISEASE:  All surgical sites appear clean. No signs of active infection. Will monitor for fever and leukocytosis.       ENDOCRINE:  Stable – Monitor glucose fingersticks for goal 120-180.               ONCOLOGY:  Lung nodule - Improved. S/P resection. Follow up final pathology.  - Chest tube – Pleurevac regulated suctioning. Monitor chest tube output.        Pertinent clinical, laboratory, radiographic, hemodynamic, echocardiographic, respiratory data, microbiologic data and chart were reviewed by myself and analyzed frequently throughout the course of the day and night by myself.    Plan discussed at length with the CTICU staff and Attending CT Surgeon -   Dr Dia Edmonds.       Patient's status was discussed with patient at bedside.       	  I have spent 50 minutes of time with this patient monitoring hemodynamic status, respiratory status, and coordinating care in the ICU.    _________________________  =============================================  VITAL SIGNS:  Vital Signs Last 24 Hrs  T(C): 37.3 (27 Jul 2024 20:00), Max: 37.3 (27 Jul 2024 20:00)  T(F): 99.1 (27 Jul 2024 20:00), Max: 99.1 (27 Jul 2024 20:00)  HR: 69 (27 Jul 2024 20:00) (46 - 77)  BP: 87/63 (27 Jul 2024 01:00) (87/63 - 113/69)  BP(mean): 71 (27 Jul 2024 01:00) (70 - 84)  RR: 19 (27 Jul 2024 20:00) (13 - 26)  SpO2: 91% (27 Jul 2024 20:00) (90% - 100%)    Parameters below as of 27 Jul 2024 20:00  Patient On (Oxygen Delivery Method): room air      I/Os:   I&O's Detail    26 Jul 2024 07:01  -  27 Jul 2024 07:00  --------------------------------------------------------  IN:    Albumin 5%  - 250 mL: 500 mL    Heparin: 62 mL    IV PiggyBack: 50 mL    Lactated Ringers: 120 mL  Total IN: 732 mL    OUT:    Chest Tube (mL): 160 mL    Voided (mL): 1650 mL  Total OUT: 1810 mL    Total NET: -1078 mL      27 Jul 2024 07:01  -  27 Jul 2024 21:58  --------------------------------------------------------  IN:    Albumin 5%  - 250 mL: 250 mL    Heparin: 108 mL  Total IN: 358 mL    OUT:    Chest Tube (mL): 170 mL    Voided (mL): 1650 mL  Total OUT: 1820 mL    Total NET: -1462 mL              MEDICATIONS:  MEDICATIONS  (STANDING):  acetaminophen     Tablet .. 650 milliGRAM(s) Oral every 6 hours  chlorhexidine 2% Cloths 1 Application(s) Topical daily  dornase milena Solution 2.5 milliGRAM(s) Inhalation every 12 hours  heparin  Infusion 400 Unit(s)/Hr (10 mL/Hr) IV Continuous <Continuous>  levalbuterol Inhalation 0.63 milliGRAM(s) Inhalation every 6 hours  lidocaine   4% Patch 1 Patch Transdermal <User Schedule>  metoprolol tartrate 12.5 milliGRAM(s) Oral <User Schedule>  polyethylene glycol 3350 17 Gram(s) Oral daily  senna 2 Tablet(s) Oral at bedtime  sodium chloride 3%  Inhalation 4 milliLiter(s) Inhalation every 12 hours    MEDICATIONS  (PRN):  HYDROmorphone  Injectable 0.2 milliGRAM(s) IV Push every 3 hours PRN Severe breakthrough Pain (7 - 10)  naloxone Injectable 0.1 milliGRAM(s) IV Push every 3 minutes PRN For ANY of the following changes in patient status:  A. RR LESS THAN 10 breaths per minute, B. Oxygen saturation LESS THAN 90%, C. Sedation score of 6  ondansetron Injectable 4 milliGRAM(s) IV Push every 6 hours PRN Nausea  oxyCODONE    IR 5 milliGRAM(s) Oral every 3 hours PRN Severe Pain (7 - 10)  oxyCODONE    IR 2.5 milliGRAM(s) Oral every 3 hours PRN Moderate Pain (4 - 6)      LABS:  Laboratory data was independently reviewed by me today.                           9.4    9.26  )-----------( 138      ( 27 Jul 2024 15:31 )             29.2     07-27    140  |  108<H>  |  23  ----------------------------<  125<H>  4.6   |  22  |  0.97    Ca    8.2<L>      27 Jul 2024 04:45  Phos  2.8     07-27  Mg     2.20     07-27        PT/INR - ( 27 Jul 2024 04:45 )   PT: 10.8 sec;   INR: 0.95 ratio         PTT - ( 27 Jul 2024 20:10 )  PTT:46.4 sec    Urinalysis Basic - ( 27 Jul 2024 04:45 )    Color: x / Appearance: x / SG: x / pH: x  Gluc: 125 mg/dL / Ketone: x  / Bili: x / Urobili: x   Blood: x / Protein: x / Nitrite: x   Leuk Esterase: x / RBC: x / WBC x   Sq Epi: x / Non Sq Epi: x / Bacteria: x        RADIOLOGY:   Radiology images were independently reviewed by me today. Reports were reviewed by me today.    Xray Chest 1 View- PORTABLE-Urgent:   ACC: [id number] EXAM:  XR CHEST PORTABLE URGENT 1V   ORDERED BY: CROW HOFFMAN     PROCEDURE DATE:  07/26/2024          INTERPRETATION:  EXAMINATION: XR CHEST URGENT    CLINICAL INDICATION: r/o PTX, clamped    TECHNIQUE: Single frontal portable view of the chest from 7/26/2024 2:49   PM    COMPARISON: 7/26/2024 chest x-ray.    FINDINGS:  Right chest tube is in place.  Subcutaneous emphysema in the right neck.  Suture line in the right upper lung.    The heart size is normal.  Right upper lobe hazy opacity is unchanged.  Small right pneumothorax is slightly increased from prior. No pleural   effusion.    IMPRESSION:  Small right pneumothorax is slightly increased from prior.    --- End of Report ---          CATHERINE ROBLES MD; Resident Radiologist  This document has been electronically signed.  ANN MARIE SORTO MD; Attending Radiologist  This document has been electronically signed. Jul 26 2024  4:41PM (07-26-24 @ 14:49)  Xray Chest 1 View- PORTABLE-Urgent:   ACC: [id number] EXAM:  XR CHEST PORTABLE URGENT 1V   ORDERED BY: CROW HOFFMAN     PROCEDURE DATE:  07/26/2024          INTERPRETATION:  EXAMINATION: XR CHEST URGENT    CLINICAL INDICATION: r/o PTX. Postoperative    TECHNIQUE: Single frontal portable view of the chest from 7/26/2024 12:09   PM    COMPARISON: Chest x-ray from 7/26/2024.    FINDINGS:  Right chest tube is in place.  Subcutaneous emphysema in the right neck.  Suture line in the right upper lung.    The heart size is normal.  Right upper lobe hazy opacity is unchanged.  Trace right pneumothorax. No pleural effusion.    IMPRESSION:  Trace right pneumothorax with chest tube unchanged.    --- End of Report ---          CATHERINE ROBLES MD; Resident Radiologist  This document has been electronically signed.  ANN MARIE SORTO MD; Attending Radiologist  This document has been electronically signed. Jul 26 2024 12:11PM (07-26-24 @ 12:09)  Xray Chest 1 View AP/PA:   ACC: [id number] EXAM:  XR CHEST AP OR PA 1V   ORDERED BY: SHERRI CHOWDARY     ACC: [id number] EXAM:  XR CHEST PORTABLE URGENT 1V   ORDERED BY: SHERRI CHOWDARY     PROCEDURE DATE:  07/25/2024          INTERPRETATION:  EXAMINATION: XR CHEST URGENT, XR CHEST    CLINICAL INDICATION: Post-op    TECHNIQUE: Single frontal portable view of the chest from 7/25/2024 4:01   PM    COMPARISON: Chest x-ray from 3/4/2019. PET/CT from 7/3/2024.    FINDINGS:  July 25, 2024 at 3:36 PM:  Right chest tube is in place.  Subcutaneous emphysema in the right neck.  The heart size is normal.  Chain sutures overlie the right upper lung zone with surrounding opacity   consistent with hematoma.  There is no pneumothorax or pleural effusion.    July 26 at 5:35 AM:  No interval change.    IMPRESSION: Status post right thoracotomy with chest tube.      --- End of Report ---          CATHERINE ROBLES MD; Resident Radiologist  This document has been electronically signed.  ANN MARIE SORTO MD; Attending Radiologist  This document has been electronically signed. Jul 26 2024  7:09AM (07-26-24 @ 06:02)      ============================================== CHIEF COMPLAINT: FOLLOW UP IN ICU FOR POSTOPERATIVE CARE OF PATIENT WHO IS S/P LUNG RESECTION      PROCEDURES:   R VATS, robotic RUL wedge resection, MLND 25-Jul-2024     ISSUES:   Pneumothorax/pleural gap  post lung  surgery  Lung nodule S/p resection  Post op pain  Chest tube in place  s/p L Nephrectomy -- kidney donor  Hx of lower extremity DVT on Xarelto  Hx of PE on Xarelto  Hx of SVT s/p ablation   Ventral septal defect  CAD  Orthostatic hypotension    INTERVAL EVENTS:   Failed clamp trial, chest tube back on suction, still pleural space gap/pneumothorax  Heparin drip titrate up to 900 unit/hour, PTT start to become therapeutic with an range  Tolerating small dose beta blocker    HISTORY:   Patient reports moderate pain at chest wall incision sites which is worse with coughing and deep breathing without associated fever or dyspnea. Pain is improved with use of pain meds.     PHYSICAL EXAM:   Gen: Comfortable, No acute distress  Eyes: Sclera white, Conjunctiva normal, Eyelids normal, Pupils symmetrical   ENT: Mucous membranes moist,  ,  ,    Neck: Trachea midline,  ,  ,  ,  ,  ,    CV: Rate regular, Rhythm regular,  ,  ,    Resp: Breath sounds clear, No accessory muscles use, R chest tube in place,  ,    Abd: Soft, Non-distended, Non-tender,   ,  ,  ,    Skin: Warm, No peripheral edema of lower extremities,  ,    : No turner  Neuro: Moving all 4 extremities,    Psych: A&Ox3      ASSESSMENT AND PLAN:     NEURO: Post-operative Pain - Stable. Pain control with PCA and Tylenol IV PRN.       RESPIRATORY:  Hypoxia - Wean nasal cannula for goal O2sat above 92. Obtain CXR. Incentive spirometry. Chest PT and frequent suctioning. Continue bronchodilators. OOB to chair & ambulate w/ assistance. Continuous pulse oximetry for support & to prevent decompensation.       CARDIOVASCULAR:  Hemodynamically stable - Not on pressors. Continue hemodynamic monitoring.  Telemetry (medical test) - Reviewed by me today independently. Normal sinus rhythm.     CAD - stable.    Hx of SVT - stable.   - start metoprolol PO  - Telemetry monitoring       History of DVT and Pulmonary Embolism -   Breathing comfortable room air  Heparin drip titrated up to therapeutic PTT        Orthostatic hypotension - stable.  One kidney (S/p nephrectomy kidney donor)  - allow time for reacclimatizing before getting out of bed or ambulating  - Monitor BP      RENAL:  S/p L Nephrectomy (kidney donor) – Stable. Renally dose medications. Monitor for hyperkalemia and uremia. Avoid nephrotoxic medications. Monitor IOs and electrolytes.        GASTROINTESTINAL:  GI prophylaxis not indicated  Zofran and Reglan IV PRN for nausea  Regular consistency diet       HEMATOLOGIC:  No signs of active bleeding. Monitor Hgb in CBC in AM  DVT prophylaxis with heparin subQ         INFECTIOUS DISEASE:  All surgical sites appear clean. No signs of active infection. Will monitor for fever and leukocytosis.       ENDOCRINE:  Stable – Monitor glucose fingersticks for goal 120-180.    ONCOLOGY:  Lung nodule - Improved. S/P resection. Follow up final pathology.  - Chest tube – Pleurevac regulated suctioning. Monitor chest tube output.        Pertinent clinical, laboratory, radiographic, hemodynamic, echocardiographic, respiratory data, microbiologic data and chart were reviewed by myself and analyzed frequently throughout the course of the day and night by myself.    Plan discussed at length with the CTICU staff and Attending CT Surgeon -   Dr LEATHA Rizvi  Patient's status was discussed with patient at bedside.    =============================================  VITAL SIGNS:  Vital Signs Last 24 Hrs  T(C): 37.3 (27 Jul 2024 20:00), Max: 37.3 (27 Jul 2024 20:00)  T(F): 99.1 (27 Jul 2024 20:00), Max: 99.1 (27 Jul 2024 20:00)  HR: 69 (27 Jul 2024 20:00) (46 - 77)  BP: 87/63 (27 Jul 2024 01:00) (87/63 - 113/69)  BP(mean): 71 (27 Jul 2024 01:00) (70 - 84)  RR: 19 (27 Jul 2024 20:00) (13 - 26)  SpO2: 91% (27 Jul 2024 20:00) (90% - 100%)    Parameters below as of 27 Jul 2024 20:00  Patient On (Oxygen Delivery Method): room air      I/Os:   I&O's Detail    26 Jul 2024 07:01  -  27 Jul 2024 07:00  --------------------------------------------------------  IN:    Albumin 5%  - 250 mL: 500 mL    Heparin: 62 mL    IV PiggyBack: 50 mL    Lactated Ringers: 120 mL  Total IN: 732 mL    OUT:    Chest Tube (mL): 160 mL    Voided (mL): 1650 mL  Total OUT: 1810 mL    Total NET: -1078 mL      27 Jul 2024 07:01  -  27 Jul 2024 21:58  --------------------------------------------------------  IN:    Albumin 5%  - 250 mL: 250 mL    Heparin: 108 mL  Total IN: 358 mL    OUT:    Chest Tube (mL): 170 mL    Voided (mL): 1650 mL  Total OUT: 1820 mL    Total NET: -1462 mL    MEDICATIONS:  MEDICATIONS  (STANDING):  acetaminophen     Tablet .. 650 milliGRAM(s) Oral every 6 hours  chlorhexidine 2% Cloths 1 Application(s) Topical daily  dornase milena Solution 2.5 milliGRAM(s) Inhalation every 12 hours  heparin  Infusion 400 Unit(s)/Hr (10 mL/Hr) IV Continuous <Continuous>  levalbuterol Inhalation 0.63 milliGRAM(s) Inhalation every 6 hours  lidocaine   4% Patch 1 Patch Transdermal <User Schedule>  metoprolol tartrate 12.5 milliGRAM(s) Oral <User Schedule>  polyethylene glycol 3350 17 Gram(s) Oral daily  senna 2 Tablet(s) Oral at bedtime  sodium chloride 3%  Inhalation 4 milliLiter(s) Inhalation every 12 hours    MEDICATIONS  (PRN):  HYDROmorphone  Injectable 0.2 milliGRAM(s) IV Push every 3 hours PRN Severe breakthrough Pain (7 - 10)  naloxone Injectable 0.1 milliGRAM(s) IV Push every 3 minutes PRN For ANY of the following changes in patient status:  A. RR LESS THAN 10 breaths per minute, B. Oxygen saturation LESS THAN 90%, C. Sedation score of 6  ondansetron Injectable 4 milliGRAM(s) IV Push every 6 hours PRN Nausea  oxyCODONE    IR 5 milliGRAM(s) Oral every 3 hours PRN Severe Pain (7 - 10)  oxyCODONE    IR 2.5 milliGRAM(s) Oral every 3 hours PRN Moderate Pain (4 - 6)      LABS:  Laboratory data was independently reviewed by me today.                           9.4    9.26  )-----------( 138      ( 27 Jul 2024 15:31 )             29.2     07-27    140  |  108<H>  |  23  ----------------------------<  125<H>  4.6   |  22  |  0.97    Ca    8.2<L>      27 Jul 2024 04:45  Phos  2.8     07-27  Mg     2.20     07-27        PT/INR - ( 27 Jul 2024 04:45 )   PT: 10.8 sec;   INR: 0.95 ratio         PTT - ( 27 Jul 2024 20:10 )  PTT:46.4 sec    Urinalysis Basic - ( 27 Jul 2024 04:45 )    Color: x / Appearance: x / SG: x / pH: x  Gluc: 125 mg/dL / Ketone: x  / Bili: x / Urobili: x   Blood: x / Protein: x / Nitrite: x   Leuk Esterase: x / RBC: x / WBC x   Sq Epi: x / Non Sq Epi: x / Bacteria: x        RADIOLOGY:   Radiology images were independently reviewed by me today. Reports were reviewed by me today.    Xray Chest 1 View- PORTABLE-Urgent:   ACC: [id number] EXAM:  XR CHEST PORTABLE URGENT 1V   ORDERED BY: CROW HOFFMAN     PROCEDURE DATE:  07/26/2024          INTERPRETATION:  EXAMINATION: XR CHEST URGENT    CLINICAL INDICATION: r/o PTX, clamped    TECHNIQUE: Single frontal portable view of the chest from 7/26/2024 2:49   PM    COMPARISON: 7/26/2024 chest x-ray.    FINDINGS:  Right chest tube is in place.  Subcutaneous emphysema in the right neck.  Suture line in the right upper lung.    The heart size is normal.  Right upper lobe hazy opacity is unchanged.  Small right pneumothorax is slightly increased from prior. No pleural   effusion.    IMPRESSION:  Small right pneumothorax is slightly increased from prior.    --- End of Report ---  Xray Chest 1 View- PORTABLE-Urgent:   ACC: [id number] EXAM:  XR CHEST PORTABLE URGENT 1V   ORDERED BY: CROW HOFFMAN     PROCEDURE DATE:  07/26/2024    INTERPRETATION:  EXAMINATION: XR CHEST URGENT    CLINICAL INDICATION: r/o PTX. Postoperative    TECHNIQUE: Single frontal portable view of the chest from 7/26/2024 12:09   PM    COMPARISON: Chest x-ray from 7/26/2024.    FINDINGS:  Right chest tube is in place.  Subcutaneous emphysema in the right neck.  Suture line in the right upper lung.    The heart size is normal.  Right upper lobe hazy opacity is unchanged.  Trace right pneumothorax. No pleural effusion.    IMPRESSION:  Trace right pneumothorax with chest tube unchanged.    --- End of Report ---    Xray Chest 1 View AP/PA:   ACC: [id number] EXAM:  XR CHEST AP OR PA 1V   ORDERED BY: SHERRI CHOWDARY     ACC: [id number] EXAM:  XR CHEST PORTABLE URGENT 1V   ORDERED BY: SHERRI CHOWDARY     PROCEDURE DATE:  07/25/2024    INTERPRETATION:  EXAMINATION: XR CHEST URGENT, XR CHEST  CLINICAL INDICATION: Post-op  TECHNIQUE: Single frontal portable view of the chest from 7/25/2024 4:01  PM  COMPARISON: Chest x-ray from 3/4/2019. PET/CT from 7/3/2024.    FINDINGS:  July 25, 2024 at 3:36 PM:  Right chest tube is in place.  Subcutaneous emphysema in the right neck.  The heart size is normal.  Chain sutures overlie the right upper lung zone with surrounding opacity   consistent with hematoma.  There is no pneumothorax or pleural effusion.  July 26 at 5:35 AM:  No interval change.  IMPRESSION: Status post right thoracotomy with chest tube.  --- End of Report ---  ==============================================  Patient requires continuous monitoring with:  bedside rhythm monitoring, continuous  pulse oximetry monitoring, O2 supplement titrate for O2 sat above 90%  ;   Respiratory management and monitoring; intermittent blood gas analysis.  Care plan discussed with CT ICU care team and attending surgeon Dr Schmitz  patient remain critical, at risk for life threatening decompensation; required more than usual post op care;   I have spent 35 minutes providing non routine post op care, revaluated multiple times.

## 2024-07-28 LAB
ANION GAP SERPL CALC-SCNC: 12 MMOL/L — SIGNIFICANT CHANGE UP (ref 7–14)
APTT BLD: 42.7 SEC — HIGH (ref 24.5–35.6)
APTT BLD: 48.4 SEC — HIGH (ref 24.5–35.6)
APTT BLD: 53.3 SEC — HIGH (ref 24.5–35.6)
BUN SERPL-MCNC: 23 MG/DL — SIGNIFICANT CHANGE UP (ref 7–23)
CALCIUM SERPL-MCNC: 8.6 MG/DL — SIGNIFICANT CHANGE UP (ref 8.4–10.5)
CHLORIDE SERPL-SCNC: 104 MMOL/L — SIGNIFICANT CHANGE UP (ref 98–107)
CO2 SERPL-SCNC: 24 MMOL/L — SIGNIFICANT CHANGE UP (ref 22–31)
CREAT SERPL-MCNC: 1.14 MG/DL — SIGNIFICANT CHANGE UP (ref 0.5–1.3)
EGFR: 52 ML/MIN/1.73M2 — LOW
GLUCOSE SERPL-MCNC: 128 MG/DL — HIGH (ref 70–99)
HCT VFR BLD CALC: 30.4 % — LOW (ref 34.5–45)
HGB BLD-MCNC: 9.5 G/DL — LOW (ref 11.5–15.5)
INR BLD: 0.96 RATIO — SIGNIFICANT CHANGE UP (ref 0.85–1.18)
MAGNESIUM SERPL-MCNC: 1.9 MG/DL — SIGNIFICANT CHANGE UP (ref 1.6–2.6)
MCHC RBC-ENTMCNC: 27.1 PG — SIGNIFICANT CHANGE UP (ref 27–34)
MCHC RBC-ENTMCNC: 31.3 GM/DL — LOW (ref 32–36)
MCV RBC AUTO: 86.6 FL — SIGNIFICANT CHANGE UP (ref 80–100)
NRBC # BLD: 0 /100 WBCS — SIGNIFICANT CHANGE UP (ref 0–0)
NRBC # FLD: 0 K/UL — SIGNIFICANT CHANGE UP (ref 0–0)
PHOSPHATE SERPL-MCNC: 3.6 MG/DL — SIGNIFICANT CHANGE UP (ref 2.5–4.5)
PLATELET # BLD AUTO: 151 K/UL — SIGNIFICANT CHANGE UP (ref 150–400)
POTASSIUM SERPL-MCNC: 4.4 MMOL/L — SIGNIFICANT CHANGE UP (ref 3.5–5.3)
POTASSIUM SERPL-SCNC: 4.4 MMOL/L — SIGNIFICANT CHANGE UP (ref 3.5–5.3)
PROTHROM AB SERPL-ACNC: 10.8 SEC — SIGNIFICANT CHANGE UP (ref 9.5–13)
RBC # BLD: 3.51 M/UL — LOW (ref 3.8–5.2)
RBC # FLD: 15 % — HIGH (ref 10.3–14.5)
SODIUM SERPL-SCNC: 140 MMOL/L — SIGNIFICANT CHANGE UP (ref 135–145)
WBC # BLD: 9.95 K/UL — SIGNIFICANT CHANGE UP (ref 3.8–10.5)
WBC # FLD AUTO: 9.95 K/UL — SIGNIFICANT CHANGE UP (ref 3.8–10.5)

## 2024-07-28 PROCEDURE — 99233 SBSQ HOSP IP/OBS HIGH 50: CPT

## 2024-07-28 PROCEDURE — 71045 X-RAY EXAM CHEST 1 VIEW: CPT | Mod: 26

## 2024-07-28 RX ORDER — HEPARIN SODIUM 1000 [USP'U]/ML
1000 INJECTION, SOLUTION INTRAVENOUS; SUBCUTANEOUS
Qty: 25000 | Refills: 0 | Status: DISCONTINUED | OUTPATIENT
Start: 2024-07-28 | End: 2024-07-29

## 2024-07-28 RX ADMIN — CHLORHEXIDINE GLUCONATE 1 APPLICATION(S): 500 CLOTH TOPICAL at 12:28

## 2024-07-28 RX ADMIN — SENNOSIDES 2 TABLET(S): 8.6 TABLET ORAL at 22:13

## 2024-07-28 RX ADMIN — Medication 4 MILLILITER(S): at 20:29

## 2024-07-28 RX ADMIN — HEPARIN SODIUM 10 UNIT(S)/HR: 1000 INJECTION, SOLUTION INTRAVENOUS; SUBCUTANEOUS at 19:16

## 2024-07-28 RX ADMIN — LIDOCAINE 5% 1 PATCH: 5 CREAM TOPICAL at 05:21

## 2024-07-28 RX ADMIN — LEVALBUTEROL HYDROCHLORIDE 0.63 MILLIGRAM(S): 0.31 SOLUTION RESPIRATORY (INHALATION) at 20:28

## 2024-07-28 RX ADMIN — LEVALBUTEROL HYDROCHLORIDE 0.63 MILLIGRAM(S): 0.31 SOLUTION RESPIRATORY (INHALATION) at 16:24

## 2024-07-28 RX ADMIN — Medication 17 GRAM(S): at 12:26

## 2024-07-28 RX ADMIN — Medication 650 MILLIGRAM(S): at 05:22

## 2024-07-28 RX ADMIN — Medication 4 MILLILITER(S): at 08:55

## 2024-07-28 RX ADMIN — Medication 12.5 MILLIGRAM(S): at 04:06

## 2024-07-28 RX ADMIN — LEVALBUTEROL HYDROCHLORIDE 0.63 MILLIGRAM(S): 0.31 SOLUTION RESPIRATORY (INHALATION) at 08:56

## 2024-07-28 RX ADMIN — DORNASE ALFA 2.5 MILLIGRAM(S): 1 SOLUTION RESPIRATORY (INHALATION) at 20:28

## 2024-07-28 RX ADMIN — DORNASE ALFA 2.5 MILLIGRAM(S): 1 SOLUTION RESPIRATORY (INHALATION) at 08:55

## 2024-07-28 RX ADMIN — LEVALBUTEROL HYDROCHLORIDE 0.63 MILLIGRAM(S): 0.31 SOLUTION RESPIRATORY (INHALATION) at 04:44

## 2024-07-28 RX ADMIN — Medication 12.5 MILLIGRAM(S): at 16:26

## 2024-07-28 NOTE — PROGRESS NOTE ADULT - SUBJECTIVE AND OBJECTIVE BOX
Syed Mckay MD  Interventional Cardiology / Endovascular Specialist  [place] Office : [phone] [place] [address] [address]. [address]  Tel:   [place] Office : [address] [facility] [place] [address]  Tel: [phone]    Pt is lying in bed in NAD  	  MEDICATIONS:  heparin  Infusion 400 Unit(s)/Hr IV Continuous <Continuous>  metoprolol tartrate 12.5 milliGRAM(s) Oral <User Schedule>      dornase milena Solution 2.5 milliGRAM(s) Inhalation every 12 hours  levalbuterol Inhalation 0.63 milliGRAM(s) Inhalation every 6 hours  sodium chloride 3%  Inhalation 4 milliLiter(s) Inhalation every 12 hours    HYDROmorphone  Injectable 0.2 milliGRAM(s) IV Push every 3 hours PRN  ondansetron Injectable 4 milliGRAM(s) IV Push every 6 hours PRN  oxyCODONE    IR 5 milliGRAM(s) Oral every 3 hours PRN  oxyCODONE    IR 2.5 milliGRAM(s) Oral every 3 hours PRN    polyethylene glycol 3350 17 Gram(s) Oral daily  senna 2 Tablet(s) Oral at bedtime      chlorhexidine 2% Cloths 1 Application(s) Topical daily  lidocaine   4% Patch 1 Patch Transdermal <User Schedule>      PAST MEDICAL/SURGICAL HISTORY  PAST MEDICAL & SURGICAL HISTORY:  Single kidney      History of orthostatic hypotension      Ventricular septal defect      Atrial tachycardia      Pulmonary embolism      Supraventricular tachycardia      Nonobstructive atherosclerosis of coronary artery      DVT, lower extremity      History of palpitations      Mass of right lung      S/p nephrectomy  donated kidney- left 1984          SOCIAL HISTORY: Substance Use (street drugs): ( x ) never used  (  ) other:    FAMILY HISTORY:  Family history of prostate cancer (Father)    FH: diabetes mellitus (Mother)    PHYSICAL EXAM:  T(C): 37.1 (07-28-24 @ 12:00), Max: 37.3 (07-27-24 @ 20:00)  HR: 69 (07-28-24 @ 12:00) (55 - 80)  BP: --  RR: 26 (07-28-24 @ 12:00) (14 - 26)  SpO2: 93% (07-28-24 @ 12:00) (90% - 100%)  Wt(kg): --  I&O's Summary    27 Jul 2024 07:01  -  28 Jul 2024 07:00  --------------------------------------------------------  IN: 468 mL / OUT: 3960 mL / NET: -3492 mL    28 Jul 2024 07:01  -  28 Jul 2024 12:57  --------------------------------------------------------  IN: 60 mL / OUT: 820 mL / NET: -760 mL      Height (cm): 170.2 (07-27 @ 21:55)  Weight (kg): 83 (07-27 @ 21:55)  BMI (kg/m2): 28.7 (07-27 @ 21:55)  BSA (m2): 1.95 (07-27 @ 21:55)      GENERAL: NAD  EYES: conjunctiva and sclera clear  ENMT: No tonsillar erythema, exudates, or enlargement  Cardiovascular: Normal S1 S2, No JVD, No murmurs, No edema  Respiratory: Lungs clear to auscultation	  Gastrointestinal:  Soft, Non-tender, + BS	  Extremities: No edema                              9.5    9.95  )-----------( 151      ( 28 Jul 2024 02:30 )             30.4     07-28    140  |  104  |  23  ----------------------------<  128<H>  4.4   |  24  |  1.14    Ca    8.6      28 Jul 2024 02:30  Phos  3.6     07-28  Mg     1.90     07-28      proBNP:   Lipid Profile:   HgA1c:   TSH:     Consultant(s) Notes Reviewed:  [x ] YES  [ ] NO    Care Discussed with Consultants/Other Providers [ x] YES  [ ] NO    Imaging Personally Reviewed independently:  [x] YES  [ ] NO    All labs, radiologic studies, vitals, orders and medications list reviewed. Patient is seen and examined at bedside. Case discussed with medical team.

## 2024-07-28 NOTE — PROGRESS NOTE ADULT - SUBJECTIVE AND OBJECTIVE BOX
CHIEF COMPLAINT: FOLLOW UP IN ICU FOR POSTOPERATIVE CARE OF PATIENT WHO IS S/P LUNG RESECTION      PROCEDURES: R VATS, robotic RUL wedge resection, MLND 25-Jul-2024       ISSUES:   Lung nodule  Post op pain  Chest tube in place  s/p L Nephrectomy -- kidney donor  Hx of lower extremity DVT on Xarelto  Hx of PE on Xarelto  Hx of SVT s/p ablation   Ventral septal defect  CAD  Orthostatic hypotension    INTERVAL EVENTS:     Chest tube on suction, no airleak  PTT 53 on heparin@1000units/h, Hct stable, chest tube output is serous.      HISTORY:   Patient reports moderate pain at chest wall incision sites which is worse with coughing and deep breathing without associated fever or dyspnea. Pain is improved with use of pain meds.         PHYSICAL EXAM:   Gen: Comfortable, No acute distress  Eyes: Sclera white, Conjunctiva normal, Eyelids normal, Pupils symmetrical   ENT: Mucous membranes moist,  ,  ,    Neck: Trachea midline,  ,  ,  ,  ,  ,    CV: Rate regular, Rhythm regular,  ,  ,    Resp: Breath sounds clear, No accessory muscles use, R chest tube in place,  ,    Abd: Soft, Non-distended, Non-tender,   ,  ,  ,    Skin: Warm, No peripheral edema of lower extremities,  ,    : No turner  Neuro: Moving all 4 extremities,    Psych: A&Ox3      ASSESSMENT AND PLAN:     NEURO:  Post-operative Pain - Stable. Pain control with PCA and Tylenol IV PRN.       RESPIRATORY:  Hypoxia - Wean nasal cannula for goal O2sat above 92. Obtain CXR. Incentive spirometry. Chest PT and frequent suctioning. Continue bronchodilators. OOB to chair & ambulate w/ assistance. Continuous pulse oximetry for support & to prevent decompensation.       CARDIOVASCULAR:  Hemodynamically stable - Not on pressors. Continue hemodynamic monitoring.  Telemetry (medical test) - Reviewed by me today independently. Normal sinus rhythm.     CAD - stable.    Hx of SVT - stable.   - start metoprolol PO  - Telemetry monitoring       History of DVT and Pulmonary Embolism - stable.     - Start heparin drip     Orthostatic hypotension - stable.  - allow time for reacclimatizing before getting out of bed or ambulating  - Monitor BP      RENAL:  S/p L Nephrectomy (kidney donor) – Stable. Renally dose medications. Monitor for hyperkalemia and uremia. Avoid nephrotoxic medications. Monitor IOs and electrolytes.              GASTROINTESTINAL:  GI prophylaxis not indicated  Zofran and Reglan IV PRN for nausea  Regular consistency diet           HEMATOLOGIC:  No signs of active bleeding. Monitor Hgb in CBC in AM  DVT prophylaxis with heparin subQ               INFECTIOUS DISEASE:  All surgical sites appear clean. No signs of active infection. Will monitor for fever and leukocytosis.       ENDOCRINE:  Stable – Monitor glucose fingersticks for goal 120-180.               ONCOLOGY:  Lung nodule - Improved. S/P resection. Follow up final pathology.  - Chest tube – Pleurevac regulated suctioning. Monitor chest tube output.        Pertinent clinical, laboratory, radiographic, hemodynamic, echocardiographic, respiratory data, microbiologic data and chart were reviewed by myself and analyzed frequently throughout the course of the day and night by myself.    Plan discussed at length with the CTICU staff and Attending CT Surgeon -   Dr Charlie Rizvi      Patient's status was discussed with patient at bedside.      I have spent 50 minutes of time with this patient monitoring hemodynamic status, respiratory status, and coordinating care in the ICU.    _________________________  VITAL SIGNS:  Vital Signs Last 24 Hrs  T(C): 37.2 (28 Jul 2024 16:00), Max: 37.3 (27 Jul 2024 20:00)  T(F): 98.9 (28 Jul 2024 16:00), Max: 99.1 (27 Jul 2024 20:00)  HR: 74 (28 Jul 2024 16:26) (55 - 83)  BP: 118/65 (28 Jul 2024 16:00) (118/65 - 135/62)  BP(mean): 82 (28 Jul 2024 16:00) (82 - 88)  RR: 20 (28 Jul 2024 16:00) (14 - 26)  SpO2: 96% (28 Jul 2024 16:26) (90% - 100%)    Parameters below as of 28 Jul 2024 17:00  Patient On (Oxygen Delivery Method): room air      I/Os:   I&O's Detail    27 Jul 2024 07:01  -  28 Jul 2024 07:00  --------------------------------------------------------  IN:    Albumin 5%  - 250 mL: 250 mL    Heparin: 218 mL  Total IN: 468 mL    OUT:    Chest Tube (mL): 210 mL    Voided (mL): 3750 mL  Total OUT: 3960 mL    Total NET: -3492 mL      28 Jul 2024 07:01  -  28 Jul 2024 16:59  --------------------------------------------------------  IN:    Heparin: 100 mL  Total IN: 100 mL    OUT:    Chest Tube (mL): 60 mL    Voided (mL): 1400 mL  Total OUT: 1460 mL    Total NET: -1360 mL              MEDICATIONS:  MEDICATIONS  (STANDING):  chlorhexidine 2% Cloths 1 Application(s) Topical daily  dornase milena Solution 2.5 milliGRAM(s) Inhalation every 12 hours  heparin  Infusion 400 Unit(s)/Hr (10 mL/Hr) IV Continuous <Continuous>  levalbuterol Inhalation 0.63 milliGRAM(s) Inhalation every 6 hours  lidocaine   4% Patch 1 Patch Transdermal <User Schedule>  metoprolol tartrate 12.5 milliGRAM(s) Oral <User Schedule>  polyethylene glycol 3350 17 Gram(s) Oral daily  senna 2 Tablet(s) Oral at bedtime  sodium chloride 3%  Inhalation 4 milliLiter(s) Inhalation every 12 hours    MEDICATIONS  (PRN):  HYDROmorphone  Injectable 0.2 milliGRAM(s) IV Push every 3 hours PRN Severe breakthrough Pain (7 - 10)  naloxone Injectable 0.1 milliGRAM(s) IV Push every 3 minutes PRN For ANY of the following changes in patient status:  A. RR LESS THAN 10 breaths per minute, B. Oxygen saturation LESS THAN 90%, C. Sedation score of 6  ondansetron Injectable 4 milliGRAM(s) IV Push every 6 hours PRN Nausea  oxyCODONE    IR 5 milliGRAM(s) Oral every 3 hours PRN Severe Pain (7 - 10)  oxyCODONE    IR 2.5 milliGRAM(s) Oral every 3 hours PRN Moderate Pain (4 - 6)      LABS:  Laboratory data was independently reviewed by me today.                           9.5    9.95  )-----------( 151      ( 28 Jul 2024 02:30 )             30.4     07-28    140  |  104  |  23  ----------------------------<  128<H>  4.4   |  24  |  1.14    Ca    8.6      28 Jul 2024 02:30  Phos  3.6     07-28  Mg     1.90     07-28        PT/INR - ( 28 Jul 2024 02:30 )   PT: 10.8 sec;   INR: 0.96 ratio         PTT - ( 28 Jul 2024 13:37 )  PTT:42.7 sec    Urinalysis Basic - ( 28 Jul 2024 02:30 )    Color: x / Appearance: x / SG: x / pH: x  Gluc: 128 mg/dL / Ketone: x  / Bili: x / Urobili: x   Blood: x / Protein: x / Nitrite: x   Leuk Esterase: x / RBC: x / WBC x   Sq Epi: x / Non Sq Epi: x / Bacteria: x        RADIOLOGY:   Radiology images were independently reviewed by me today. Reports were reviewed by me today.    Xray Chest 1 View- PORTABLE-Routine:   ACC: [id number] EXAM:  XR CHEST PORTABLE ROUTINE 1V   ORDERED BY: CROW HOFFMAN     ACC: [id number] EXAM:  XR CHEST PORTABLE ROUTINE 1V   ORDERED BY: SHABBIR BATRES     PROCEDURE DATE:  07/28/2024          INTERPRETATION:  Clinical Information: Postoperative    Technique: 2 AP chest x-ray(s)    Comparison: None    Findings/  Impression: Status post right chest tube. Cardiomegaly. Left lung clear.   Right upper lobe patchy opacity, improved. Trace right pleural effusion.   Right neck subcutaneous emphysema. Trace right pneumothorax.    --- End of Report ---            SIERRA AYALA MD; Attending Interventional Radiologist  This document has been electronically signed. Jul 28 2024  1:29PM (07-28-24 @ 06:23)  Xray Chest 1 View- PORTABLE-Routine:   ACC: [id number] EXAM:  XR CHEST PORTABLE ROUTINE 1V   ORDERED BY: CROW HOFFMAN     ACC: [id number] EXAM:  XR CHEST PORTABLE ROUTINE 1V   ORDERED BY: SHABBIR BATRES     PROCEDURE DATE:  07/28/2024          INTERPRETATION:  Clinical Information: Postoperative    Technique: 2 AP chest x-ray(s)    Comparison: None    Findings/  Impression: Status post right chest tube. Cardiomegaly. Left lung clear.   Right upper lobe patchy opacity, improved. Trace right pleural effusion.   Right neck subcutaneous emphysema. Trace right pneumothorax.    --- End of Report ---            SIERRA AYALA MD; Attending Interventional Radiologist  This document has been electronically signed. Jul 28 2024  1:29PM (07-27-24 @ 06:17)  Xray Chest 1 View- PORTABLE-Urgent:   ACC: [id number] EXAM:  XR CHEST PORTABLE URGENT 1V   ORDERED BY: CROW HOFFMAN     PROCEDURE DATE:  07/26/2024          INTERPRETATION:  EXAMINATION: XR CHEST URGENT    CLINICAL INDICATION: r/o PTX, clamped    TECHNIQUE: Single frontal portable view of the chest from 7/26/2024 2:49   PM    COMPARISON: 7/26/2024 chest x-ray.    FINDINGS:  Right chest tube is in place.  Subcutaneous emphysema in the right neck.  Suture line in the right upper lung.    The heart size is normal.  Right upper lobe hazy opacity is unchanged.  Small right pneumothorax is slightly increased from prior. No pleural   effusion.    IMPRESSION:  Small right pneumothorax is slightly increased from prior.    --- End of Report ---          CATHERINE ROBLES MD; Resident Radiologist  This document has been electronically signed.  ANN MARIE SORTO MD; Attending Radiologist  This document has been electronically signed. Jul 26 2024  4:41PM (07-26-24 @ 14:49)

## 2024-07-29 LAB
ANION GAP SERPL CALC-SCNC: 12 MMOL/L — SIGNIFICANT CHANGE UP (ref 7–14)
APTT BLD: 41.7 SEC — HIGH (ref 24.5–35.6)
APTT BLD: 43.5 SEC — HIGH (ref 24.5–35.6)
BUN SERPL-MCNC: 19 MG/DL — SIGNIFICANT CHANGE UP (ref 7–23)
CALCIUM SERPL-MCNC: 8.8 MG/DL — SIGNIFICANT CHANGE UP (ref 8.4–10.5)
CHLORIDE SERPL-SCNC: 102 MMOL/L — SIGNIFICANT CHANGE UP (ref 98–107)
CO2 SERPL-SCNC: 25 MMOL/L — SIGNIFICANT CHANGE UP (ref 22–31)
CREAT SERPL-MCNC: 0.94 MG/DL — SIGNIFICANT CHANGE UP (ref 0.5–1.3)
EGFR: 65 ML/MIN/1.73M2 — SIGNIFICANT CHANGE UP
GLUCOSE SERPL-MCNC: 129 MG/DL — HIGH (ref 70–99)
HCT VFR BLD CALC: 31 % — LOW (ref 34.5–45)
HGB BLD-MCNC: 10.4 G/DL — LOW (ref 11.5–15.5)
INR BLD: 0.98 RATIO — SIGNIFICANT CHANGE UP (ref 0.85–1.18)
MAGNESIUM SERPL-MCNC: 1.8 MG/DL — SIGNIFICANT CHANGE UP (ref 1.6–2.6)
MCHC RBC-ENTMCNC: 27.8 PG — SIGNIFICANT CHANGE UP (ref 27–34)
MCHC RBC-ENTMCNC: 33.5 GM/DL — SIGNIFICANT CHANGE UP (ref 32–36)
MCV RBC AUTO: 82.9 FL — SIGNIFICANT CHANGE UP (ref 80–100)
NRBC # BLD: 0 /100 WBCS — SIGNIFICANT CHANGE UP (ref 0–0)
NRBC # FLD: 0 K/UL — SIGNIFICANT CHANGE UP (ref 0–0)
PHOSPHATE SERPL-MCNC: 4.3 MG/DL — SIGNIFICANT CHANGE UP (ref 2.5–4.5)
PLATELET # BLD AUTO: 172 K/UL — SIGNIFICANT CHANGE UP (ref 150–400)
POTASSIUM SERPL-MCNC: 4.1 MMOL/L — SIGNIFICANT CHANGE UP (ref 3.5–5.3)
POTASSIUM SERPL-SCNC: 4.1 MMOL/L — SIGNIFICANT CHANGE UP (ref 3.5–5.3)
PROTHROM AB SERPL-ACNC: 11.1 SEC — SIGNIFICANT CHANGE UP (ref 9.5–13)
RBC # BLD: 3.74 M/UL — LOW (ref 3.8–5.2)
RBC # FLD: 14.8 % — HIGH (ref 10.3–14.5)
SODIUM SERPL-SCNC: 139 MMOL/L — SIGNIFICANT CHANGE UP (ref 135–145)
WBC # BLD: 10.19 K/UL — SIGNIFICANT CHANGE UP (ref 3.8–10.5)
WBC # FLD AUTO: 10.19 K/UL — SIGNIFICANT CHANGE UP (ref 3.8–10.5)

## 2024-07-29 PROCEDURE — 71045 X-RAY EXAM CHEST 1 VIEW: CPT | Mod: 26

## 2024-07-29 PROCEDURE — 71045 X-RAY EXAM CHEST 1 VIEW: CPT | Mod: 26,77

## 2024-07-29 PROCEDURE — 99233 SBSQ HOSP IP/OBS HIGH 50: CPT

## 2024-07-29 RX ORDER — MAGNESIUM SULFATE 500 MG/ML
2 VIAL (ML) INJECTION ONCE
Refills: 0 | Status: COMPLETED | OUTPATIENT
Start: 2024-07-29 | End: 2024-07-29

## 2024-07-29 RX ORDER — HEPARIN SODIUM 1000 [USP'U]/ML
1000 INJECTION, SOLUTION INTRAVENOUS; SUBCUTANEOUS
Qty: 25000 | Refills: 0 | Status: DISCONTINUED | OUTPATIENT
Start: 2024-07-29 | End: 2024-08-01

## 2024-07-29 RX ADMIN — Medication 4 MILLILITER(S): at 22:35

## 2024-07-29 RX ADMIN — LEVALBUTEROL HYDROCHLORIDE 0.63 MILLIGRAM(S): 0.31 SOLUTION RESPIRATORY (INHALATION) at 09:45

## 2024-07-29 RX ADMIN — DORNASE ALFA 2.5 MILLIGRAM(S): 1 SOLUTION RESPIRATORY (INHALATION) at 09:45

## 2024-07-29 RX ADMIN — LEVALBUTEROL HYDROCHLORIDE 0.63 MILLIGRAM(S): 0.31 SOLUTION RESPIRATORY (INHALATION) at 03:17

## 2024-07-29 RX ADMIN — OXYCODONE HYDROCHLORIDE 5 MILLIGRAM(S): 30 TABLET ORAL at 14:00

## 2024-07-29 RX ADMIN — Medication 12.5 MILLIGRAM(S): at 16:22

## 2024-07-29 RX ADMIN — Medication 12.5 MILLIGRAM(S): at 03:08

## 2024-07-29 RX ADMIN — Medication 17 GRAM(S): at 11:09

## 2024-07-29 RX ADMIN — LEVALBUTEROL HYDROCHLORIDE 0.63 MILLIGRAM(S): 0.31 SOLUTION RESPIRATORY (INHALATION) at 22:35

## 2024-07-29 RX ADMIN — Medication 4 MILLILITER(S): at 09:44

## 2024-07-29 RX ADMIN — DORNASE ALFA 2.5 MILLIGRAM(S): 1 SOLUTION RESPIRATORY (INHALATION) at 22:35

## 2024-07-29 RX ADMIN — OXYCODONE HYDROCHLORIDE 2.5 MILLIGRAM(S): 30 TABLET ORAL at 03:33

## 2024-07-29 RX ADMIN — OXYCODONE HYDROCHLORIDE 5 MILLIGRAM(S): 30 TABLET ORAL at 15:00

## 2024-07-29 RX ADMIN — Medication 25 GRAM(S): at 05:48

## 2024-07-29 RX ADMIN — CHLORHEXIDINE GLUCONATE 1 APPLICATION(S): 500 CLOTH TOPICAL at 11:09

## 2024-07-29 RX ADMIN — LEVALBUTEROL HYDROCHLORIDE 0.63 MILLIGRAM(S): 0.31 SOLUTION RESPIRATORY (INHALATION) at 15:59

## 2024-07-29 NOTE — PROGRESS NOTE ADULT - SUBJECTIVE AND OBJECTIVE BOX
EUGENIA ALMONTE                     MRN-[id number]    HPI:  Pt. is a 68 yo female with a lung mass that has been watched for the last few years.  The last CT scan revealed that it has increased in size.  (18 Jul 2024 15:57)    CHIEF COMPLAINT: FOLLOW UP IN ICU FOR POSTOPERATIVE CARE OF PATIENT WHO IS S/P LUNG RESECTION      PROCEDURES: R VATS, robotic RUL wedge resection, MLND 25-Jul-2024     ISSUES:   Lung nodule  Post op pain  Chest tube in place  s/p L Nephrectomy -- kidney donor  Hx of lower extremity DVT on Xarelto  Hx of PE on Xarelto  Hx of SVT s/p ablation   Ventral septal defect  CAD  Orthostatic hypotension    PAST MEDICAL & SURGICAL HISTORY:  Single kidney      History of orthostatic hypotension      Ventricular septal defect      Atrial tachycardia      Pulmonary embolism      Supraventricular tachycardia      Nonobstructive atherosclerosis of coronary artery      DVT, lower extremity      History of palpitations      Mass of right lung      S/p nephrectomy  donated kidney- left 1984                VITAL SIGNS:  Vital Signs Last 24 Hrs  T(C): 37 (29 Jul 2024 20:00), Max: 37.2 (29 Jul 2024 04:00)  T(F): 98.6 (29 Jul 2024 20:00), Max: 98.9 (29 Jul 2024 04:00)  HR: 72 (29 Jul 2024 20:00) (60 - 80)  BP: 141/86 (29 Jul 2024 16:00) (106/62 - 141/86)  BP(mean): 102 (29 Jul 2024 16:00) (75 - 102)  RR: 20 (29 Jul 2024 20:00) (14 - 23)  SpO2: 95% (29 Jul 2024 20:00) (94% - 98%)    Parameters below as of 29 Jul 2024 20:00  Patient On (Oxygen Delivery Method): room air        I/Os:   I&O's Detail    28 Jul 2024 07:01  -  29 Jul 2024 07:00  --------------------------------------------------------  IN:    Heparin: 130 mL    Heparin: 80 mL    Oral Fluid: 480 mL  Total IN: 690 mL    OUT:    Chest Tube (mL): 100 mL    Voided (mL): 2200 mL  Total OUT: 2300 mL    Total NET: -1610 mL      29 Jul 2024 07:01  -  29 Jul 2024 22:00  --------------------------------------------------------  IN:    Heparin: 80 mL  Total IN: 80 mL    OUT:    Chest Tube (mL): 10 mL    Voided (mL): 1400 mL  Total OUT: 1410 mL    Total NET: -1330 mL          CAPILLARY BLOOD GLUCOSE          =======================MEDICATIONS===================  MEDICATIONS  (STANDING):  chlorhexidine 2% Cloths 1 Application(s) Topical daily  dornase milena Solution 2.5 milliGRAM(s) Inhalation every 12 hours  heparin  Infusion 1000 Unit(s)/Hr (10 mL/Hr) IV Continuous <Continuous>  levalbuterol Inhalation 0.63 milliGRAM(s) Inhalation every 6 hours  lidocaine   4% Patch 1 Patch Transdermal <User Schedule>  metoprolol tartrate 12.5 milliGRAM(s) Oral <User Schedule>  polyethylene glycol 3350 17 Gram(s) Oral daily  senna 2 Tablet(s) Oral at bedtime  sodium chloride 3%  Inhalation 4 milliLiter(s) Inhalation every 12 hours    MEDICATIONS  (PRN):  HYDROmorphone  Injectable 0.2 milliGRAM(s) IV Push every 3 hours PRN Severe breakthrough Pain (7 - 10)  naloxone Injectable 0.1 milliGRAM(s) IV Push every 3 minutes PRN For ANY of the following changes in patient status:  A. RR LESS THAN 10 breaths per minute, B. Oxygen saturation LESS THAN 90%, C. Sedation score of 6  ondansetron Injectable 4 milliGRAM(s) IV Push every 6 hours PRN Nausea  oxyCODONE    IR 5 milliGRAM(s) Oral every 3 hours PRN Severe Pain (7 - 10)  oxyCODONE    IR 2.5 milliGRAM(s) Oral every 3 hours PRN Moderate Pain (4 - 6)      PHYSICAL EXAM============================  General:                         Awake, alert, not in any distress  Neuro:                            Moving all extremities to commands.   Respiratory:	Air entry fair and  bilateral conducted sounds                                           Effort even and unlabored.  CV:		Regular rate and rhythm. Normal S1/S2                                          Distal pulses present.  Abdomen:	                     Soft, non-distended. Bowel sounds present   Skin:		No rash.  Extremities:	Warm, no cyanosis or edema.  Palpable pulses    ============================LABS=========================                        10.4   10.19 )-----------( 172      ( 29 Jul 2024 03:00 )             31.0     07-29    139  |  102  |  19  ----------------------------<  129<H>  4.1   |  25  |  0.94    Ca    8.8      29 Jul 2024 03:00  Phos  4.3     07-29  Mg     1.80     07-29        PT/INR - ( 29 Jul 2024 03:00 )   PT: 11.1 sec;   INR: 0.98 ratio         PTT - ( 29 Jul 2024 18:10 )  PTT:41.7 sec    Urinalysis Basic - ( 29 Jul 2024 03:00 )    Color: x / Appearance: x / SG: x / pH: x  Gluc: 129 mg/dL / Ketone: x  / Bili: x / Urobili: x   Blood: x / Protein: x / Nitrite: x   Leuk Esterase: x / RBC: x / WBC x   Sq Epi: x / Non Sq Epi: x / Bacteria: x    ASSESSMENT AND PLAN:     NEURO:  Post-operative Pain - Stable. Pain control with PCA and Tylenol IV PRN.       RESPIRATORY:  Hypoxia - Wean nasal cannula for goal O2sat above 92. Obtain CXR. Incentive spirometry. Chest PT and frequent suctioning. Continue bronchodilators. OOB to chair & ambulate w/ assistance. Continuous pulse oximetry for support & to prevent decompensation.     Chest tube: on suction.     CARDIOVASCULAR:  Hemodynamically stable - Not on pressors. Continue hemodynamic monitoring.  Telemetry (medical test) - Reviewed by me today independently. Normal sinus rhythm.     CAD - stable.    Hx of SVT - stable.   - start metoprolol PO  - Telemetry monitoring       History of DVT and Pulmonary Embolism - stable.     - Start heparin drip     Orthostatic hypotension - stable.  - allow time for reacclimatizing before getting out of bed or ambulating  - Monitor BP      RENAL:  S/p L Nephrectomy (kidney donor) – Stable. Renally dose medications. Monitor for hyperkalemia and uremia. Avoid nephrotoxic medications. Monitor IOs and electrolytes.       GASTROINTESTINAL:  GI prophylaxis not indicated  Zofran and Reglan IV PRN for nausea  Regular consistency diet     HEMATOLOGIC:  No signs of active bleeding. Monitor Hgb in CBC in AM  DVT prophylaxis with heparin subQ       INFECTIOUS DISEASE:  All surgical sites appear clean. No signs of active infection. Will monitor for fever and leukocytosis.       ENDOCRINE:  Stable – Monitor glucose fingersticks for goal 120-180.       ONCOLOGY:  Lung nodule - Improved. S/P resection. Follow up final pathology.  - Chest tube – Pleurevac regulated suctioning. Monitor chest tube output.        Pertinent clinical, laboratory, radiographic, hemodynamic, echocardiographic, respiratory data, microbiologic data and chart were reviewed by myself and analyzed frequently throughout the course of the day and night by myself.    Plan discussed at length with the CTICU staff and Attending CT Surgeon      Patient's status was discussed with patient at bedside.      I have spent 50 minutes of time with this patient monitoring hemodynamic status, respiratory status, and coordinating care in the ICU.        Ezequiel ETEP

## 2024-07-29 NOTE — DIETITIAN INITIAL EVALUATION ADULT - ADD RECOMMEND
1. Provide encouragement with PO intake, menu selections, and assistance with meals as needed. Reinforce nutrition education as needed - RD remains available. Continue to monitor nutritional intake, labs, weights, BM, skin, clinical course. 2. Follow pt as per protocol.

## 2024-07-29 NOTE — DIETITIAN INITIAL EVALUATION ADULT - SIGNS/SYMPTOMS
The patient is a 64y Female who is followed by neurology because of stroke    Stroke  left sided weakness only, suspect lacunar syndrome  h/o DM, was hypertensive in ED and has slightly elevated LDL  was not ordered for alteplase as at time of code stroke notification the information I was presented suggested a time of onset 5 hours prior to ED presentation  Was not a candidate for thrombectomy as she had no LVO or ischemic penumbra detected on CTA and CTP head    Suggest the following:    - MRI brain  - echocardiogram, may need MARIAH/ILR depending on results  - PT/OT  - speech and swallow therapy  - continue ASA 81  - DVT prophylaxis    Lipids  check fasting lipid panel UNS=749  goal LDL is under 70  continue lipitor 80 mg daily    DM  keep normoglycemic  continue sliding scale    HTN  may allow permissive HTN for first 48 hours post CVA   treat SBP>220  normalize BP gradually after 48 hours post stroke    discussed with Dr Cormier and her children who were at her bedside    will follow with you    Lewis Lee MD PhD   [id number]      
pt s/p RUL wedge resection

## 2024-07-29 NOTE — PROGRESS NOTE ADULT - SUBJECTIVE AND OBJECTIVE BOX
Syed Mckay MD  Interventional Cardiology / Advance Heart Failure and Cardiac Transplant Specialist  [place] Office : [address] [place] [address] [facility] [address]  Tel:   [place] Office : [address] [facility] [place] [address]  Tel: [phone]       Pt is lying in bed comfortable not in distress, no chest pains no SOB no palpitations  	  MEDICATIONS:  heparin  Infusion 1000 Unit(s)/Hr IV Continuous <Continuous>  metoprolol tartrate 12.5 milliGRAM(s) Oral <User Schedule>      dornase milena Solution 2.5 milliGRAM(s) Inhalation every 12 hours  levalbuterol Inhalation 0.63 milliGRAM(s) Inhalation every 6 hours  sodium chloride 3%  Inhalation 4 milliLiter(s) Inhalation every 12 hours    HYDROmorphone  Injectable 0.2 milliGRAM(s) IV Push every 3 hours PRN  ondansetron Injectable 4 milliGRAM(s) IV Push every 6 hours PRN  oxyCODONE    IR 5 milliGRAM(s) Oral every 3 hours PRN  oxyCODONE    IR 2.5 milliGRAM(s) Oral every 3 hours PRN    polyethylene glycol 3350 17 Gram(s) Oral daily  senna 2 Tablet(s) Oral at bedtime      chlorhexidine 2% Cloths 1 Application(s) Topical daily  lidocaine   4% Patch 1 Patch Transdermal <User Schedule>      PAST MEDICAL/SURGICAL HISTORY  PAST MEDICAL & SURGICAL HISTORY:  Single kidney      History of orthostatic hypotension      Ventricular septal defect      Atrial tachycardia      Pulmonary embolism      Supraventricular tachycardia      Nonobstructive atherosclerosis of coronary artery      DVT, lower extremity      History of palpitations      Mass of right lung      S/p nephrectomy  donated kidney- left 1984          SOCIAL HISTORY: Substance Use (street drugs): ( x ) never used  (  ) other:    FAMILY HISTORY:  Family history of prostate cancer (Father)    FH: diabetes mellitus (Mother)      PHYSICAL EXAM:  T(C): 37 (07-29-24 @ 20:00), Max: 37.2 (07-29-24 @ 04:00)  HR: 72 (07-29-24 @ 20:00) (60 - 80)  BP: 141/86 (07-29-24 @ 16:00) (106/62 - 141/86)  RR: 20 (07-29-24 @ 20:00) (14 - 23)  SpO2: 95% (07-29-24 @ 20:00) (94% - 98%)  Wt(kg): --  I&O's Summary    28 Jul 2024 07:01  -  29 Jul 2024 07:00  --------------------------------------------------------  IN: 690 mL / OUT: 2300 mL / NET: -1610 mL    29 Jul 2024 07:01  -  29 Jul 2024 22:17  --------------------------------------------------------  IN: 80 mL / OUT: 1410 mL / NET: -1330 mL            EYES:   PERRLA   ENMT:   Moist mucous membranes, Good dentition, No lesions  Cardiovascular: Normal S1 S2, No JVD, No murmurs, No edema  Respiratory: Lungs clear to auscultation	  Gastrointestinal:  Soft, Non-tender, + BS	  Extremities: no edema                                    10.4   10.19 )-----------( 172      ( 29 Jul 2024 03:00 )             31.0     07-29    139  |  102  |  19  ----------------------------<  129<H>  4.1   |  25  |  0.94    Ca    8.8      29 Jul 2024 03:00  Phos  4.3     07-29  Mg     1.80     07-29      proBNP:   Lipid Profile:   HgA1c:   TSH:     Consultant(s) Notes Reviewed:  [x ] YES  [ ] NO    Care Discussed with Consultants/Other Providers [ x] YES  [ ] NO    Imaging Personally Reviewed independently:  [x] YES  [ ] NO    All labs, radiologic studies, vitals, orders and medications list reviewed. Patient is seen and examined at bedside. Case discussed with medical team.

## 2024-07-29 NOTE — DIETITIAN INITIAL EVALUATION ADULT - PERTINENT LABORATORY DATA
07-29    139  |  102  |  19  ----------------------------<  129<H>  4.1   |  25  |  0.94    Ca    8.8      29 Jul 2024 03:00  Phos  4.3     07-29  Mg     1.80     07-29

## 2024-07-29 NOTE — DIETITIAN INITIAL EVALUATION ADULT - NSFNSGIIOFT_GEN_A_CORE
07-28-24 @ 07:01  -  07-29-24 @ 07:00  --------------------------------------------------------  OUT:    Chest Tube (mL): 100 mL  Total OUT: 100 mL    Total NET: -100 mL      07-29-24 @ 07:01  -  07-29-24 @ 14:13  --------------------------------------------------------  OUT:    Chest Tube (mL): 10 mL  Total OUT: 10 mL    Total NET: -10 mL

## 2024-07-29 NOTE — DIETITIAN INITIAL EVALUATION ADULT - OTHER INFO
70 y/o female with hx DVT, CAD and lung mass admitted with a lung nodule now s/p RUL wedge resection. Pt awake, alert and able to answer questions. Pt confirms NKFA, denies difficulties chewing/swallowing. Pt lives at home alone, independent with ADLs PTA. Pt reports generally good appetite/PO intake PTA, consumes a regular diet at baseline. Pt reports good appetite/PO intake at present and is able to feed self independently. No BM per flowsheets. Ordered for Miralax 17 gm qD and senna 2 tablets qHS. Pt reported that her weight had been stable PTA with  lbs and current admit wt of 183 lbs. Discussed food choices for optimal healing and recovery. Patient with good understanding and appreciative of visit and information provided. Continue with nutrition plan of care as ordered. RDN services to remain available as needed.

## 2024-07-29 NOTE — DIETITIAN INITIAL EVALUATION ADULT - PERTINENT MEDS FT
MEDICATIONS  (STANDING):  chlorhexidine 2% Cloths 1 Application(s) Topical daily  dornase milena Solution 2.5 milliGRAM(s) Inhalation every 12 hours  heparin  Infusion 1000 Unit(s)/Hr (10 mL/Hr) IV Continuous <Continuous>  levalbuterol Inhalation 0.63 milliGRAM(s) Inhalation every 6 hours  lidocaine   4% Patch 1 Patch Transdermal <User Schedule>  metoprolol tartrate 12.5 milliGRAM(s) Oral <User Schedule>  polyethylene glycol 3350 17 Gram(s) Oral daily  senna 2 Tablet(s) Oral at bedtime  sodium chloride 3%  Inhalation 4 milliLiter(s) Inhalation every 12 hours    MEDICATIONS  (PRN):  HYDROmorphone  Injectable 0.2 milliGRAM(s) IV Push every 3 hours PRN Severe breakthrough Pain (7 - 10)  naloxone Injectable 0.1 milliGRAM(s) IV Push every 3 minutes PRN For ANY of the following changes in patient status:  A. RR LESS THAN 10 breaths per minute, B. Oxygen saturation LESS THAN 90%, C. Sedation score of 6  ondansetron Injectable 4 milliGRAM(s) IV Push every 6 hours PRN Nausea  oxyCODONE    IR 5 milliGRAM(s) Oral every 3 hours PRN Severe Pain (7 - 10)  oxyCODONE    IR 2.5 milliGRAM(s) Oral every 3 hours PRN Moderate Pain (4 - 6)

## 2024-07-30 LAB
ANION GAP SERPL CALC-SCNC: 12 MMOL/L — SIGNIFICANT CHANGE UP (ref 7–14)
APTT BLD: 41.6 SEC — HIGH (ref 24.5–35.6)
BUN SERPL-MCNC: 25 MG/DL — HIGH (ref 7–23)
CALCIUM SERPL-MCNC: 9 MG/DL — SIGNIFICANT CHANGE UP (ref 8.4–10.5)
CHLORIDE SERPL-SCNC: 101 MMOL/L — SIGNIFICANT CHANGE UP (ref 98–107)
CO2 SERPL-SCNC: 23 MMOL/L — SIGNIFICANT CHANGE UP (ref 22–31)
CREAT SERPL-MCNC: 1.03 MG/DL — SIGNIFICANT CHANGE UP (ref 0.5–1.3)
EGFR: 58 ML/MIN/1.73M2 — LOW
GLUCOSE SERPL-MCNC: 136 MG/DL — HIGH (ref 70–99)
HCT VFR BLD CALC: 34.6 % — SIGNIFICANT CHANGE UP (ref 34.5–45)
HGB BLD-MCNC: 10.9 G/DL — LOW (ref 11.5–15.5)
MAGNESIUM SERPL-MCNC: 2 MG/DL — SIGNIFICANT CHANGE UP (ref 1.6–2.6)
MCHC RBC-ENTMCNC: 27.1 PG — SIGNIFICANT CHANGE UP (ref 27–34)
MCHC RBC-ENTMCNC: 31.5 GM/DL — LOW (ref 32–36)
MCV RBC AUTO: 86.1 FL — SIGNIFICANT CHANGE UP (ref 80–100)
NON-GYNECOLOGICAL CYTOLOGY STUDY: SIGNIFICANT CHANGE UP
NRBC # BLD: 0 /100 WBCS — SIGNIFICANT CHANGE UP (ref 0–0)
NRBC # FLD: 0 K/UL — SIGNIFICANT CHANGE UP (ref 0–0)
PHOSPHATE SERPL-MCNC: 4.6 MG/DL — HIGH (ref 2.5–4.5)
PLATELET # BLD AUTO: 183 K/UL — SIGNIFICANT CHANGE UP (ref 150–400)
POTASSIUM SERPL-MCNC: 4.5 MMOL/L — SIGNIFICANT CHANGE UP (ref 3.5–5.3)
POTASSIUM SERPL-SCNC: 4.5 MMOL/L — SIGNIFICANT CHANGE UP (ref 3.5–5.3)
RBC # BLD: 4.02 M/UL — SIGNIFICANT CHANGE UP (ref 3.8–5.2)
RBC # FLD: 14.7 % — HIGH (ref 10.3–14.5)
SODIUM SERPL-SCNC: 136 MMOL/L — SIGNIFICANT CHANGE UP (ref 135–145)
WBC # BLD: 11.22 K/UL — HIGH (ref 3.8–10.5)
WBC # FLD AUTO: 11.22 K/UL — HIGH (ref 3.8–10.5)

## 2024-07-30 PROCEDURE — 71045 X-RAY EXAM CHEST 1 VIEW: CPT | Mod: 26

## 2024-07-30 PROCEDURE — 99233 SBSQ HOSP IP/OBS HIGH 50: CPT

## 2024-07-30 PROCEDURE — ZZZZZ: CPT

## 2024-07-30 RX ORDER — HYDROMORPHONE HCL IN 0.9% NACL 0.2 MG/ML
0.5 PLASTIC BAG, INJECTION (ML) INTRAVENOUS ONCE
Refills: 0 | Status: DISCONTINUED | OUTPATIENT
Start: 2024-07-30 | End: 2024-07-30

## 2024-07-30 RX ORDER — ACETAMINOPHEN 500 MG
1000 TABLET ORAL EVERY 6 HOURS
Refills: 0 | Status: COMPLETED | OUTPATIENT
Start: 2024-07-30 | End: 2024-07-30

## 2024-07-30 RX ORDER — DILTIAZEM HCL 90 MG
30 TABLET ORAL DAILY
Refills: 0 | Status: DISCONTINUED | OUTPATIENT
Start: 2024-07-30 | End: 2024-08-01

## 2024-07-30 RX ORDER — BLEOMYCIN 15 [USP'U]/1
60 INJECTION, POWDER, LYOPHILIZED, FOR SOLUTION INTRAMUSCULAR; INTRAPLEURAL; INTRAVENOUS; SUBCUTANEOUS ONCE
Refills: 0 | Status: DISCONTINUED | OUTPATIENT
Start: 2024-07-30 | End: 2024-08-01

## 2024-07-30 RX ORDER — DIPHENHYDRAMINE HCL 25 MG
25 CAPSULE ORAL ONCE
Refills: 0 | Status: COMPLETED | OUTPATIENT
Start: 2024-07-30 | End: 2024-07-30

## 2024-07-30 RX ORDER — ACETAMINOPHEN 500 MG
650 TABLET ORAL EVERY 6 HOURS
Refills: 0 | Status: DISCONTINUED | OUTPATIENT
Start: 2024-07-30 | End: 2024-08-01

## 2024-07-30 RX ADMIN — LEVALBUTEROL HYDROCHLORIDE 0.63 MILLIGRAM(S): 0.31 SOLUTION RESPIRATORY (INHALATION) at 15:52

## 2024-07-30 RX ADMIN — Medication 17 GRAM(S): at 12:43

## 2024-07-30 RX ADMIN — HEPARIN SODIUM 10 UNIT(S)/HR: 1000 INJECTION, SOLUTION INTRAVENOUS; SUBCUTANEOUS at 10:36

## 2024-07-30 RX ADMIN — Medication 400 MILLIGRAM(S): at 16:40

## 2024-07-30 RX ADMIN — HEPARIN SODIUM 10 UNIT(S)/HR: 1000 INJECTION, SOLUTION INTRAVENOUS; SUBCUTANEOUS at 07:32

## 2024-07-30 RX ADMIN — OXYCODONE HYDROCHLORIDE 5 MILLIGRAM(S): 30 TABLET ORAL at 15:42

## 2024-07-30 RX ADMIN — HEPARIN SODIUM 10 UNIT(S)/HR: 1000 INJECTION, SOLUTION INTRAVENOUS; SUBCUTANEOUS at 06:38

## 2024-07-30 RX ADMIN — DORNASE ALFA 2.5 MILLIGRAM(S): 1 SOLUTION RESPIRATORY (INHALATION) at 21:40

## 2024-07-30 RX ADMIN — Medication 4 MILLILITER(S): at 10:06

## 2024-07-30 RX ADMIN — Medication 0.5 MILLIGRAM(S): at 16:51

## 2024-07-30 RX ADMIN — Medication 4 MILLILITER(S): at 21:28

## 2024-07-30 RX ADMIN — OXYCODONE HYDROCHLORIDE 5 MILLIGRAM(S): 30 TABLET ORAL at 16:02

## 2024-07-30 RX ADMIN — SENNOSIDES 2 TABLET(S): 8.6 TABLET ORAL at 21:33

## 2024-07-30 RX ADMIN — LEVALBUTEROL HYDROCHLORIDE 0.63 MILLIGRAM(S): 0.31 SOLUTION RESPIRATORY (INHALATION) at 03:57

## 2024-07-30 RX ADMIN — LEVALBUTEROL HYDROCHLORIDE 0.63 MILLIGRAM(S): 0.31 SOLUTION RESPIRATORY (INHALATION) at 10:06

## 2024-07-30 RX ADMIN — LEVALBUTEROL HYDROCHLORIDE 0.63 MILLIGRAM(S): 0.31 SOLUTION RESPIRATORY (INHALATION) at 21:28

## 2024-07-30 RX ADMIN — HEPARIN SODIUM 10 UNIT(S)/HR: 1000 INJECTION, SOLUTION INTRAVENOUS; SUBCUTANEOUS at 19:21

## 2024-07-30 RX ADMIN — Medication 1 TABLET(S): at 18:37

## 2024-07-30 RX ADMIN — DORNASE ALFA 2.5 MILLIGRAM(S): 1 SOLUTION RESPIRATORY (INHALATION) at 10:06

## 2024-07-30 RX ADMIN — Medication 25 MILLIGRAM(S): at 16:40

## 2024-07-30 RX ADMIN — Medication 4 MILLIGRAM(S): at 22:55

## 2024-07-30 RX ADMIN — Medication 30 MILLIGRAM(S): at 06:49

## 2024-07-30 NOTE — PROVIDER CONTACT NOTE (OTHER) - SITUATION
tele strip heart block 2 type 1 received, did not get on report patient had this rhythm. showed tele strip to provider jose jensen.

## 2024-07-30 NOTE — PROGRESS NOTE ADULT - SUBJECTIVE AND OBJECTIVE BOX
EUGENIA ALMONTE                     MRN-[id number]    HPI:  Pt. is a 70 yo female with a lung mass that has been watched for the last few years.  The last CT scan revealed that it has increased in size.  (18 Jul 2024 15:57)    CHIEF COMPLAINT: FOLLOW UP IN ICU FOR POSTOPERATIVE CARE OF PATIENT WHO IS S/P LUNG RESECTION      PROCEDURES: R VATS, robotic RUL wedge resection, MLND 25-Jul-2024     ISSUES:   Lung nodule  Post op pain  Chest tube in place  s/p L Nephrectomy -- kidney donor  Hx of lower extremity DVT on Xarelto  Hx of PE on Xarelto  Hx of SVT s/p ablation   Ventral septal defect  CAD  Orthostatic hypotension    PAST MEDICAL & SURGICAL HISTORY:  Single kidney      History of orthostatic hypotension      Ventricular septal defect      Atrial tachycardia      Pulmonary embolism      Supraventricular tachycardia      Nonobstructive atherosclerosis of coronary artery      DVT, lower extremity      History of palpitations      Mass of right lung      S/p nephrectomy  donated kidney- left 1984                VITAL SIGNS:  Vital Signs Last 24 Hrs  T(C): 37.3 (30 Jul 2024 00:00), Max: 37.3 (30 Jul 2024 00:00)  T(F): 99.2 (30 Jul 2024 00:00), Max: 99.2 (30 Jul 2024 00:00)  HR: 74 (30 Jul 2024 00:00) (60 - 80)  BP: 114/63 (30 Jul 2024 00:00) (106/62 - 141/86)  BP(mean): 77 (30 Jul 2024 00:00) (75 - 102)  RR: 19 (30 Jul 2024 00:00) (14 - 23)  SpO2: 94% (30 Jul 2024 00:00) (94% - 98%)    Parameters below as of 30 Jul 2024 00:00  Patient On (Oxygen Delivery Method): room air        I/Os:   I&O's Detail    28 Jul 2024 07:01  -  29 Jul 2024 07:00  --------------------------------------------------------  IN:    Heparin: 130 mL    Heparin: 80 mL    Oral Fluid: 480 mL  Total IN: 690 mL    OUT:    Chest Tube (mL): 100 mL    Voided (mL): 2200 mL  Total OUT: 2300 mL    Total NET: -1610 mL      29 Jul 2024 07:01  -  30 Jul 2024 05:38  --------------------------------------------------------  IN:    Heparin: 150 mL    Oral Fluid: 240 mL  Total IN: 390 mL    OUT:    Chest Tube (mL): 40 mL    Voided (mL): 1800 mL  Total OUT: 1840 mL    Total NET: -1450 mL          CAPILLARY BLOOD GLUCOSE          =======================MEDICATIONS===================  MEDICATIONS  (STANDING):  chlorhexidine 2% Cloths 1 Application(s) Topical daily  diltiazem    Tablet 30 milliGRAM(s) Oral daily  dornase milena Solution 2.5 milliGRAM(s) Inhalation every 12 hours  heparin  Infusion 1000 Unit(s)/Hr (10 mL/Hr) IV Continuous <Continuous>  levalbuterol Inhalation 0.63 milliGRAM(s) Inhalation every 6 hours  lidocaine   4% Patch 1 Patch Transdermal <User Schedule>  polyethylene glycol 3350 17 Gram(s) Oral daily  senna 2 Tablet(s) Oral at bedtime  sodium chloride 3%  Inhalation 4 milliLiter(s) Inhalation every 12 hours    MEDICATIONS  (PRN):  HYDROmorphone  Injectable 0.2 milliGRAM(s) IV Push every 3 hours PRN Severe breakthrough Pain (7 - 10)  naloxone Injectable 0.1 milliGRAM(s) IV Push every 3 minutes PRN For ANY of the following changes in patient status:  A. RR LESS THAN 10 breaths per minute, B. Oxygen saturation LESS THAN 90%, C. Sedation score of 6  ondansetron Injectable 4 milliGRAM(s) IV Push every 6 hours PRN Nausea  oxyCODONE    IR 5 milliGRAM(s) Oral every 3 hours PRN Severe Pain (7 - 10)  oxyCODONE    IR 2.5 milliGRAM(s) Oral every 3 hours PRN Moderate Pain (4 - 6)    PHYSICAL EXAM============================  General:                         Awake, alert, not in any distress  Neuro:                            Moving all extremities to commands.   Respiratory:	Air entry fair and  bilateral conducted sounds                                           Effort even and unlabored.  CV:		Regular rate and rhythm. Normal S1/S2                                          Distal pulses present.  Abdomen:	                     Soft, non-distended. Bowel sounds present   Skin:		No rash.  Extremities:	Warm, no cyanosis or edema.  Palpable pulses    ============================LABS=========================                        10.9   11.22 )-----------( 183      ( 30 Jul 2024 03:00 )             34.6     07-30    136  |  101  |  25<H>  ----------------------------<  136<H>  4.5   |  23  |  1.03    Ca    9.0      30 Jul 2024 03:00  Phos  4.6     07-30  Mg     2.00     07-30        PT/INR - ( 29 Jul 2024 03:00 )   PT: 11.1 sec;   INR: 0.98 ratio         PTT - ( 30 Jul 2024 04:30 )  PTT:41.6 sec    Urinalysis Basic - ( 30 Jul 2024 03:00 )    Color: x / Appearance: x / SG: x / pH: x  Gluc: 136 mg/dL / Ketone: x  / Bili: x / Urobili: x   Blood: x / Protein: x / Nitrite: x   Leuk Esterase: x / RBC: x / WBC x   Sq Epi: x / Non Sq Epi: x / Bacteria: x        ASSESSMENT AND PLAN:     NEURO:  Post-operative Pain - Stable. Pain control with PCA and Tylenol IV PRN.       RESPIRATORY:  Hypoxia - Wean nasal cannula for goal O2sat above 92. Obtain CXR. Incentive spirometry. Chest PT and frequent suctioning. Continue bronchodilators. OOB to chair & ambulate w/ assistance. Continuous pulse oximetry for support & to prevent decompensation.     Chest tube: on suction.     CARDIOVASCULAR:  Hemodynamically stable - Not on pressors. Continue hemodynamic monitoring.  Telemetry (medical test) - Reviewed by me today independently. Normal sinus rhythm.     CAD - stable.    Hx of SVT - stable.   - start Cardizem PO, d/c lopressor  - Telemetry monitoring       History of DVT and Pulmonary Embolism - stable.     - Start heparin drip     Orthostatic hypotension - stable.  - allow time for reacclimatizing before getting out of bed or ambulating  - Monitor BP      RENAL:  S/p L Nephrectomy (kidney donor) – Stable. Renally dose medications. Monitor for hyperkalemia and uremia. Avoid nephrotoxic medications. Monitor IOs and electrolytes.       GASTROINTESTINAL:  GI prophylaxis not indicated  Zofran and Reglan IV PRN for nausea  Regular consistency diet     HEMATOLOGIC:  No signs of active bleeding. Monitor Hgb in CBC in AM  DVT prophylaxis with heparin subQ       INFECTIOUS DISEASE:  All surgical sites appear clean. No signs of active infection. Will monitor for fever and leukocytosis.       ENDOCRINE:  Stable – Monitor glucose fingersticks for goal 120-180.       ONCOLOGY:  Lung nodule - Improved. S/P resection. Follow up final pathology.  - Chest tube – Pleurevac regulated suctioning. Monitor chest tube output.        Pertinent clinical, laboratory, radiographic, hemodynamic, echocardiographic, respiratory data, microbiologic data and chart were reviewed by myself and analyzed frequently throughout the course of the day and night by myself.    Plan discussed at length with the CTICU staff and Attending CT Surgeon      Patient's status was discussed with patient at bedside.      I have spent 50 minutes of time with this patient monitoring hemodynamic status, respiratory status, and coordinating care in the ICU.    Ezequiel TEEP

## 2024-07-30 NOTE — PROGRESS NOTE ADULT - SUBJECTIVE AND OBJECTIVE BOX
Syed Mckay MD  Interventional Cardiology / Advance Heart Failure and Cardiac Transplant Specialist  [place] Office : [address] [place] [address] [place] [address]  Tel:   [place] Office : [address] [facility] [place] [address]  Tel: [phone]       Pt is lying in bed comfortable not in distress, no chest pains no SOB no palpitations  	  MEDICATIONS:  diltiazem    Tablet 30 milliGRAM(s) Oral daily  heparin  Infusion 1000 Unit(s)/Hr IV Continuous <Continuous>      dornase milena Solution 2.5 milliGRAM(s) Inhalation every 12 hours  levalbuterol Inhalation 0.63 milliGRAM(s) Inhalation every 6 hours  sodium chloride 3%  Inhalation 4 milliLiter(s) Inhalation every 12 hours    HYDROmorphone  Injectable 0.2 milliGRAM(s) IV Push every 3 hours PRN  ondansetron Injectable 4 milliGRAM(s) IV Push every 6 hours PRN  oxyCODONE    IR 5 milliGRAM(s) Oral every 3 hours PRN  oxyCODONE    IR 2.5 milliGRAM(s) Oral every 3 hours PRN    polyethylene glycol 3350 17 Gram(s) Oral daily  senna 2 Tablet(s) Oral at bedtime      bleomycin PF IntraPleural (Non - oncologic) 60 Unit(s) IntraPleural. once  chlorhexidine 2% Cloths 1 Application(s) Topical daily  lidocaine   4% Patch 1 Patch Transdermal <User Schedule>      PAST MEDICAL/SURGICAL HISTORY  PAST MEDICAL & SURGICAL HISTORY:  Single kidney      History of orthostatic hypotension      Ventricular septal defect      Atrial tachycardia      Pulmonary embolism      Supraventricular tachycardia      Nonobstructive atherosclerosis of coronary artery      DVT, lower extremity      History of palpitations      Mass of right lung      S/p nephrectomy  donated kidney- left 1984          SOCIAL HISTORY: Substance Use (street drugs): ( x ) never used  (  ) other:    FAMILY HISTORY:  Family history of prostate cancer (Father)    FH: diabetes mellitus (Mother)          PHYSICAL EXAM:  T(C): 36.8 (07-30-24 @ 12:28), Max: 37.4 (07-30-24 @ 04:00)  HR: 75 (07-30-24 @ 12:28) (65 - 75)  BP: 110/61 (07-30-24 @ 12:28) (107/67 - 122/71)  RR: 18 (07-30-24 @ 12:28) (18 - 20)  SpO2: 94% (07-30-24 @ 12:28) (92% - 97%)  Wt(kg): --  I&O's Summary    29 Jul 2024 07:01  -  30 Jul 2024 07:00  --------------------------------------------------------  IN: 430 mL / OUT: 2070 mL / NET: -1640 mL    30 Jul 2024 07:01  -  30 Jul 2024 16:11  --------------------------------------------------------  IN: 80 mL / OUT: 570 mL / NET: -490 mL          EYES:   PERRLA   ENMT:   Moist mucous membranes, Good dentition, No lesions  Cardiovascular: Normal S1 S2, No JVD, No murmurs, No edema  Respiratory: Lungs clear to auscultation	  Gastrointestinal:  Soft, Non-tender, + BS	  Extremities: no edema                                      10.9   11.22 )-----------( 183      ( 30 Jul 2024 03:00 )             34.6     07-30    136  |  101  |  25<H>  ----------------------------<  136<H>  4.5   |  23  |  1.03    Ca    9.0      30 Jul 2024 03:00  Phos  4.6     07-30  Mg     2.00     07-30      proBNP:   Lipid Profile:   HgA1c:   TSH:     Consultant(s) Notes Reviewed:  [x ] YES  [ ] NO    Care Discussed with Consultants/Other Providers [ x] YES  [ ] NO    Imaging Personally Reviewed independently:  [x] YES  [ ] NO    All labs, radiologic studies, vitals, orders and medications list reviewed. Patient is seen and examined at bedside. Case discussed with medical team.

## 2024-07-30 NOTE — PROGRESS NOTE ADULT - SUBJECTIVE AND OBJECTIVE BOX
Patient is a 69y old  Female who presents with a chief complaint of Solitary pulmonary nodule     (29 Jul 2024 14:13)    Patient seen this morning. She feels well overall, has some mild discomfort at surgical site if she coughs but otherwise no complaints.     MEDICATIONS  (STANDING):  bleomycin PF IntraPleural (Non - oncologic) 60 Unit(s) IntraPleural. once  chlorhexidine 2% Cloths 1 Application(s) Topical daily  diltiazem    Tablet 30 milliGRAM(s) Oral daily  dornase milena Solution 2.5 milliGRAM(s) Inhalation every 12 hours  heparin  Infusion 1000 Unit(s)/Hr (10 mL/Hr) IV Continuous <Continuous>  levalbuterol Inhalation 0.63 milliGRAM(s) Inhalation every 6 hours  lidocaine   4% Patch 1 Patch Transdermal <User Schedule>  polyethylene glycol 3350 17 Gram(s) Oral daily  senna 2 Tablet(s) Oral at bedtime  sodium chloride 3%  Inhalation 4 milliLiter(s) Inhalation every 12 hours    MEDICATIONS  (PRN):  HYDROmorphone  Injectable 0.2 milliGRAM(s) IV Push every 3 hours PRN Severe breakthrough Pain (7 - 10)  naloxone Injectable 0.1 milliGRAM(s) IV Push every 3 minutes PRN For ANY of the following changes in patient status:  A. RR LESS THAN 10 breaths per minute, B. Oxygen saturation LESS THAN 90%, C. Sedation score of 6  ondansetron Injectable 4 milliGRAM(s) IV Push every 6 hours PRN Nausea  oxyCODONE    IR 5 milliGRAM(s) Oral every 3 hours PRN Severe Pain (7 - 10)  oxyCODONE    IR 2.5 milliGRAM(s) Oral every 3 hours PRN Moderate Pain (4 - 6)        Vital Signs Last 24 Hrs  T(C): 36.8 (30 Jul 2024 12:28), Max: 37.4 (30 Jul 2024 04:00)  T(F): 98.2 (30 Jul 2024 12:28), Max: 99.4 (30 Jul 2024 04:00)  HR: 75 (30 Jul 2024 12:28) (60 - 75)  BP: 110/61 (30 Jul 2024 12:28) (107/67 - 141/86)  BP(mean): 83 (30 Jul 2024 07:35) (77 - 102)  RR: 18 (30 Jul 2024 12:28) (18 - 21)  SpO2: 94% (30 Jul 2024 12:28) (92% - 98%)    Parameters below as of 30 Jul 2024 12:28  Patient On (Oxygen Delivery Method): room air        PE  NAD  Awake, alert  Anicteric, MMM  +chest tube   No c/c/e  No rash grossly  FROM                          10.9   11.22 )-----------( 183      ( 30 Jul 2024 03:00 )             34.6       07-30    136  |  101  |  25<H>  ----------------------------<  136<H>  4.5   |  23  |  1.03    Ca    9.0      30 Jul 2024 03:00  Phos  4.6     07-30  Mg     2.00     07-30

## 2024-07-31 LAB
ANION GAP SERPL CALC-SCNC: 13 MMOL/L — SIGNIFICANT CHANGE UP (ref 7–14)
APTT BLD: 33.9 SEC — SIGNIFICANT CHANGE UP (ref 24.5–35.6)
APTT BLD: 49.4 SEC — HIGH (ref 24.5–35.6)
APTT BLD: 51.2 SEC — HIGH (ref 24.5–35.6)
BUN SERPL-MCNC: 29 MG/DL — HIGH (ref 7–23)
CALCIUM SERPL-MCNC: 8.6 MG/DL — SIGNIFICANT CHANGE UP (ref 8.4–10.5)
CHLORIDE SERPL-SCNC: 100 MMOL/L — SIGNIFICANT CHANGE UP (ref 98–107)
CO2 SERPL-SCNC: 23 MMOL/L — SIGNIFICANT CHANGE UP (ref 22–31)
CREAT SERPL-MCNC: 1.03 MG/DL — SIGNIFICANT CHANGE UP (ref 0.5–1.3)
EGFR: 58 ML/MIN/1.73M2 — LOW
GLUCOSE SERPL-MCNC: 145 MG/DL — HIGH (ref 70–99)
HCT VFR BLD CALC: 34.1 % — LOW (ref 34.5–45)
HGB BLD-MCNC: 10.9 G/DL — LOW (ref 11.5–15.5)
MCHC RBC-ENTMCNC: 27.3 PG — SIGNIFICANT CHANGE UP (ref 27–34)
MCHC RBC-ENTMCNC: 32 GM/DL — SIGNIFICANT CHANGE UP (ref 32–36)
MCV RBC AUTO: 85.3 FL — SIGNIFICANT CHANGE UP (ref 80–100)
NRBC # BLD: 0 /100 WBCS — SIGNIFICANT CHANGE UP (ref 0–0)
NRBC # FLD: 0 K/UL — SIGNIFICANT CHANGE UP (ref 0–0)
PLATELET # BLD AUTO: 190 K/UL — SIGNIFICANT CHANGE UP (ref 150–400)
POTASSIUM SERPL-MCNC: 4.7 MMOL/L — SIGNIFICANT CHANGE UP (ref 3.5–5.3)
POTASSIUM SERPL-SCNC: 4.7 MMOL/L — SIGNIFICANT CHANGE UP (ref 3.5–5.3)
RBC # BLD: 4 M/UL — SIGNIFICANT CHANGE UP (ref 3.8–5.2)
RBC # FLD: 14.9 % — HIGH (ref 10.3–14.5)
SODIUM SERPL-SCNC: 136 MMOL/L — SIGNIFICANT CHANGE UP (ref 135–145)
WBC # BLD: 15.46 K/UL — HIGH (ref 3.8–10.5)
WBC # FLD AUTO: 15.46 K/UL — HIGH (ref 3.8–10.5)

## 2024-07-31 PROCEDURE — 71045 X-RAY EXAM CHEST 1 VIEW: CPT | Mod: 26

## 2024-07-31 RX ADMIN — HEPARIN SODIUM 10 UNIT(S)/HR: 1000 INJECTION, SOLUTION INTRAVENOUS; SUBCUTANEOUS at 11:04

## 2024-07-31 RX ADMIN — LEVALBUTEROL HYDROCHLORIDE 0.63 MILLIGRAM(S): 0.31 SOLUTION RESPIRATORY (INHALATION) at 09:58

## 2024-07-31 RX ADMIN — LIDOCAINE 5% 1 PATCH: 5 CREAM TOPICAL at 17:03

## 2024-07-31 RX ADMIN — LEVALBUTEROL HYDROCHLORIDE 0.63 MILLIGRAM(S): 0.31 SOLUTION RESPIRATORY (INHALATION) at 15:41

## 2024-07-31 RX ADMIN — Medication 4 MILLILITER(S): at 21:55

## 2024-07-31 RX ADMIN — Medication 4 MILLILITER(S): at 09:58

## 2024-07-31 RX ADMIN — CHLORHEXIDINE GLUCONATE 1 APPLICATION(S): 500 CLOTH TOPICAL at 11:11

## 2024-07-31 RX ADMIN — HEPARIN SODIUM 10 UNIT(S)/HR: 1000 INJECTION, SOLUTION INTRAVENOUS; SUBCUTANEOUS at 19:17

## 2024-07-31 RX ADMIN — LIDOCAINE 5% 1 PATCH: 5 CREAM TOPICAL at 19:00

## 2024-07-31 RX ADMIN — DORNASE ALFA 2.5 MILLIGRAM(S): 1 SOLUTION RESPIRATORY (INHALATION) at 09:59

## 2024-07-31 RX ADMIN — Medication 650 MILLIGRAM(S): at 15:07

## 2024-07-31 RX ADMIN — SENNOSIDES 2 TABLET(S): 8.6 TABLET ORAL at 21:53

## 2024-07-31 RX ADMIN — LEVALBUTEROL HYDROCHLORIDE 0.63 MILLIGRAM(S): 0.31 SOLUTION RESPIRATORY (INHALATION) at 21:55

## 2024-07-31 RX ADMIN — HEPARIN SODIUM 10 UNIT(S)/HR: 1000 INJECTION, SOLUTION INTRAVENOUS; SUBCUTANEOUS at 07:38

## 2024-07-31 RX ADMIN — Medication 30 MILLIGRAM(S): at 05:34

## 2024-07-31 RX ADMIN — Medication 650 MILLIGRAM(S): at 16:00

## 2024-07-31 RX ADMIN — Medication 1 TABLET(S): at 17:03

## 2024-07-31 RX ADMIN — Medication 1 TABLET(S): at 05:34

## 2024-07-31 NOTE — PROGRESS NOTE ADULT - SUBJECTIVE AND OBJECTIVE BOX
Syed Mckay MD  Interventional Cardiology / Advance Heart Failure and Cardiac Transplant Specialist  [place] Office : [address] [place] [address] [place] [address]  Tel:   [place] Office : [address] [facility] [place] [address]  Tel: [phone]       Pt is lying in bed comfortable not in distress, no chest pains no SOB no palpitations  	  MEDICATIONS:  diltiazem    Tablet 30 milliGRAM(s) Oral daily  heparin  Infusion 1000 Unit(s)/Hr IV Continuous <Continuous>    amoxicillin  875 milliGRAM(s)/clavulanate 1 Tablet(s) Oral two times a day    dornase milena Solution 2.5 milliGRAM(s) Inhalation every 12 hours  levalbuterol Inhalation 0.63 milliGRAM(s) Inhalation every 6 hours  sodium chloride 3%  Inhalation 4 milliLiter(s) Inhalation every 12 hours    acetaminophen     Tablet .. 650 milliGRAM(s) Oral every 6 hours PRN  HYDROmorphone  Injectable 0.2 milliGRAM(s) IV Push every 3 hours PRN  ondansetron Injectable 4 milliGRAM(s) IV Push every 6 hours PRN  oxyCODONE    IR 5 milliGRAM(s) Oral every 3 hours PRN  oxyCODONE    IR 2.5 milliGRAM(s) Oral every 3 hours PRN    polyethylene glycol 3350 17 Gram(s) Oral daily  senna 2 Tablet(s) Oral at bedtime      bleomycin PF IntraPleural (Non - oncologic) 60 Unit(s) IntraPleural. once  chlorhexidine 2% Cloths 1 Application(s) Topical daily  lidocaine   4% Patch 1 Patch Transdermal <User Schedule>      PAST MEDICAL/SURGICAL HISTORY  PAST MEDICAL & SURGICAL HISTORY:  Single kidney      History of orthostatic hypotension      Ventricular septal defect      Atrial tachycardia      Pulmonary embolism      Supraventricular tachycardia      Nonobstructive atherosclerosis of coronary artery      DVT, lower extremity      History of palpitations      Mass of right lung      S/p nephrectomy  donated kidney- left 1984          SOCIAL HISTORY: Substance Use (street drugs): ( x ) never used  (  ) other:    FAMILY HISTORY:  Family history of prostate cancer (Father)    FH: diabetes mellitus (Mother)       PHYSICAL EXAM:  T(C): 36.4 (07-31-24 @ 20:11), Max: 37.4 (07-30-24 @ 23:11)  HR: 88 (07-31-24 @ 20:11) (83 - 101)  BP: 128/54 (07-31-24 @ 20:11) (107/51 - 140/79)  RR: 18 (07-31-24 @ 20:11) (18 - 22)  SpO2: 93% (07-31-24 @ 20:11) (91% - 98%)  Wt(kg): --  I&O's Summary    30 Jul 2024 07:01  -  31 Jul 2024 07:00  --------------------------------------------------------  IN: 770 mL / OUT: 1220 mL / NET: -450 mL    31 Jul 2024 07:01  -  31 Jul 2024 22:14  --------------------------------------------------------  IN: 1140 mL / OUT: 750 mL / NET: 390 mL          GENERAL: NAD  EYES:   PERRLA   ENMT:   Moist mucous membranes, Good dentition, No lesions  Cardiovascular: Normal S1 S2, No JVD, No murmurs, No edema  Respiratory: b/l rhonchi   Gastrointestinal:  Soft, Non-tender, + BS	  Extremities: no edema                                    10.9   15.46 )-----------( 190      ( 31 Jul 2024 05:04 )             34.1     07-31    136  |  100  |  29<H>  ----------------------------<  145<H>  4.7   |  23  |  1.03    Ca    8.6      31 Jul 2024 05:04  Phos  4.6     07-30  Mg     2.00     07-30      proBNP:   Lipid Profile:   HgA1c:   TSH:     Consultant(s) Notes Reviewed:  [x ] YES  [ ] NO    Care Discussed with Consultants/Other Providers [ x] YES  [ ] NO    Imaging Personally Reviewed independently:  [x] YES  [ ] NO    All labs, radiologic studies, vitals, orders and medications list reviewed. Patient is seen and examined at bedside. Case discussed with medical team.

## 2024-08-01 ENCOUNTER — TRANSCRIPTION ENCOUNTER (OUTPATIENT)
Age: 69
End: 2024-08-01

## 2024-08-01 VITALS
OXYGEN SATURATION: 97 % | SYSTOLIC BLOOD PRESSURE: 126 MMHG | TEMPERATURE: 98 F | HEART RATE: 84 BPM | DIASTOLIC BLOOD PRESSURE: 61 MMHG | RESPIRATION RATE: 18 BRPM

## 2024-08-01 LAB
ANION GAP SERPL CALC-SCNC: 11 MMOL/L — SIGNIFICANT CHANGE UP (ref 7–14)
APTT BLD: 26.6 SEC — SIGNIFICANT CHANGE UP (ref 24.5–35.6)
BUN SERPL-MCNC: 25 MG/DL — HIGH (ref 7–23)
CALCIUM SERPL-MCNC: 8.4 MG/DL — SIGNIFICANT CHANGE UP (ref 8.4–10.5)
CHLORIDE SERPL-SCNC: 102 MMOL/L — SIGNIFICANT CHANGE UP (ref 98–107)
CO2 SERPL-SCNC: 21 MMOL/L — LOW (ref 22–31)
CREAT SERPL-MCNC: 0.93 MG/DL — SIGNIFICANT CHANGE UP (ref 0.5–1.3)
EGFR: 66 ML/MIN/1.73M2 — SIGNIFICANT CHANGE UP
GLUCOSE SERPL-MCNC: 133 MG/DL — HIGH (ref 70–99)
HCT VFR BLD CALC: 30.2 % — LOW (ref 34.5–45)
HGB BLD-MCNC: 9.9 G/DL — LOW (ref 11.5–15.5)
MCHC RBC-ENTMCNC: 28 PG — SIGNIFICANT CHANGE UP (ref 27–34)
MCHC RBC-ENTMCNC: 32.8 GM/DL — SIGNIFICANT CHANGE UP (ref 32–36)
MCV RBC AUTO: 85.6 FL — SIGNIFICANT CHANGE UP (ref 80–100)
NRBC # BLD: 0 /100 WBCS — SIGNIFICANT CHANGE UP (ref 0–0)
NRBC # FLD: 0 K/UL — SIGNIFICANT CHANGE UP (ref 0–0)
PLATELET # BLD AUTO: 171 K/UL — SIGNIFICANT CHANGE UP (ref 150–400)
POTASSIUM SERPL-MCNC: 4.3 MMOL/L — SIGNIFICANT CHANGE UP (ref 3.5–5.3)
POTASSIUM SERPL-SCNC: 4.3 MMOL/L — SIGNIFICANT CHANGE UP (ref 3.5–5.3)
RBC # BLD: 3.53 M/UL — LOW (ref 3.8–5.2)
RBC # FLD: 15.2 % — HIGH (ref 10.3–14.5)
SODIUM SERPL-SCNC: 134 MMOL/L — LOW (ref 135–145)
WBC # BLD: 12.52 K/UL — HIGH (ref 3.8–10.5)
WBC # FLD AUTO: 12.52 K/UL — HIGH (ref 3.8–10.5)

## 2024-08-01 PROCEDURE — 71045 X-RAY EXAM CHEST 1 VIEW: CPT | Mod: 26,77

## 2024-08-01 PROCEDURE — 71045 X-RAY EXAM CHEST 1 VIEW: CPT | Mod: 26

## 2024-08-01 RX ORDER — OXYCODONE HYDROCHLORIDE 30 MG/1
1 TABLET ORAL
Qty: 12 | Refills: 0
Start: 2024-08-01

## 2024-08-01 RX ADMIN — LEVALBUTEROL HYDROCHLORIDE 0.63 MILLIGRAM(S): 0.31 SOLUTION RESPIRATORY (INHALATION) at 03:48

## 2024-08-01 RX ADMIN — LEVALBUTEROL HYDROCHLORIDE 0.63 MILLIGRAM(S): 0.31 SOLUTION RESPIRATORY (INHALATION) at 10:37

## 2024-08-01 RX ADMIN — Medication 30 MILLIGRAM(S): at 05:13

## 2024-08-01 RX ADMIN — Medication 1 TABLET(S): at 05:14

## 2024-08-01 RX ADMIN — Medication 4 MILLILITER(S): at 10:36

## 2024-08-01 RX ADMIN — LIDOCAINE 5% 1 PATCH: 5 CREAM TOPICAL at 05:09

## 2024-08-01 RX ADMIN — Medication 17 GRAM(S): at 11:23

## 2024-08-01 NOTE — PROGRESS NOTE ADULT - PROVIDER SPECIALTY LIST ADULT
Critical Care
Cardiology
Critical Care
Pain Medicine
Cardiology
Critical Care
Heme/Onc

## 2024-08-01 NOTE — DISCHARGE NOTE NURSING/CASE MANAGEMENT/SOCIAL WORK - NSDCPEFALRISK_GEN_ALL_CORE
For information on Fall & Injury Prevention, visit: [email or web address] OR  [email or web address] OR  https://www.cdc.gov/steadi/patient.html

## 2024-08-01 NOTE — PROGRESS NOTE ADULT - ASSESSMENT
Assessment and Plan     1) H/o PE with RV strain :' no signs of RHF follows with cards for PHTN screening as pt at risk of CTEPH restarted on heparin     2) Lung mass s.p resection: f/u path and CT surgery recs     3) DVT PPX heparin 
Ms Jasso is a 69 year old female with history of PE with LAC positive on Xarelto here for surgical resection of enlarging right upper lobe pulmonary nodule. Hematology/Oncology consulted for further recommendations.     1. History of PE   - October 2020, LLE DVT with extensive PE with right heart strain   - Positive Lupus AC, B2GP and ACL ab   - outpatient was on Xarelto   - unclear if true APLS however given positive LAC would have patient on therapeutic AC post op  - hep gtt if no contraindication from surgical team to maintain therapeutic PTT  given prior APLS labs positive high risk of thrombosis   - once optimized can place back on home Xarelto dose to follow up with Dr Ram     2. RUL Pulm Nodule   - s/p RUL wedge resection on 7/25/24, path pending   - suspicion for hamartoma   - Appreciate care per thoracic/ICU teams     Will continue to follow.    Mariel Morse PA-C  Hematology/Oncology  [facility]  [phone] (office)  [phone] (alt office)  Evenings and weekends please call MD on call or office  
Assessment and Plan     1) H/o PE with RV strain :' no signs of RHF follows with cards for PHTN screening as pt at risk of CTEPH restarted on heparin     2) Lung mass s.p resection: f/u path and CT surgery recs     3) DVT PPX heparin 
Assessment and Plan     1) H/o PE with RV strain :' no signs of RHF follows with cards for PHTN screening as pt at risk of CTEPH restarted on heparin     2) Lung mass s.p resection: f/u path and CT surgery recs     3) DVT PPX heparin

## 2024-08-01 NOTE — DISCHARGE NOTE NURSING/CASE MANAGEMENT/SOCIAL WORK - PATIENT PORTAL LINK FT
You can access the FollowMyHealth Patient Portal offered by [facility] by registering at the following website: [email or web address]. By joining [employer]s FollowMyHealth portal, you will also be able to view your health information using other applications (apps) compatible with our system.

## 2024-08-01 NOTE — PROGRESS NOTE ADULT - SUBJECTIVE AND OBJECTIVE BOX
Syed Mckay MD  Interventional Cardiology / Advance Heart Failure and Cardiac Transplant Specialist  [place] Office : [phone] [place] [address] [place]. [address]  Tel:   [place] Office : [address] [facility] [place] [address]  Tel: [phone]       Pt is lying in bed comfortable not in distress, no chest pains no SOB no palpitations  	  MEDICATIONS:  diltiazem    Tablet 30 milliGRAM(s) Oral daily    amoxicillin  875 milliGRAM(s)/clavulanate 1 Tablet(s) Oral two times a day    dornase milena Solution 2.5 milliGRAM(s) Inhalation every 12 hours  levalbuterol Inhalation 0.63 milliGRAM(s) Inhalation every 6 hours  sodium chloride 3%  Inhalation 4 milliLiter(s) Inhalation every 12 hours    acetaminophen     Tablet .. 650 milliGRAM(s) Oral every 6 hours PRN  HYDROmorphone  Injectable 0.2 milliGRAM(s) IV Push every 3 hours PRN  ondansetron Injectable 4 milliGRAM(s) IV Push every 6 hours PRN  oxyCODONE    IR 5 milliGRAM(s) Oral every 3 hours PRN  oxyCODONE    IR 2.5 milliGRAM(s) Oral every 3 hours PRN    polyethylene glycol 3350 17 Gram(s) Oral daily  senna 2 Tablet(s) Oral at bedtime      bleomycin PF IntraPleural (Non - oncologic) 60 Unit(s) IntraPleural. once  chlorhexidine 2% Cloths 1 Application(s) Topical daily  lidocaine   4% Patch 1 Patch Transdermal <User Schedule>      PAST MEDICAL/SURGICAL HISTORY  PAST MEDICAL & SURGICAL HISTORY:  Single kidney      History of orthostatic hypotension      Ventricular septal defect      Atrial tachycardia      Pulmonary embolism      Supraventricular tachycardia      Nonobstructive atherosclerosis of coronary artery      DVT, lower extremity      History of palpitations      Mass of right lung      S/p nephrectomy  donated kidney- left 1984          SOCIAL HISTORY: Substance Use (street drugs): ( x ) never used  (  ) other:    FAMILY HISTORY:  Family history of prostate cancer (Father)    FH: diabetes mellitus (Mother)      PHYSICAL EXAM:  T(C): 36.7 (08-01-24 @ 12:17), Max: 37.3 (08-01-24 @ 09:07)  HR: 84 (08-01-24 @ 12:17) (79 - 90)  BP: 126/61 (08-01-24 @ 12:17) (119/57 - 135/63)  RR: 18 (08-01-24 @ 12:17) (18 - 18)  SpO2: 97% (08-01-24 @ 12:17) (92% - 98%)  Wt(kg): --  I&O's Summary    31 Jul 2024 07:01  -  01 Aug 2024 07:00  --------------------------------------------------------  IN: 1668 mL / OUT: 1270 mL / NET: 398 mL    01 Aug 2024 07:01  -  01 Aug 2024 16:02  --------------------------------------------------------  IN: 480 mL / OUT: 300 mL / NET: 180 mL          GENERAL: NAD  EYES:   PERRLA   ENMT:   Moist mucous membranes, Good dentition, No lesions  Cardiovascular: Normal S1 S2, No JVD, No murmurs, No edema  Respiratory: b/l rhonchi   Gastrointestinal:  Soft, Non-tender, + BS	  Extremities: no edema                                    9.9    12.52 )-----------( 171      ( 01 Aug 2024 05:35 )             30.2     08-01    134<L>  |  102  |  25<H>  ----------------------------<  133<H>  4.3   |  21<L>  |  0.93    Ca    8.4      01 Aug 2024 05:35      proBNP:   Lipid Profile:   HgA1c:   TSH:     Consultant(s) Notes Reviewed:  [x ] YES  [ ] NO    Care Discussed with Consultants/Other Providers [ x] YES  [ ] NO    Imaging Personally Reviewed independently:  [x] YES  [ ] NO    All labs, radiologic studies, vitals, orders and medications list reviewed. Patient is seen and examined at bedside. Case discussed with medical team.

## 2024-08-02 PROBLEM — R91.8 OTHER NONSPECIFIC ABNORMAL FINDING OF LUNG FIELD: Chronic | Status: ACTIVE | Noted: 2024-07-18

## 2024-08-02 PROBLEM — Z87.898 PERSONAL HISTORY OF OTHER SPECIFIED CONDITIONS: Chronic | Status: ACTIVE | Noted: 2024-07-18

## 2024-08-06 LAB — SURGICAL PATHOLOGY STUDY: SIGNIFICANT CHANGE UP

## 2024-08-15 DIAGNOSIS — R05.1 ACUTE COUGH: ICD-10-CM

## 2024-08-15 RX ORDER — BENZONATATE 100 MG/1
100 CAPSULE ORAL 3 TIMES DAILY
Qty: 42 | Refills: 0 | Status: ACTIVE | COMMUNITY
Start: 2024-08-15 | End: 1900-01-01

## 2024-08-19 ENCOUNTER — NON-APPOINTMENT (OUTPATIENT)
Age: 69
End: 2024-08-19

## 2024-08-20 ENCOUNTER — APPOINTMENT (OUTPATIENT)
Dept: THORACIC SURGERY | Facility: CLINIC | Age: 69
End: 2024-08-20
Payer: MEDICARE

## 2024-08-20 VITALS
DIASTOLIC BLOOD PRESSURE: 81 MMHG | HEART RATE: 94 BPM | OXYGEN SATURATION: 97 % | WEIGHT: 183 LBS | BODY MASS INDEX: 28.72 KG/M2 | HEIGHT: 67 IN | SYSTOLIC BLOOD PRESSURE: 138 MMHG

## 2024-08-20 DIAGNOSIS — J90 PLEURAL EFFUSION, NOT ELSEWHERE CLASSIFIED: ICD-10-CM

## 2024-08-20 DIAGNOSIS — R91.1 SOLITARY PULMONARY NODULE: ICD-10-CM

## 2024-08-20 PROCEDURE — 99024 POSTOP FOLLOW-UP VISIT: CPT

## 2024-08-20 NOTE — ASSESSMENT
[FreeTextEntry1] : Ms. EUGENIA ALMONTE, 69 year old female, never smoker, w/ hx of positive tilt table test/orthostatic hypotension, non-obstructive CAD, renal donor s/p right nephrectomy, VSD, and recurring palpitations with attempted AT ablation (not performed- 2015) and now s/p PVI, PWI, and CTI ablation on 1/21/202, PE/DVT on Xarelto who referred by Dr. Rafaela Evans (PCP).  CT angio chest on 06/08/2024: ([facility]) - A noncalcified right upper lobe lung nodule with lobulated margins now measures 9 x 8 x 8 mm, previously 7 x 6 x 6 mm (2021). - Dilated proximal aorta: Sinus of Valsalva 4.2 cm and proximal ascending thoracic aorta 4.2 cm, not significantly changed. - Enlarged main pulmonary artery suggesting pulmonary artery hypertension. - Stable small hiatal hernia with small distal esophageal diverticulum.  PET/CT on 7/3/24: - Rounded 0.6 cm RIGHT UPPER lobe pulmonary nodule with trace uptake, likely due to attenuation correction artifact (SUV 1.7)  PFTs on 7/12/24: FVC 75%, FEV1 91%, DLCO 68%  Now s/p R.A., Rt VATS, RUL wedge with IR marking, MLND on 7/25/24. Path revealed ulmonary hamartoma. Lung parenchyma with emphysematous changes, pleural fibrosis with osseous metaplasia. All LNs are negative.   CXR on 8/16:  -small Rt pleural effusion   I have reviewed the patient's medical records and diagnostic images at time of this office consultation and have made the following recommendation: 1. Path reviewed with pt, benign. Small Rt effusion on CXR, will repeat in 6 weeks with CXR    I, Dr. KUHN, NGHIA HOLGUIN, personally performed the evaluation and management (E/M) services for this established patient who presents today with (a) new problem(s)/exacerbation of (an) existing condition(s).  That E/M includes conducting the examination, assessing all new/exacerbated conditions, and establishing a new plan of care.  Today, my ACP, Mile Vanessa, [facility] was here to observe my evaluation and management services for this new problem/exacerbated condition to be followed going forward.

## 2024-08-20 NOTE — CONSULT LETTER
[Dear  ___] : Dear  [unfilled], [Consult Letter:] : I had the pleasure of evaluating your patient, [unfilled]. [Please see my note below.] : Please see my note below. [Consult Closing:] : Thank you very much for allowing me to participate in the care of this patient.  If you have any questions, please do not hesitate to contact me. [Sincerely,] : Sincerely, [FreeTextEntry2] : Dr. Rafaela Evans (PCP/Ref) [FreeTextEntry3] : Dia Edmonds Attending Surgeon Division of Thoracic Surgery  [employer] at [facility]

## 2024-08-20 NOTE — CONSULT LETTER
[Dear  ___] : Dear  [unfilled], [Consult Letter:] : I had the pleasure of evaluating your patient, [unfilled]. [Please see my note below.] : Please see my note below. [Consult Closing:] : Thank you very much for allowing me to participate in the care of this patient.  If you have any questions, please do not hesitate to contact me. [Sincerely,] : Sincerely, [FreeTextEntry2] : Dr. Rafaela Evans (PCP/Ref) [FreeTextEntry3] : Dia Edmonds Attending Surgeon Division of Thoracic Surgery  [employer] at [facility]

## 2024-08-20 NOTE — ASSESSMENT
[FreeTextEntry1] : Ms. EUGENIA ALMONTE, 69 year old female, never smoker, w/ hx of positive tilt table test/orthostatic hypotension, non-obstructive CAD, renal donor s/p right nephrectomy, VSD, and recurring palpitations with attempted AT ablation (not performed- 2015) and now s/p PVI, PWI, and CTI ablation on 1/21/202, PE/DVT on Xarelto who referred by Dr. Rafaela Evans (PCP).  CT angio chest on 06/08/2024: ([facility]) - A noncalcified right upper lobe lung nodule with lobulated margins now measures 9 x 8 x 8 mm, previously 7 x 6 x 6 mm (2021). - Dilated proximal aorta: Sinus of Valsalva 4.2 cm and proximal ascending thoracic aorta 4.2 cm, not significantly changed. - Enlarged main pulmonary artery suggesting pulmonary artery hypertension. - Stable small hiatal hernia with small distal esophageal diverticulum.  PET/CT on 7/3/24: - Rounded 0.6 cm RIGHT UPPER lobe pulmonary nodule with trace uptake, likely due to attenuation correction artifact (SUV 1.7)  PFTs on 7/12/24: FVC 75%, FEV1 91%, DLCO 68%  Now s/p R.A., Rt VATS, RUL wedge with IR marking, MLND on 7/25/24. Path revealed ulmonary hamartoma. Lung parenchyma with emphysematous changes, pleural fibrosis with osseous metaplasia. All LNs are negative.   CXR on 8/16:  -small Rt pleural effusion   I have reviewed the patient's medical records and diagnostic images at time of this office consultation and have made the following recommendation: 1. Path reviewed with pt, benign. Small Rt effusion on CXR, will repeat in 6 weeks with CXR    I, Dr. KUHN, NGHIA HOLGUIN, personally performed the evaluation and management (E/M) services for this established patient who presents today with (a) new problem(s)/exacerbation of (an) existing condition(s).  That E/M includes conducting the examination, assessing all new/exacerbated conditions, and establishing a new plan of care.  Today, my ACP, Mile Vanessa, [facility]-BC was here to observe my evaluation and management services for this new problem/exacerbated condition to be followed going forward.

## 2024-09-23 PROBLEM — Q85.9 PULMONARY HAMARTOMA: Status: ACTIVE | Noted: 2024-09-23

## 2024-09-24 ENCOUNTER — APPOINTMENT (OUTPATIENT)
Dept: THORACIC SURGERY | Facility: CLINIC | Age: 69
End: 2024-09-24
Payer: MEDICARE

## 2024-09-24 VITALS
HEIGHT: 67 IN | OXYGEN SATURATION: 96 % | HEART RATE: 92 BPM | WEIGHT: 185 LBS | BODY MASS INDEX: 29.03 KG/M2 | DIASTOLIC BLOOD PRESSURE: 84 MMHG | SYSTOLIC BLOOD PRESSURE: 147 MMHG

## 2024-09-24 DIAGNOSIS — Q85.9 PHAKOMATOSIS, UNSPECIFIED: ICD-10-CM

## 2024-09-24 PROCEDURE — 99024 POSTOP FOLLOW-UP VISIT: CPT

## 2024-09-24 NOTE — CONSULT LETTER
[Dear  ___] : Dear  [unfilled], [Consult Letter:] : I had the pleasure of evaluating your patient, [unfilled]. [Please see my note below.] : Please see my note below. [Consult Closing:] : Thank you very much for allowing me to participate in the care of this patient.  If you have any questions, please do not hesitate to contact me. [Sincerely,] : Sincerely, [FreeTextEntry2] : Dr. Rafaela Evans (PCP/Ref) [FreeTextEntry3] : Dia Edmonds Attending Surgeon Division of Thoracic Surgery  [employer] at [facility]

## 2024-09-24 NOTE — CONSULT LETTER
[Dear  ___] : Dear  [unfilled], [Consult Letter:] : I had the pleasure of evaluating your patient, [unfilled]. [Please see my note below.] : Please see my note below. [Consult Closing:] : Thank you very much for allowing me to participate in the care of this patient.  If you have any questions, please do not hesitate to contact me. [Sincerely,] : Sincerely, [FreeTextEntry2] : Dr. Rafaela Evans (PCP/Ref) [FreeTextEntry3] : Dia Edmonds Attending Surgeon Division of Thoracic Surgery  [employer] at [facility]

## 2024-09-24 NOTE — ASSESSMENT
[FreeTextEntry1] : Ms. EUGENIA ALMONTE, 69 year old female, never smoker, w/ hx of positive tilt table test/orthostatic hypotension, non-obstructive CAD, renal donor s/p right nephrectomy, VSD, and recurring palpitations with attempted AT ablation (not performed- 2015) and now s/p PVI, PWI, and CTI ablation on 1/21/202, PE/DVT on Xarelto who referred by Dr. Rafaela Evans (PCP).  CT angio chest on 06/08/2024: ([facility]) - A noncalcified right upper lobe lung nodule with lobulated margins now measures 9 x 8 x 8 mm, previously 7 x 6 x 6 mm (2021). - Dilated proximal aorta: Sinus of Valsalva 4.2 cm and proximal ascending thoracic aorta 4.2 cm, not significantly changed. - Enlarged main pulmonary artery suggesting pulmonary artery hypertension. - Stable small hiatal hernia with small distal esophageal diverticulum.  PET/CT on 7/3/24: - Rounded 0.6 cm RIGHT UPPER lobe pulmonary nodule with trace uptake, likely due to attenuation correction artifact (SUV 1.7)  PFTs on 7/12/24: FVC 75%, FEV1 91%, DLCO 68%  Now 2 mons s/p R.A., Rt VATS, RUL wedge with IR marking, MLND on 7/25/24. Path revealed pulmonary hamartoma. Lung parenchyma with emphysematous changes, pleural fibrosis with osseous metaplasia. All LNs are negative.   CXR on 8/16:  -small Rt pleural effusion   CXR 9/19/24: - no pleural effusion   I have reviewed the patient's medical records and diagnostic images at time of this office consultation and have made the following recommendation: 1. CXR reviewed, no pleural effusion. RTC PRN    I, Dr. KUHN, NGHIA HOLGUIN, personally performed the evaluation and management (E/M) services for this established patient who presents today with (a) new problem(s)/exacerbation of (an) existing condition(s).  That E/M includes conducting the examination, assessing all new/exacerbated conditions, and establishing a new plan of care.  Today, my ACP, Mile Vanessa, [facility]-BC was here to observe my evaluation and management services for this new problem/exacerbated condition to be followed going forward.

## 2024-09-24 NOTE — DISCUSSION/SUMMARY
[Doing Well] : is doing well [Excellent Pain Control] : has excellent pain control [No Sign of Infection] : is showing no signs of infection [0] : 0 [Clinical Recheck] : clinical recheck

## 2024-09-24 NOTE — ASSESSMENT
[FreeTextEntry1] : Ms. EUGENIA ALMONTE, 69 year old female, never smoker, w/ hx of positive tilt table test/orthostatic hypotension, non-obstructive CAD, renal donor s/p right nephrectomy, VSD, and recurring palpitations with attempted AT ablation (not performed- 2015) and now s/p PVI, PWI, and CTI ablation on 1/21/202, PE/DVT on Xarelto who referred by Dr. Rafaela Evans (PCP).  CT angio chest on 06/08/2024: ([facility]) - A noncalcified right upper lobe lung nodule with lobulated margins now measures 9 x 8 x 8 mm, previously 7 x 6 x 6 mm (2021). - Dilated proximal aorta: Sinus of Valsalva 4.2 cm and proximal ascending thoracic aorta 4.2 cm, not significantly changed. - Enlarged main pulmonary artery suggesting pulmonary artery hypertension. - Stable small hiatal hernia with small distal esophageal diverticulum.  PET/CT on 7/3/24: - Rounded 0.6 cm RIGHT UPPER lobe pulmonary nodule with trace uptake, likely due to attenuation correction artifact (SUV 1.7)  PFTs on 7/12/24: FVC 75%, FEV1 91%, DLCO 68%  Now 2 mons s/p R.A., Rt VATS, RUL wedge with IR marking, MLND on 7/25/24. Path revealed pulmonary hamartoma. Lung parenchyma with emphysematous changes, pleural fibrosis with osseous metaplasia. All LNs are negative.   CXR on 8/16:  -small Rt pleural effusion   CXR 9/19/24: - no pleural effusion   I have reviewed the patient's medical records and diagnostic images at time of this office consultation and have made the following recommendation: 1. CXR reviewed, no pleural effusion. RTC PRN    I, Dr. KUHN, NGHIA HOLGUIN, personally performed the evaluation and management (E/M) services for this established patient who presents today with (a) new problem(s)/exacerbation of (an) existing condition(s).  That E/M includes conducting the examination, assessing all new/exacerbated conditions, and establishing a new plan of care.  Today, my ACP, Mile Vanessa, [facility]-BC was here to observe my evaluation and management services for this new problem/exacerbated condition to be followed going forward.

## 2024-09-27 ENCOUNTER — APPOINTMENT (OUTPATIENT)
Dept: PULMONOLOGY | Facility: CLINIC | Age: 69
End: 2024-09-27

## 2024-09-27 VITALS
BODY MASS INDEX: 28.51 KG/M2 | SYSTOLIC BLOOD PRESSURE: 132 MMHG | TEMPERATURE: 97.1 F | WEIGHT: 182 LBS | OXYGEN SATURATION: 97 % | HEART RATE: 90 BPM | DIASTOLIC BLOOD PRESSURE: 72 MMHG

## 2024-09-27 PROCEDURE — 99214 OFFICE O/P EST MOD 30 MIN: CPT

## 2024-09-27 NOTE — DISCUSSION/SUMMARY
[FreeTextEntry1] : 69-year-old female with stable pulmonary exam post VATS resection of hamartoma.Is unclear as to the etiology of the "noise" that the patient is describing, likely related to diaphragmatic contraction.  1 would expect the problem to resolve.  The patient was reassured.  PFTs will be performed in the future.  She is to follow-up with her PMD as before.

## 2024-09-27 NOTE — REVIEW OF SYSTEMS
[Cough] : no cough [Hemoptysis] : no hemoptysis [Sputum] : no sputum [Dyspnea] : no dyspnea [SOB on Exertion] : no sob on exertion [Palpitations] : palpitations [Negative] : Endocrine

## 2024-09-27 NOTE — HISTORY OF PRESENT ILLNESS
[Never] : never [TextBox_4] : 69-year-old female status post right lower lobe VATS resection of hamartoma, presents for follow-up.  The patient states that since surgery last month, she has an occasional "noise" from her chest ("volcano that is erupting").  She denies cough, chest pain, hemoptysis, night sweats or weight loss. [TextBox_29] : Denies snoring, daytime somnolence, apneic episodes, AM headaches

## 2024-10-23 NOTE — DISCHARGE NOTE PROVIDER - NSDCQMERRANDS_GEN_ALL_CORE
No Eat healthy foods you enjoy. Rivaroxaban/Xarelto DOES NOT have a special diet. Limit your alcohol intake.

## 2024-12-06 ENCOUNTER — APPOINTMENT (OUTPATIENT)
Dept: PULMONOLOGY | Facility: CLINIC | Age: 69
End: 2024-12-06
Payer: MEDICARE

## 2024-12-06 VITALS
SYSTOLIC BLOOD PRESSURE: 116 MMHG | TEMPERATURE: 96.8 F | BODY MASS INDEX: 28.51 KG/M2 | WEIGHT: 182 LBS | OXYGEN SATURATION: 98 % | DIASTOLIC BLOOD PRESSURE: 70 MMHG | HEART RATE: 99 BPM

## 2024-12-06 PROCEDURE — 94060 EVALUATION OF WHEEZING: CPT

## 2024-12-06 PROCEDURE — 94727 GAS DIL/WSHOT DETER LNG VOL: CPT

## 2024-12-06 PROCEDURE — 99214 OFFICE O/P EST MOD 30 MIN: CPT | Mod: 25

## 2024-12-06 PROCEDURE — 94729 DIFFUSING CAPACITY: CPT

## 2024-12-06 RX ORDER — ALBUTEROL SULFATE 90 UG/1
108 (90 BASE) INHALANT RESPIRATORY (INHALATION)
Qty: 1 | Refills: 3 | Status: ACTIVE | COMMUNITY
Start: 2024-12-06 | End: 1900-01-01

## 2025-01-09 NOTE — H&P PST ADULT - DOES PATIENT MEET CRITERIA FOR SEPSIS
Name: Tere Cardona    : 1973     [facility]  [address], [address]  [facility] [address]  Dept: [phone]  Dept Fax: [phone]     Chief Complaint   Patient presents with    Knee Pain    Shoulder Pain        There were no vitals taken for this visit.     Allergies   Allergen Reactions    Levofloxacin Nausea And Vomiting and Rash     Other Reaction(s): Not available        Current Outpatient Medications   Medication Sig Dispense Refill    ibuprofen (IBU) 600 MG tablet Take 1 tablet by mouth every 6 hours as needed for Pain 120 tablet 0    cyclobenzaprine (FLEXERIL) 10 MG tablet Take 1 tablet by mouth 3 times daily as needed for Muscle spasms 21 tablet 0    fexofenadine-pseudoephedrine (ALLEGRA-D 12HR)  MG per extended release tablet Take 1 tablet by mouth 2 times daily 20 tablet 0    acetaminophen (TYLENOL) 500 MG tablet Take 2 tablets by mouth 4 times daily as needed for Pain 30 tablet 0    fluticasone (FLONASE) 50 MCG/ACT nasal spray 2 sprays by Each Nostril route daily 16 g 0    albuterol sulfate HFA (VENTOLIN HFA) 108 (90 Base) MCG/ACT inhaler Inhale 2 puffs into the lungs 4 times daily as needed for Wheezing 18 g 0    doxycycline hyclate (VIBRAMYCIN) 100 MG capsule Take 1 capsule by mouth 2 times daily (Patient not taking: Reported on 2024)      prednisoLONE acetate (PRED FORTE) 1 % ophthalmic suspension Placed 2 drops every 4 hours when awake and right eye.  Continue treatment up to 7 days. 5 mL 0    citalopram (CELEXA) 10 MG tablet Take 1 tablet by mouth daily      metoprolol tartrate (LOPRESSOR) 25 MG tablet Take 1 tablet by mouth daily      metFORMIN (GLUCOPHAGE) 500 MG tablet Take 1 tablet by mouth daily      pantoprazole (PROTONIX) 40 MG tablet Take 1 tablet by mouth daily      zolpidem (AMBIEN) 10 MG tablet Take 1 tablet by mouth.       No current facility-administered 
No

## 2025-03-31 NOTE — H&P ADULT - PROBLEM SELECTOR PLAN 4
Encounter only to close patient to [facility] services  
No pitting edema appreciated on exam at this time though patient with RLE calf tenderness and reported edema day prior. She states was pitting  -LE duplex

## 2025-05-13 NOTE — ED ADULT NURSE NOTE - NURSING GU BLADDER
----- Message from SANJUANITA Serrato sent at 5/13/2025  8:38 AM CDT -----  Please notify patient her swab is positive for BV and yeast- was treated for both at her visit.  I would recommend she complete the medications as prescribed.  If symptoms persist after treatment we discussed trial of alternative medication verus suppressive therapy.  She should contact office.    non-distended

## 2025-06-09 NOTE — PATIENT PROFILE ADULT - MEDICATIONS/VISITS
Call your physician or seek medical care immediately if you notice any of the following symptoms of a bleed:   Red, dark, coffee or cola colored urine  Red or tar like stools  Excessive bleeding from gums or nose  Vomiting coffee colored or bright red material  Coughing up red tinged sputum  Severe or unprovoked pain - severe headache or stomach pain  Sudden, spontaneous bruising for no reason  A cut that will not stop bleeding within 10-15 mins     no

## (undated) DEVICE — FORCEP RADIAL JAW 4 W NDL 2.2MM 2.8MM 240CM ORANGE DISP

## (undated) DEVICE — ENDOCATCH GENERAL 10MM (PURPLE)

## (undated) DEVICE — TROCAR COVIDIEN VERSASTEP PLUS 15MM STANDARD

## (undated) DEVICE — ENDOCATCH GENERAL 15MM (PURPLE)

## (undated) DEVICE — D HELP - CLEARVIEW CLEARIFY SYSTEM

## (undated) DEVICE — VALVE ENDOSCOPE DEFENDO SINGLE USE

## (undated) DEVICE — FOLEY TRAY 16FR 5CC LTX UMETER CLOSED

## (undated) DEVICE — DRSG TEGADERM 2.5X3"

## (undated) DEVICE — SENSOR O2 FINGER ADULT 24/CA

## (undated) DEVICE — SOL IRR POUR NS 0.9% 500ML

## (undated) DEVICE — TUBING MEDI-VAC W MAXIGRIP CONNECTORS 1/4"X6'

## (undated) DEVICE — LUBE JELLY FOILPACK 36GM STERILE

## (undated) DEVICE — SUT PDS II 2-0 27" SH

## (undated) DEVICE — WARMING BLANKET UPPER ADULT

## (undated) DEVICE — STAPLER COVIDIEN ENDO GIA STANDARD HANDLE

## (undated) DEVICE — ELCTR BOVIE PENCIL SMOKE EVACUATION

## (undated) DEVICE — XI ARM GRASPER TIP UP FENESTRATED

## (undated) DEVICE — MASK OXYGEN PANORAMIC

## (undated) DEVICE — KIT ENDO PROCEDURE CUST W/VLV

## (undated) DEVICE — GOWN XL

## (undated) DEVICE — CONTAINER FORMALIN 10% 20ML

## (undated) DEVICE — DRSG GAUZE PACKTNER ROLL

## (undated) DEVICE — SUT POLYSORB 4-0 P-12 UNDYED

## (undated) DEVICE — NDL HYPO REGULAR BEVEL 22G X 1.5" (TURQUOISE)

## (undated) DEVICE — XI ARM GRASPER BIPOLAR LONG 8MM

## (undated) DEVICE — CLAMP BX HOT RAD JAW 3

## (undated) DEVICE — TUBING CANNULA SALTER LABS NASAL ADULT 7FT

## (undated) DEVICE — SPECIMEN CONTAINER 100ML

## (undated) DEVICE — TUBING IV SET GRAVITY 3Y 100" MACRO

## (undated) DEVICE — BLADE SURGICAL #10 STAINLESS

## (undated) DEVICE — ELCTR BOVIE TIP BLADE INSULATED 2.75" EDGE

## (undated) DEVICE — BITE BLOCK MOUTHPCW/STRAP

## (undated) DEVICE — TUBING OLYMPUS INSUFFLATION

## (undated) DEVICE — BLADE SURGICAL #15 CARBON

## (undated) DEVICE — SUT POLYSORB 2-0 30" V-20 UNDYED

## (undated) DEVICE — XI ARM FORCEP FENESTRATED BIPOLAR 8MM

## (undated) DEVICE — SUT VICRYL 0 27" UR-6

## (undated) DEVICE — SNARE LOOP POLY DISP 30MM LOOP

## (undated) DEVICE — SUT MONOCRYL 4-0 27" PS-2 UNDYED

## (undated) DEVICE — SYR LUER LOK 50CC

## (undated) DEVICE — LUBRICANT INST ELECTROLUBE Z SOLUTION

## (undated) DEVICE — TUBING SUCTION 20FT

## (undated) DEVICE — SUT SURGIPRO 0 30" GS-22

## (undated) DEVICE — XI DRAPE COLUMN

## (undated) DEVICE — NDL INJ SCLERO INTERJECT 23G

## (undated) DEVICE — XI DRAPE ARM

## (undated) DEVICE — XI ARM PERMANENT CAUTERY SPATULA

## (undated) DEVICE — RETRIEVER ROTH NET PLATINUM-UNIVERSAL

## (undated) DEVICE — XI ARM FORCEP CADIERE 8MM

## (undated) DEVICE — GRASPER LAPA 5MMX35CM

## (undated) DEVICE — DRSG GAUZE 4X4"

## (undated) DEVICE — SUT SILK 0 24" SH DA

## (undated) DEVICE — VENODYNE/SCD SLEEVE CALF MEDIUM

## (undated) DEVICE — SOL INJ NS 0.9% 500ML 1-PORT

## (undated) DEVICE — DRSG CURITY GAUZE SPONGE 4 X 4" 12-PLY

## (undated) DEVICE — CATH ELCTR GLIDE PRB 7FR

## (undated) DEVICE — ADAPTER ENDO CHNL SINGLE USE

## (undated) DEVICE — XI OBTURATOR OPTICAL BLADELESS 8MM

## (undated) DEVICE — TUBING STRYKEFLOW II SUCTION / IRRIGATOR

## (undated) DEVICE — PACK ROBOTIC LIJ

## (undated) DEVICE — XI SEAL UNIVERSIAL 5-12MM

## (undated) DEVICE — WARMING BLANKET LOWER ADULT

## (undated) DEVICE — DRAPE INSTRUMENT POUCH 6.75" X 11"

## (undated) DEVICE — POSITIONER FOAM HEAD CRADLE (PINK)